# Patient Record
Sex: MALE | Race: WHITE | NOT HISPANIC OR LATINO
[De-identification: names, ages, dates, MRNs, and addresses within clinical notes are randomized per-mention and may not be internally consistent; named-entity substitution may affect disease eponyms.]

---

## 2020-06-15 PROBLEM — Z00.00 ENCOUNTER FOR PREVENTIVE HEALTH EXAMINATION: Status: ACTIVE | Noted: 2020-06-15

## 2020-07-07 ENCOUNTER — APPOINTMENT (OUTPATIENT)
Dept: OTOLARYNGOLOGY | Facility: CLINIC | Age: 70
End: 2020-07-07
Payer: MEDICARE

## 2020-07-07 VITALS
HEIGHT: 74 IN | WEIGHT: 258 LBS | DIASTOLIC BLOOD PRESSURE: 80 MMHG | BODY MASS INDEX: 33.11 KG/M2 | HEART RATE: 91 BPM | SYSTOLIC BLOOD PRESSURE: 123 MMHG | RESPIRATION RATE: 16 BRPM

## 2020-07-07 PROCEDURE — 99204 OFFICE O/P NEW MOD 45 MIN: CPT

## 2020-07-07 RX ORDER — OMEPRAZOLE 40 MG/1
CAPSULE, DELAYED RELEASE ORAL
Refills: 0 | Status: ACTIVE | COMMUNITY

## 2020-07-07 RX ORDER — ALLOPURINOL 300 MG/1
300 TABLET ORAL
Refills: 0 | Status: ACTIVE | COMMUNITY

## 2020-07-07 RX ORDER — ROSUVASTATIN CALCIUM 20 MG/1
20 TABLET, FILM COATED ORAL
Refills: 0 | Status: ACTIVE | COMMUNITY

## 2020-07-07 RX ORDER — TRAZODONE HYDROCHLORIDE 300 MG/1
TABLET ORAL
Refills: 0 | Status: ACTIVE | COMMUNITY

## 2020-07-07 RX ORDER — GABAPENTIN 600 MG/1
600 TABLET, COATED ORAL
Refills: 0 | Status: ACTIVE | COMMUNITY

## 2020-07-07 NOTE — CONSULT LETTER
[Consult Letter:] : I had the pleasure of evaluating your patient, [unfilled]. [Dear  ___] : Dear  [unfilled], [Consult Closing:] : Thank you very much for allowing me to participate in the care of this patient.  If you have any questions, please do not hesitate to contact me. [Please see my note below.] : Please see my note below. [Sincerely,] : Sincerely, [FreeTextEntry2] : Juan Blue MD ([place], [place]) [FreeTextEntry3] : Diego Gleason MD

## 2020-07-07 NOTE — PHYSICAL EXAM
[FreeTextEntry1] : no palp neck masses or nodes [Midline] : trachea located in midline position [Normal] : orientation to person, place, and time: normal

## 2020-07-07 NOTE — HISTORY OF PRESENT ILLNESS
[None] : The patient is currently asymptomatic. [de-identified] : Mr. Vaz is a 69 yo male referred by Dr. Blue,(otolaryngologist)  for thyroid nodules. \par 5/4/2020 cervical MRI showing enlarged thyroid\par 5/19/2020 ultrasound showing bilateral thyroid nodules largest measuring 2.4 cm in the right thyroid gland \par Denies dysphagia,dyspnea. Some hoarseness, changes in voice  and some clearing of throat. \par Pt. recently had a knee replacement.\par Denies familial thyroid cancer or thyroid disease. \par \par no swaloow or resp issues.  [Painful Swallowing] : no painful swallowing [Neck Mass] : no neck mass

## 2020-07-23 ENCOUNTER — APPOINTMENT (OUTPATIENT)
Dept: ULTRASOUND IMAGING | Facility: IMAGING CENTER | Age: 70
End: 2020-07-23
Payer: MEDICARE

## 2020-07-23 ENCOUNTER — RESULT REVIEW (OUTPATIENT)
Age: 70
End: 2020-07-23

## 2020-07-23 ENCOUNTER — OUTPATIENT (OUTPATIENT)
Dept: OUTPATIENT SERVICES | Facility: HOSPITAL | Age: 70
LOS: 1 days | End: 2020-07-23
Payer: MEDICARE

## 2020-07-23 DIAGNOSIS — E04.1 NONTOXIC SINGLE THYROID NODULE: ICD-10-CM

## 2020-07-23 PROCEDURE — 88173 CYTOPATH EVAL FNA REPORT: CPT | Mod: 26

## 2020-07-23 PROCEDURE — 10006 FNA BX W/US GDN EA ADDL: CPT

## 2020-07-23 PROCEDURE — 88172 CYTP DX EVAL FNA 1ST EA SITE: CPT

## 2020-07-23 PROCEDURE — 10005 FNA BX W/US GDN 1ST LES: CPT

## 2020-07-23 PROCEDURE — 88173 CYTOPATH EVAL FNA REPORT: CPT

## 2021-01-01 ENCOUNTER — APPOINTMENT (OUTPATIENT)
Dept: OTOLARYNGOLOGY | Facility: CLINIC | Age: 71
End: 2021-01-01
Payer: MEDICARE

## 2021-01-01 ENCOUNTER — NON-APPOINTMENT (OUTPATIENT)
Age: 71
End: 2021-01-01

## 2021-01-01 VITALS
SYSTOLIC BLOOD PRESSURE: 144 MMHG | HEART RATE: 66 BPM | WEIGHT: 259 LBS | BODY MASS INDEX: 33.24 KG/M2 | HEIGHT: 74 IN | DIASTOLIC BLOOD PRESSURE: 84 MMHG

## 2021-01-01 DIAGNOSIS — Z87.891 PERSONAL HISTORY OF NICOTINE DEPENDENCE: ICD-10-CM

## 2021-01-01 DIAGNOSIS — E04.2 NONTOXIC MULTINODULAR GOITER: ICD-10-CM

## 2021-01-01 DIAGNOSIS — Z82.2 FAMILY HISTORY OF DEAFNESS AND HEARING LOSS: ICD-10-CM

## 2021-01-01 DIAGNOSIS — Z82.49 FAMILY HISTORY OF ISCHEMIC HEART DISEASE AND OTHER DISEASES OF THE CIRCULATORY SYSTEM: ICD-10-CM

## 2021-01-01 DIAGNOSIS — E04.1 NONTOXIC SINGLE THYROID NODULE: ICD-10-CM

## 2021-01-01 PROCEDURE — 99213 OFFICE O/P EST LOW 20 MIN: CPT

## 2021-01-01 RX ORDER — MELOXICAM 15 MG/1
TABLET ORAL
Refills: 0 | Status: ACTIVE | COMMUNITY

## 2021-01-01 RX ORDER — OLMESARTAN MEDOXOMIL-HYDROCHLOROTHIAZIDE 12.5; 4 MG/1; MG/1
40-12.5 TABLET, FILM COATED ORAL
Refills: 0 | Status: DISCONTINUED | COMMUNITY
End: 2021-01-01

## 2021-11-30 PROBLEM — Z82.2 FAMILY HISTORY OF DEAFNESS OR HEARING LOSS: Status: ACTIVE | Noted: 2021-01-01

## 2021-11-30 PROBLEM — Z82.49 FAMILY HISTORY OF MYOCARDIAL INFARCTION: Status: ACTIVE | Noted: 2021-01-01

## 2021-11-30 PROBLEM — E04.1 THYROID NODULE: Status: ACTIVE | Noted: 2020-07-07

## 2021-11-30 PROBLEM — Z87.891 HISTORY OF CIGAR SMOKING: Status: ACTIVE | Noted: 2021-01-01

## 2021-11-30 NOTE — HISTORY OF PRESENT ILLNESS
[None] : The patient is currently asymptomatic. [de-identified] : 71 year male referred by Dr. Blue,(otolaryngologist)  for thyroid nodules. \par 5/4/2020 cervical MRI showing enlarged thyroid\par 5/19/2020 ultrasound showing bilateral thyroid nodules largest measuring 2.4 cm in the right thyroid gland \par Denies Patient denies dysphagia, odynophagia, dyspnea or otalgia. Denies recent fever or infections. Takes Omeprazole daily. Occasionally smokes a cigar. Some hoarseness and some clearing of throat, stable since last clinic visit.\par Denies familial thyroid cancer or thyroid disease. \par COVID vaccination (Phizer) 2nd dose completed 04/07/2021 [Neck Mass] : no neck mass [Painful Swallowing] : no painful swallowing

## 2021-11-30 NOTE — PHYSICAL EXAM
[FreeTextEntry1] : no palp neck masses or nodes [Midline] : trachea located in midline position [Normal] : no rashes

## 2021-11-30 NOTE — CONSULT LETTER
[Dear  ___] : Dear  [unfilled], [Consult Letter:] : I had the pleasure of evaluating your patient, [unfilled]. [Please see my note below.] : Please see my note below. [Consult Closing:] : Thank you very much for allowing me to participate in the care of this patient.  If you have any questions, please do not hesitate to contact me. [Sincerely,] : Sincerely, [FreeTextEntry2] : Juan Blue MD ([place], [place]) [FreeTextEntry3] : Diego Gleason MD, FACS\par \par [facility] [place]\par Associate Chair\par Department of Otolaryngology\par Professor\par Otolaryngology & Molecular Medicine\par [employer] [facility] [employer] [facility]

## 2021-12-28 PROBLEM — E04.2 MULTINODULAR THYROID: Status: ACTIVE | Noted: 2021-01-01

## 2022-01-01 ENCOUNTER — INPATIENT (INPATIENT)
Facility: HOSPITAL | Age: 72
LOS: 8 days | Discharge: HOSPICE | DRG: 314 | End: 2022-09-04
Attending: STUDENT IN AN ORGANIZED HEALTH CARE EDUCATION/TRAINING PROGRAM | Admitting: INTERNAL MEDICINE
Payer: MEDICARE

## 2022-01-01 ENCOUNTER — APPOINTMENT (OUTPATIENT)
Dept: PULMONOLOGY | Facility: CLINIC | Age: 72
End: 2022-01-01

## 2022-01-01 ENCOUNTER — TRANSCRIPTION ENCOUNTER (OUTPATIENT)
Age: 72
End: 2022-01-01

## 2022-01-01 VITALS
RESPIRATION RATE: 18 BRPM | HEART RATE: 88 BPM | SYSTOLIC BLOOD PRESSURE: 103 MMHG | TEMPERATURE: 98 F | OXYGEN SATURATION: 99 % | DIASTOLIC BLOOD PRESSURE: 71 MMHG

## 2022-01-01 VITALS
WEIGHT: 240.08 LBS | OXYGEN SATURATION: 95 % | HEIGHT: 72 IN | SYSTOLIC BLOOD PRESSURE: 96 MMHG | HEART RATE: 108 BPM | TEMPERATURE: 98 F | DIASTOLIC BLOOD PRESSURE: 67 MMHG | RESPIRATION RATE: 18 BRPM

## 2022-01-01 DIAGNOSIS — R18.8 OTHER ASCITES: ICD-10-CM

## 2022-01-01 DIAGNOSIS — D69.6 THROMBOCYTOPENIA, UNSPECIFIED: ICD-10-CM

## 2022-01-01 DIAGNOSIS — K59.00 CONSTIPATION, UNSPECIFIED: ICD-10-CM

## 2022-01-01 DIAGNOSIS — I95.9 HYPOTENSION, UNSPECIFIED: ICD-10-CM

## 2022-01-01 DIAGNOSIS — R06.02 SHORTNESS OF BREATH: ICD-10-CM

## 2022-01-01 DIAGNOSIS — C23 MALIGNANT NEOPLASM OF GALLBLADDER: ICD-10-CM

## 2022-01-01 DIAGNOSIS — Z51.5 ENCOUNTER FOR PALLIATIVE CARE: ICD-10-CM

## 2022-01-01 DIAGNOSIS — I82.409 ACUTE EMBOLISM AND THROMBOSIS OF UNSPECIFIED DEEP VEINS OF UNSPECIFIED LOWER EXTREMITY: ICD-10-CM

## 2022-01-01 DIAGNOSIS — E87.1 HYPO-OSMOLALITY AND HYPONATREMIA: ICD-10-CM

## 2022-01-01 DIAGNOSIS — R53.1 WEAKNESS: ICD-10-CM

## 2022-01-01 DIAGNOSIS — J90 PLEURAL EFFUSION, NOT ELSEWHERE CLASSIFIED: ICD-10-CM

## 2022-01-01 DIAGNOSIS — I26.99 OTHER PULMONARY EMBOLISM WITHOUT ACUTE COR PULMONALE: ICD-10-CM

## 2022-01-01 DIAGNOSIS — R09.89 OTHER SPECIFIED SYMPTOMS AND SIGNS INVOLVING THE CIRCULATORY AND RESPIRATORY SYSTEMS: ICD-10-CM

## 2022-01-01 DIAGNOSIS — N17.9 ACUTE KIDNEY FAILURE, UNSPECIFIED: ICD-10-CM

## 2022-01-01 DIAGNOSIS — G89.3 NEOPLASM RELATED PAIN (ACUTE) (CHRONIC): ICD-10-CM

## 2022-01-01 LAB
A1C WITH ESTIMATED AVERAGE GLUCOSE RESULT: 6.9 % — HIGH (ref 4–5.6)
ALBUMIN FLD-MCNC: 1.9 G/DL — SIGNIFICANT CHANGE UP
ALBUMIN FLD-MCNC: 2 G/DL — SIGNIFICANT CHANGE UP
ALBUMIN SERPL ELPH-MCNC: 2.5 G/DL — LOW (ref 3.3–5)
ALBUMIN SERPL ELPH-MCNC: 2.7 G/DL — LOW (ref 3.3–5)
ALBUMIN SERPL ELPH-MCNC: 3.1 G/DL — LOW (ref 3.3–5)
ALBUMIN SERPL ELPH-MCNC: 3.1 G/DL — LOW (ref 3.3–5)
ALBUMIN SERPL ELPH-MCNC: 3.2 G/DL — LOW (ref 3.3–5)
ALBUMIN SERPL ELPH-MCNC: 3.5 G/DL — SIGNIFICANT CHANGE UP (ref 3.3–5)
ALP SERPL-CCNC: 280 U/L — HIGH (ref 40–120)
ALP SERPL-CCNC: 343 U/L — HIGH (ref 40–120)
ALP SERPL-CCNC: 406 U/L — HIGH (ref 40–120)
ALP SERPL-CCNC: 501 U/L — HIGH (ref 40–120)
ALP SERPL-CCNC: 535 U/L — HIGH (ref 40–120)
ALP SERPL-CCNC: 537 U/L — HIGH (ref 40–120)
ALT FLD-CCNC: 25 U/L — SIGNIFICANT CHANGE UP (ref 10–45)
ALT FLD-CCNC: 35 U/L — SIGNIFICANT CHANGE UP (ref 10–45)
ALT FLD-CCNC: 39 U/L — SIGNIFICANT CHANGE UP (ref 10–45)
ALT FLD-CCNC: 46 U/L — HIGH (ref 10–45)
ALT FLD-CCNC: 46 U/L — HIGH (ref 10–45)
ALT FLD-CCNC: 63 U/L — HIGH (ref 10–45)
ANION GAP SERPL CALC-SCNC: 10 MMOL/L — SIGNIFICANT CHANGE UP (ref 5–17)
ANION GAP SERPL CALC-SCNC: 11 MMOL/L — SIGNIFICANT CHANGE UP (ref 5–17)
ANION GAP SERPL CALC-SCNC: 13 MMOL/L — SIGNIFICANT CHANGE UP (ref 5–17)
ANION GAP SERPL CALC-SCNC: 13 MMOL/L — SIGNIFICANT CHANGE UP (ref 5–17)
ANION GAP SERPL CALC-SCNC: 14 MMOL/L — SIGNIFICANT CHANGE UP (ref 5–17)
ANION GAP SERPL CALC-SCNC: 15 MMOL/L — SIGNIFICANT CHANGE UP (ref 5–17)
ANION GAP SERPL CALC-SCNC: 16 MMOL/L — SIGNIFICANT CHANGE UP (ref 5–17)
ANION GAP SERPL CALC-SCNC: 17 MMOL/L — SIGNIFICANT CHANGE UP (ref 5–17)
APPEARANCE UR: ABNORMAL
APPEARANCE UR: ABNORMAL
APTT BLD: 23.5 SEC — LOW (ref 27.5–35.5)
APTT BLD: 31.1 SEC — SIGNIFICANT CHANGE UP (ref 27.5–35.5)
AST SERPL-CCNC: 100 U/L — HIGH (ref 10–40)
AST SERPL-CCNC: 46 U/L — HIGH (ref 10–40)
AST SERPL-CCNC: 48 U/L — HIGH (ref 10–40)
AST SERPL-CCNC: 63 U/L — HIGH (ref 10–40)
AST SERPL-CCNC: 67 U/L — HIGH (ref 10–40)
AST SERPL-CCNC: 67 U/L — HIGH (ref 10–40)
B PERT IGG+IGM PNL SER: ABNORMAL
B PERT IGG+IGM PNL SER: ABNORMAL
BACTERIA # UR AUTO: NEGATIVE — SIGNIFICANT CHANGE UP
BACTERIA # UR AUTO: NEGATIVE — SIGNIFICANT CHANGE UP
BASE EXCESS BLDV CALC-SCNC: 4.9 MMOL/L — HIGH (ref -2–3)
BASE EXCESS BLDV CALC-SCNC: 6.6 MMOL/L — HIGH (ref -2–3)
BASOPHILS # BLD AUTO: 0 K/UL — SIGNIFICANT CHANGE UP (ref 0–0.2)
BASOPHILS NFR BLD AUTO: 0 % — SIGNIFICANT CHANGE UP (ref 0–2)
BILIRUB SERPL-MCNC: 0.5 MG/DL — SIGNIFICANT CHANGE UP (ref 0.2–1.2)
BILIRUB SERPL-MCNC: 0.5 MG/DL — SIGNIFICANT CHANGE UP (ref 0.2–1.2)
BILIRUB SERPL-MCNC: 0.6 MG/DL — SIGNIFICANT CHANGE UP (ref 0.2–1.2)
BILIRUB SERPL-MCNC: 0.7 MG/DL — SIGNIFICANT CHANGE UP (ref 0.2–1.2)
BILIRUB SERPL-MCNC: 0.9 MG/DL — SIGNIFICANT CHANGE UP (ref 0.2–1.2)
BILIRUB SERPL-MCNC: 1 MG/DL — SIGNIFICANT CHANGE UP (ref 0.2–1.2)
BILIRUB UR-MCNC: NEGATIVE — SIGNIFICANT CHANGE UP
BILIRUB UR-MCNC: NEGATIVE — SIGNIFICANT CHANGE UP
BLD GP AB SCN SERPL QL: NEGATIVE — SIGNIFICANT CHANGE UP
BLD GP AB SCN SERPL QL: NEGATIVE — SIGNIFICANT CHANGE UP
BUN SERPL-MCNC: 105 MG/DL — HIGH (ref 7–23)
BUN SERPL-MCNC: 47 MG/DL — HIGH (ref 7–23)
BUN SERPL-MCNC: 52 MG/DL — HIGH (ref 7–23)
BUN SERPL-MCNC: 53 MG/DL — HIGH (ref 7–23)
BUN SERPL-MCNC: 53 MG/DL — HIGH (ref 7–23)
BUN SERPL-MCNC: 56 MG/DL — HIGH (ref 7–23)
BUN SERPL-MCNC: 61 MG/DL — HIGH (ref 7–23)
BUN SERPL-MCNC: 63 MG/DL — HIGH (ref 7–23)
BUN SERPL-MCNC: 91 MG/DL — HIGH (ref 7–23)
BUN SERPL-MCNC: 93 MG/DL — HIGH (ref 7–23)
CA-I SERPL-SCNC: 1.1 MMOL/L — LOW (ref 1.15–1.33)
CA-I SERPL-SCNC: 1.11 MMOL/L — LOW (ref 1.15–1.33)
CALCIUM SERPL-MCNC: 8.2 MG/DL — LOW (ref 8.4–10.5)
CALCIUM SERPL-MCNC: 8.5 MG/DL — SIGNIFICANT CHANGE UP (ref 8.4–10.5)
CALCIUM SERPL-MCNC: 8.8 MG/DL — SIGNIFICANT CHANGE UP (ref 8.4–10.5)
CALCIUM SERPL-MCNC: 8.9 MG/DL — SIGNIFICANT CHANGE UP (ref 8.4–10.5)
CALCIUM SERPL-MCNC: 9.3 MG/DL — SIGNIFICANT CHANGE UP (ref 8.4–10.5)
CALCIUM UR-MCNC: 1 MG/DL — SIGNIFICANT CHANGE UP
CHLORIDE BLDV-SCNC: 86 MMOL/L — LOW (ref 96–108)
CHLORIDE BLDV-SCNC: 89 MMOL/L — LOW (ref 96–108)
CHLORIDE SERPL-SCNC: 86 MMOL/L — LOW (ref 96–108)
CHLORIDE SERPL-SCNC: 88 MMOL/L — LOW (ref 96–108)
CHLORIDE SERPL-SCNC: 88 MMOL/L — LOW (ref 96–108)
CHLORIDE SERPL-SCNC: 90 MMOL/L — LOW (ref 96–108)
CHLORIDE SERPL-SCNC: 90 MMOL/L — LOW (ref 96–108)
CHLORIDE SERPL-SCNC: 92 MMOL/L — LOW (ref 96–108)
CHLORIDE SERPL-SCNC: 93 MMOL/L — LOW (ref 96–108)
CHLORIDE SERPL-SCNC: 95 MMOL/L — LOW (ref 96–108)
CHLORIDE SERPL-SCNC: 96 MMOL/L — SIGNIFICANT CHANGE UP (ref 96–108)
CHLORIDE SERPL-SCNC: 99 MMOL/L — SIGNIFICANT CHANGE UP (ref 96–108)
CHLORIDE UR-SCNC: <20 MMOL/L — SIGNIFICANT CHANGE UP
CHOLEST SERPL-MCNC: 98 MG/DL — SIGNIFICANT CHANGE UP
CO2 BLDV-SCNC: 31 MMOL/L — HIGH (ref 22–26)
CO2 BLDV-SCNC: 33 MMOL/L — HIGH (ref 22–26)
CO2 SERPL-SCNC: 23 MMOL/L — SIGNIFICANT CHANGE UP (ref 22–31)
CO2 SERPL-SCNC: 24 MMOL/L — SIGNIFICANT CHANGE UP (ref 22–31)
CO2 SERPL-SCNC: 25 MMOL/L — SIGNIFICANT CHANGE UP (ref 22–31)
CO2 SERPL-SCNC: 26 MMOL/L — SIGNIFICANT CHANGE UP (ref 22–31)
CO2 SERPL-SCNC: 26 MMOL/L — SIGNIFICANT CHANGE UP (ref 22–31)
CO2 SERPL-SCNC: 27 MMOL/L — SIGNIFICANT CHANGE UP (ref 22–31)
CO2 SERPL-SCNC: 30 MMOL/L — SIGNIFICANT CHANGE UP (ref 22–31)
CO2 SERPL-SCNC: 30 MMOL/L — SIGNIFICANT CHANGE UP (ref 22–31)
COLOR FLD: YELLOW — SIGNIFICANT CHANGE UP
COLOR FLD: YELLOW — SIGNIFICANT CHANGE UP
COLOR SPEC: YELLOW — SIGNIFICANT CHANGE UP
COLOR SPEC: YELLOW — SIGNIFICANT CHANGE UP
CORTIS AM PEAK SERPL-MCNC: 33.2 UG/DL — HIGH (ref 6–18.4)
CREAT ?TM UR-MCNC: 120 MG/DL — SIGNIFICANT CHANGE UP
CREAT ?TM UR-MCNC: 88 MG/DL — SIGNIFICANT CHANGE UP
CREAT SERPL-MCNC: 0.79 MG/DL — SIGNIFICANT CHANGE UP (ref 0.5–1.3)
CREAT SERPL-MCNC: 0.94 MG/DL — SIGNIFICANT CHANGE UP (ref 0.5–1.3)
CREAT SERPL-MCNC: 0.95 MG/DL — SIGNIFICANT CHANGE UP (ref 0.5–1.3)
CREAT SERPL-MCNC: 1.02 MG/DL — SIGNIFICANT CHANGE UP (ref 0.5–1.3)
CREAT SERPL-MCNC: 1.19 MG/DL — SIGNIFICANT CHANGE UP (ref 0.5–1.3)
CREAT SERPL-MCNC: 1.25 MG/DL — SIGNIFICANT CHANGE UP (ref 0.5–1.3)
CREAT SERPL-MCNC: 1.69 MG/DL — HIGH (ref 0.5–1.3)
CREAT SERPL-MCNC: 1.7 MG/DL — HIGH (ref 0.5–1.3)
CREAT SERPL-MCNC: 2.33 MG/DL — HIGH (ref 0.5–1.3)
CREAT SERPL-MCNC: 2.37 MG/DL — HIGH (ref 0.5–1.3)
CULTURE RESULTS: SIGNIFICANT CHANGE UP
DIFF PNL FLD: ABNORMAL
DIFF PNL FLD: ABNORMAL
EGFR: 29 ML/MIN/1.73M2 — LOW
EGFR: 29 ML/MIN/1.73M2 — LOW
EGFR: 43 ML/MIN/1.73M2 — LOW
EGFR: 43 ML/MIN/1.73M2 — LOW
EGFR: 62 ML/MIN/1.73M2 — SIGNIFICANT CHANGE UP
EGFR: 66 ML/MIN/1.73M2 — SIGNIFICANT CHANGE UP
EGFR: 79 ML/MIN/1.73M2 — SIGNIFICANT CHANGE UP
EGFR: 86 ML/MIN/1.73M2 — SIGNIFICANT CHANGE UP
EGFR: 87 ML/MIN/1.73M2 — SIGNIFICANT CHANGE UP
EGFR: 96 ML/MIN/1.73M2 — SIGNIFICANT CHANGE UP
EOSINOPHIL # BLD AUTO: 0 K/UL — SIGNIFICANT CHANGE UP (ref 0–0.5)
EOSINOPHIL NFR BLD AUTO: 0 % — SIGNIFICANT CHANGE UP (ref 0–6)
EPI CELLS # UR: 14 /HPF — HIGH
EPI CELLS # UR: 35 /HPF — HIGH
ESTIMATED AVERAGE GLUCOSE: 151 MG/DL — HIGH (ref 68–114)
FLUID INTAKE SUBSTANCE CLASS: SIGNIFICANT CHANGE UP
FLUID INTAKE SUBSTANCE CLASS: SIGNIFICANT CHANGE UP
GAS PNL BLDV: 120 MMOL/L — CRITICAL LOW (ref 136–145)
GAS PNL BLDV: 122 MMOL/L — LOW (ref 136–145)
GAS PNL BLDV: SIGNIFICANT CHANGE UP
GLUCOSE BLDV-MCNC: 121 MG/DL — HIGH (ref 70–99)
GLUCOSE BLDV-MCNC: 138 MG/DL — HIGH (ref 70–99)
GLUCOSE FLD-MCNC: 86 MG/DL — SIGNIFICANT CHANGE UP
GLUCOSE FLD-MCNC: 99 MG/DL — SIGNIFICANT CHANGE UP
GLUCOSE SERPL-MCNC: 104 MG/DL — HIGH (ref 70–99)
GLUCOSE SERPL-MCNC: 106 MG/DL — HIGH (ref 70–99)
GLUCOSE SERPL-MCNC: 115 MG/DL — HIGH (ref 70–99)
GLUCOSE SERPL-MCNC: 116 MG/DL — HIGH (ref 70–99)
GLUCOSE SERPL-MCNC: 120 MG/DL — HIGH (ref 70–99)
GLUCOSE SERPL-MCNC: 131 MG/DL — HIGH (ref 70–99)
GLUCOSE SERPL-MCNC: 143 MG/DL — HIGH (ref 70–99)
GLUCOSE SERPL-MCNC: 144 MG/DL — HIGH (ref 70–99)
GLUCOSE SERPL-MCNC: 145 MG/DL — HIGH (ref 70–99)
GLUCOSE SERPL-MCNC: 88 MG/DL — SIGNIFICANT CHANGE UP (ref 70–99)
GLUCOSE UR QL: NEGATIVE — SIGNIFICANT CHANGE UP
GLUCOSE UR QL: NEGATIVE — SIGNIFICANT CHANGE UP
GRAM STN FLD: SIGNIFICANT CHANGE UP
GRAM STN FLD: SIGNIFICANT CHANGE UP
HCO3 BLDV-SCNC: 30 MMOL/L — HIGH (ref 22–29)
HCO3 BLDV-SCNC: 31 MMOL/L — HIGH (ref 22–29)
HCT VFR BLD CALC: 37.6 % — LOW (ref 39–50)
HCT VFR BLD CALC: 37.6 % — LOW (ref 39–50)
HCT VFR BLD CALC: 38.6 % — LOW (ref 39–50)
HCT VFR BLD CALC: 39.3 % — SIGNIFICANT CHANGE UP (ref 39–50)
HCT VFR BLD CALC: 42.1 % — SIGNIFICANT CHANGE UP (ref 39–50)
HCT VFR BLD CALC: 42.3 % — SIGNIFICANT CHANGE UP (ref 39–50)
HCT VFR BLD CALC: 43.3 % — SIGNIFICANT CHANGE UP (ref 39–50)
HCT VFR BLD CALC: 46.2 % — SIGNIFICANT CHANGE UP (ref 39–50)
HCT VFR BLDA CALC: 43 % — SIGNIFICANT CHANGE UP (ref 39–51)
HCT VFR BLDA CALC: 50 % — SIGNIFICANT CHANGE UP (ref 39–51)
HCV AB S/CO SERPL IA: 0.06 S/CO — SIGNIFICANT CHANGE UP (ref 0–0.99)
HCV AB SERPL-IMP: SIGNIFICANT CHANGE UP
HDLC SERPL-MCNC: 37 MG/DL — LOW
HGB BLD CALC-MCNC: 14.2 G/DL — SIGNIFICANT CHANGE UP (ref 12.6–17.4)
HGB BLD CALC-MCNC: 16.7 G/DL — SIGNIFICANT CHANGE UP (ref 12.6–17.4)
HGB BLD-MCNC: 11.8 G/DL — LOW (ref 13–17)
HGB BLD-MCNC: 12.2 G/DL — LOW (ref 13–17)
HGB BLD-MCNC: 12.6 G/DL — LOW (ref 13–17)
HGB BLD-MCNC: 12.6 G/DL — LOW (ref 13–17)
HGB BLD-MCNC: 13.1 G/DL — SIGNIFICANT CHANGE UP (ref 13–17)
HGB BLD-MCNC: 13.4 G/DL — SIGNIFICANT CHANGE UP (ref 13–17)
HGB BLD-MCNC: 14.1 G/DL — SIGNIFICANT CHANGE UP (ref 13–17)
HGB BLD-MCNC: 14.8 G/DL — SIGNIFICANT CHANGE UP (ref 13–17)
HYALINE CASTS # UR AUTO: 16 /LPF — HIGH (ref 0–2)
HYALINE CASTS # UR AUTO: 3 /LPF — SIGNIFICANT CHANGE UP (ref 0–7)
INR BLD: 1.2 RATIO — HIGH (ref 0.88–1.16)
INR BLD: 1.31 RATIO — HIGH (ref 0.88–1.16)
KETONES UR-MCNC: NEGATIVE — SIGNIFICANT CHANGE UP
KETONES UR-MCNC: NEGATIVE — SIGNIFICANT CHANGE UP
LACTATE BLDV-MCNC: 2.1 MMOL/L — HIGH (ref 0.5–2)
LACTATE BLDV-MCNC: 2.9 MMOL/L — HIGH (ref 0.5–2)
LDH SERPL L TO P-CCNC: 596 U/L — SIGNIFICANT CHANGE UP
LDH SERPL L TO P-CCNC: 756 U/L — SIGNIFICANT CHANGE UP
LEUKOCYTE ESTERASE UR-ACNC: NEGATIVE — SIGNIFICANT CHANGE UP
LEUKOCYTE ESTERASE UR-ACNC: NEGATIVE — SIGNIFICANT CHANGE UP
LIPID PNL WITH DIRECT LDL SERPL: 34 MG/DL — SIGNIFICANT CHANGE UP
LYMPHOCYTES # BLD AUTO: 0 % — LOW (ref 13–44)
LYMPHOCYTES # BLD AUTO: 0 K/UL — LOW (ref 1–3.3)
LYMPHOCYTES # FLD: 45 % — SIGNIFICANT CHANGE UP
LYMPHOCYTES # FLD: 68 % — SIGNIFICANT CHANGE UP
MAGNESIUM SERPL-MCNC: 2.2 MG/DL — SIGNIFICANT CHANGE UP (ref 1.6–2.6)
MAGNESIUM UR-MCNC: 1.2 MG/DL — SIGNIFICANT CHANGE UP
MANUAL SMEAR VERIFICATION: SIGNIFICANT CHANGE UP
MCHC RBC-ENTMCNC: 30.9 PG — SIGNIFICANT CHANGE UP (ref 27–34)
MCHC RBC-ENTMCNC: 31 GM/DL — LOW (ref 32–36)
MCHC RBC-ENTMCNC: 31 PG — SIGNIFICANT CHANGE UP (ref 27–34)
MCHC RBC-ENTMCNC: 31 PG — SIGNIFICANT CHANGE UP (ref 27–34)
MCHC RBC-ENTMCNC: 31.1 PG — SIGNIFICANT CHANGE UP (ref 27–34)
MCHC RBC-ENTMCNC: 31.3 PG — SIGNIFICANT CHANGE UP (ref 27–34)
MCHC RBC-ENTMCNC: 31.3 PG — SIGNIFICANT CHANGE UP (ref 27–34)
MCHC RBC-ENTMCNC: 31.4 GM/DL — LOW (ref 32–36)
MCHC RBC-ENTMCNC: 31.6 PG — SIGNIFICANT CHANGE UP (ref 27–34)
MCHC RBC-ENTMCNC: 31.7 PG — SIGNIFICANT CHANGE UP (ref 27–34)
MCHC RBC-ENTMCNC: 31.8 GM/DL — LOW (ref 32–36)
MCHC RBC-ENTMCNC: 32 GM/DL — SIGNIFICANT CHANGE UP (ref 32–36)
MCHC RBC-ENTMCNC: 32.1 GM/DL — SIGNIFICANT CHANGE UP (ref 32–36)
MCHC RBC-ENTMCNC: 32.4 GM/DL — SIGNIFICANT CHANGE UP (ref 32–36)
MCHC RBC-ENTMCNC: 32.6 GM/DL — SIGNIFICANT CHANGE UP (ref 32–36)
MCHC RBC-ENTMCNC: 32.6 GM/DL — SIGNIFICANT CHANGE UP (ref 32–36)
MCV RBC AUTO: 100 FL — SIGNIFICANT CHANGE UP (ref 80–100)
MCV RBC AUTO: 96.2 FL — SIGNIFICANT CHANGE UP (ref 80–100)
MCV RBC AUTO: 96.5 FL — SIGNIFICANT CHANGE UP (ref 80–100)
MCV RBC AUTO: 97 FL — SIGNIFICANT CHANGE UP (ref 80–100)
MCV RBC AUTO: 97.4 FL — SIGNIFICANT CHANGE UP (ref 80–100)
MCV RBC AUTO: 97.5 FL — SIGNIFICANT CHANGE UP (ref 80–100)
MCV RBC AUTO: 97.8 FL — SIGNIFICANT CHANGE UP (ref 80–100)
MCV RBC AUTO: 98.9 FL — SIGNIFICANT CHANGE UP (ref 80–100)
MESOTHL CELL # FLD: 1 % — SIGNIFICANT CHANGE UP
MONOCYTES # BLD AUTO: 0 K/UL — SIGNIFICANT CHANGE UP (ref 0–0.9)
MONOCYTES NFR BLD AUTO: 0 % — LOW (ref 2–14)
MONOS+MACROS # FLD: 11 % — SIGNIFICANT CHANGE UP
MONOS+MACROS # FLD: 17 % — SIGNIFICANT CHANGE UP
MRSA PCR RESULT.: SIGNIFICANT CHANGE UP
NEUTROPHILS # BLD AUTO: 9.22 K/UL — HIGH (ref 1.8–7.4)
NEUTROPHILS NFR BLD AUTO: 99.1 % — HIGH (ref 43–77)
NEUTROPHILS-BODY FLUID: 15 % — SIGNIFICANT CHANGE UP
NEUTROPHILS-BODY FLUID: 43 % — SIGNIFICANT CHANGE UP
NEUTS HYPERSEG # BLD: PRESENT — SIGNIFICANT CHANGE UP
NITRITE UR-MCNC: NEGATIVE — SIGNIFICANT CHANGE UP
NITRITE UR-MCNC: NEGATIVE — SIGNIFICANT CHANGE UP
NON HDL CHOLESTEROL: 61 MG/DL — SIGNIFICANT CHANGE UP
NRBC # BLD: 3 /100 — HIGH (ref 0–0)
NRBC # BLD: 5 /100 WBCS — HIGH (ref 0–0)
NRBC # BLD: 5 /100 WBCS — HIGH (ref 0–0)
NRBC # BLD: 6 /100 WBCS — HIGH (ref 0–0)
NRBC # BLD: 7 /100 WBCS — HIGH (ref 0–0)
NRBC # BLD: 8 /100 WBCS — HIGH (ref 0–0)
NT-PROBNP SERPL-SCNC: 1894 PG/ML — HIGH (ref 0–300)
OSMOLALITY SERPL: 283 MOSMOL/KG — SIGNIFICANT CHANGE UP (ref 280–301)
OSMOLALITY UR: 423 MOS/KG — SIGNIFICANT CHANGE UP (ref 300–900)
OSMOLALITY UR: 452 MOS/KG — SIGNIFICANT CHANGE UP (ref 300–900)
PCO2 BLDV: 44 MMHG — SIGNIFICANT CHANGE UP (ref 42–55)
PCO2 BLDV: 44 MMHG — SIGNIFICANT CHANGE UP (ref 42–55)
PH BLDV: 7.44 — HIGH (ref 7.32–7.43)
PH BLDV: 7.46 — HIGH (ref 7.32–7.43)
PH UR: 5.5 — SIGNIFICANT CHANGE UP (ref 5–8)
PH UR: 6 — SIGNIFICANT CHANGE UP (ref 5–8)
PHOSPHATE 24H UR-MCNC: 49.9 MG/DL — SIGNIFICANT CHANGE UP
PHOSPHATE SERPL-MCNC: 2.3 MG/DL — LOW (ref 2.5–4.5)
PLAT MORPH BLD: NORMAL — SIGNIFICANT CHANGE UP
PLATELET # BLD AUTO: 25 K/UL — LOW (ref 150–400)
PLATELET # BLD AUTO: 30 K/UL — LOW (ref 150–400)
PLATELET # BLD AUTO: 33 K/UL — LOW (ref 150–400)
PLATELET # BLD AUTO: 34 K/UL — LOW (ref 150–400)
PLATELET # BLD AUTO: 35 K/UL — LOW (ref 150–400)
PLATELET # BLD AUTO: 41 K/UL — LOW (ref 150–400)
PLATELET # BLD AUTO: 50 K/UL — LOW (ref 150–400)
PLATELET # BLD AUTO: 67 K/UL — LOW (ref 150–400)
PO2 BLDV: 37 MMHG — SIGNIFICANT CHANGE UP (ref 25–45)
PO2 BLDV: 38 MMHG — SIGNIFICANT CHANGE UP (ref 25–45)
POTASSIUM BLDV-SCNC: 4.6 MMOL/L — SIGNIFICANT CHANGE UP (ref 3.5–5.1)
POTASSIUM BLDV-SCNC: 4.7 MMOL/L — SIGNIFICANT CHANGE UP (ref 3.5–5.1)
POTASSIUM SERPL-MCNC: 3.7 MMOL/L — SIGNIFICANT CHANGE UP (ref 3.5–5.3)
POTASSIUM SERPL-MCNC: 4 MMOL/L — SIGNIFICANT CHANGE UP (ref 3.5–5.3)
POTASSIUM SERPL-MCNC: 4 MMOL/L — SIGNIFICANT CHANGE UP (ref 3.5–5.3)
POTASSIUM SERPL-MCNC: 4.2 MMOL/L — SIGNIFICANT CHANGE UP (ref 3.5–5.3)
POTASSIUM SERPL-MCNC: 4.5 MMOL/L — SIGNIFICANT CHANGE UP (ref 3.5–5.3)
POTASSIUM SERPL-MCNC: 4.6 MMOL/L — SIGNIFICANT CHANGE UP (ref 3.5–5.3)
POTASSIUM SERPL-MCNC: 4.9 MMOL/L — SIGNIFICANT CHANGE UP (ref 3.5–5.3)
POTASSIUM SERPL-MCNC: 5.1 MMOL/L — SIGNIFICANT CHANGE UP (ref 3.5–5.3)
POTASSIUM SERPL-MCNC: 5.3 MMOL/L — SIGNIFICANT CHANGE UP (ref 3.5–5.3)
POTASSIUM SERPL-MCNC: 5.3 MMOL/L — SIGNIFICANT CHANGE UP (ref 3.5–5.3)
POTASSIUM SERPL-SCNC: 3.7 MMOL/L — SIGNIFICANT CHANGE UP (ref 3.5–5.3)
POTASSIUM SERPL-SCNC: 4 MMOL/L — SIGNIFICANT CHANGE UP (ref 3.5–5.3)
POTASSIUM SERPL-SCNC: 4 MMOL/L — SIGNIFICANT CHANGE UP (ref 3.5–5.3)
POTASSIUM SERPL-SCNC: 4.2 MMOL/L — SIGNIFICANT CHANGE UP (ref 3.5–5.3)
POTASSIUM SERPL-SCNC: 4.5 MMOL/L — SIGNIFICANT CHANGE UP (ref 3.5–5.3)
POTASSIUM SERPL-SCNC: 4.6 MMOL/L — SIGNIFICANT CHANGE UP (ref 3.5–5.3)
POTASSIUM SERPL-SCNC: 4.9 MMOL/L — SIGNIFICANT CHANGE UP (ref 3.5–5.3)
POTASSIUM SERPL-SCNC: 5.1 MMOL/L — SIGNIFICANT CHANGE UP (ref 3.5–5.3)
POTASSIUM SERPL-SCNC: 5.3 MMOL/L — SIGNIFICANT CHANGE UP (ref 3.5–5.3)
POTASSIUM SERPL-SCNC: 5.3 MMOL/L — SIGNIFICANT CHANGE UP (ref 3.5–5.3)
POTASSIUM UR-SCNC: 58 MMOL/L — SIGNIFICANT CHANGE UP
PROT ?TM UR-MCNC: 95 MG/DL — HIGH (ref 0–12)
PROT FLD-MCNC: 3.4 G/DL — SIGNIFICANT CHANGE UP
PROT FLD-MCNC: 3.6 G/DL — SIGNIFICANT CHANGE UP
PROT SERPL-MCNC: 5.1 G/DL — LOW (ref 6–8.3)
PROT SERPL-MCNC: 5.3 G/DL — LOW (ref 6–8.3)
PROT SERPL-MCNC: 5.6 G/DL — LOW (ref 6–8.3)
PROT SERPL-MCNC: 5.7 G/DL — LOW (ref 6–8.3)
PROT SERPL-MCNC: 5.7 G/DL — LOW (ref 6–8.3)
PROT SERPL-MCNC: 6 G/DL — SIGNIFICANT CHANGE UP (ref 6–8.3)
PROT UR-MCNC: ABNORMAL
PROT UR-MCNC: ABNORMAL
PROTHROM AB SERPL-ACNC: 14 SEC — HIGH (ref 10.5–13.4)
PROTHROM AB SERPL-ACNC: 15.2 SEC — HIGH (ref 10.5–13.4)
RAPID RVP RESULT: SIGNIFICANT CHANGE UP
RBC # BLD: 3.8 M/UL — LOW (ref 4.2–5.8)
RBC # BLD: 3.86 M/UL — LOW (ref 4.2–5.8)
RBC # BLD: 3.98 M/UL — LOW (ref 4.2–5.8)
RBC # BLD: 4.02 M/UL — LOW (ref 4.2–5.8)
RBC # BLD: 4.23 M/UL — SIGNIFICANT CHANGE UP (ref 4.2–5.8)
RBC # BLD: 4.32 M/UL — SIGNIFICANT CHANGE UP (ref 4.2–5.8)
RBC # BLD: 4.5 M/UL — SIGNIFICANT CHANGE UP (ref 4.2–5.8)
RBC # BLD: 4.79 M/UL — SIGNIFICANT CHANGE UP (ref 4.2–5.8)
RBC # FLD: 14.3 % — SIGNIFICANT CHANGE UP (ref 10.3–14.5)
RBC # FLD: 14.6 % — HIGH (ref 10.3–14.5)
RBC # FLD: 14.8 % — HIGH (ref 10.3–14.5)
RBC # FLD: 15.1 % — HIGH (ref 10.3–14.5)
RBC BLD AUTO: SIGNIFICANT CHANGE UP
RBC CASTS # UR COMP ASSIST: 3 /HPF — SIGNIFICANT CHANGE UP (ref 0–4)
RBC CASTS # UR COMP ASSIST: 5 /HPF — HIGH (ref 0–4)
RCV VOL RI: 4000 /UL — HIGH (ref 0–0)
RCV VOL RI: 5000 /UL — HIGH (ref 0–0)
RH IG SCN BLD-IMP: NEGATIVE — SIGNIFICANT CHANGE UP
S AUREUS DNA NOSE QL NAA+PROBE: SIGNIFICANT CHANGE UP
SAO2 % BLDV: 59.9 % — LOW (ref 67–88)
SAO2 % BLDV: 64.8 % — LOW (ref 67–88)
SARS-COV-2 RNA SPEC QL NAA+PROBE: SIGNIFICANT CHANGE UP
SARS-COV-2 RNA SPEC QL NAA+PROBE: SIGNIFICANT CHANGE UP
SODIUM SERPL-SCNC: 127 MMOL/L — LOW (ref 135–145)
SODIUM SERPL-SCNC: 128 MMOL/L — LOW (ref 135–145)
SODIUM SERPL-SCNC: 130 MMOL/L — LOW (ref 135–145)
SODIUM SERPL-SCNC: 133 MMOL/L — LOW (ref 135–145)
SODIUM SERPL-SCNC: 134 MMOL/L — LOW (ref 135–145)
SODIUM SERPL-SCNC: 135 MMOL/L — SIGNIFICANT CHANGE UP (ref 135–145)
SODIUM SERPL-SCNC: 138 MMOL/L — SIGNIFICANT CHANGE UP (ref 135–145)
SODIUM SERPL-SCNC: 140 MMOL/L — SIGNIFICANT CHANGE UP (ref 135–145)
SODIUM UR-SCNC: 8 MMOL/L — SIGNIFICANT CHANGE UP
SODIUM UR-SCNC: 8 MMOL/L — SIGNIFICANT CHANGE UP
SP GR SPEC: 1.04 — HIGH (ref 1.01–1.02)
SP GR SPEC: 1.05 — HIGH (ref 1.01–1.02)
SPECIMEN SOURCE: SIGNIFICANT CHANGE UP
TOTAL NUCLEATED CELL COUNT, BODY FLUID: 219 /UL — SIGNIFICANT CHANGE UP
TOTAL NUCLEATED CELL COUNT, BODY FLUID: 303 /UL — SIGNIFICANT CHANGE UP
TRIGL SERPL-MCNC: 134 MG/DL — SIGNIFICANT CHANGE UP
TROPONIN T, HIGH SENSITIVITY RESULT: 134 NG/L — HIGH (ref 0–51)
TROPONIN T, HIGH SENSITIVITY RESULT: 146 NG/L — HIGH (ref 0–51)
TROPONIN T, HIGH SENSITIVITY RESULT: 152 NG/L — HIGH (ref 0–51)
TROPONIN T, HIGH SENSITIVITY RESULT: 213 NG/L — HIGH (ref 0–51)
TROPONIN T, HIGH SENSITIVITY RESULT: 220 NG/L — HIGH (ref 0–51)
TROPONIN T, HIGH SENSITIVITY RESULT: 233 NG/L — HIGH (ref 0–51)
TSH SERPL-MCNC: 1.8 UIU/ML — SIGNIFICANT CHANGE UP (ref 0.27–4.2)
TUBE TYPE: SIGNIFICANT CHANGE UP
TUBE TYPE: SIGNIFICANT CHANGE UP
UROBILINOGEN FLD QL: ABNORMAL
UROBILINOGEN FLD QL: NEGATIVE — SIGNIFICANT CHANGE UP
VANCOMYCIN TROUGH SERPL-MCNC: 17 UG/ML — SIGNIFICANT CHANGE UP (ref 10–20)
VARIANT LYMPHS # BLD: 0.9 % — SIGNIFICANT CHANGE UP (ref 0–6)
WBC # BLD: 10.27 K/UL — SIGNIFICANT CHANGE UP (ref 3.8–10.5)
WBC # BLD: 10.48 K/UL — SIGNIFICANT CHANGE UP (ref 3.8–10.5)
WBC # BLD: 12.97 K/UL — HIGH (ref 3.8–10.5)
WBC # BLD: 13.19 K/UL — HIGH (ref 3.8–10.5)
WBC # BLD: 7.04 K/UL — SIGNIFICANT CHANGE UP (ref 3.8–10.5)
WBC # BLD: 7.93 K/UL — SIGNIFICANT CHANGE UP (ref 3.8–10.5)
WBC # BLD: 8.74 K/UL — SIGNIFICANT CHANGE UP (ref 3.8–10.5)
WBC # BLD: 9.3 K/UL — SIGNIFICANT CHANGE UP (ref 3.8–10.5)
WBC # FLD AUTO: 10.27 K/UL — SIGNIFICANT CHANGE UP (ref 3.8–10.5)
WBC # FLD AUTO: 10.48 K/UL — SIGNIFICANT CHANGE UP (ref 3.8–10.5)
WBC # FLD AUTO: 12.97 K/UL — HIGH (ref 3.8–10.5)
WBC # FLD AUTO: 13.19 K/UL — HIGH (ref 3.8–10.5)
WBC # FLD AUTO: 7.04 K/UL — SIGNIFICANT CHANGE UP (ref 3.8–10.5)
WBC # FLD AUTO: 7.93 K/UL — SIGNIFICANT CHANGE UP (ref 3.8–10.5)
WBC # FLD AUTO: 8.74 K/UL — SIGNIFICANT CHANGE UP (ref 3.8–10.5)
WBC # FLD AUTO: 9.3 K/UL — SIGNIFICANT CHANGE UP (ref 3.8–10.5)
WBC UR QL: 15 /HPF — HIGH (ref 0–5)
WBC UR QL: 25 /HPF — HIGH (ref 0–5)

## 2022-01-01 PROCEDURE — 86900 BLOOD TYPING SEROLOGIC ABO: CPT

## 2022-01-01 PROCEDURE — 49082 ABD PARACENTESIS: CPT

## 2022-01-01 PROCEDURE — 99231 SBSQ HOSP IP/OBS SF/LOW 25: CPT

## 2022-01-01 PROCEDURE — 76705 ECHO EXAM OF ABDOMEN: CPT | Mod: 26

## 2022-01-01 PROCEDURE — 85014 HEMATOCRIT: CPT

## 2022-01-01 PROCEDURE — 71260 CT THORAX DX C+: CPT | Mod: MA

## 2022-01-01 PROCEDURE — 93970 EXTREMITY STUDY: CPT | Mod: 26

## 2022-01-01 PROCEDURE — 99233 SBSQ HOSP IP/OBS HIGH 50: CPT | Mod: GC

## 2022-01-01 PROCEDURE — 85027 COMPLETE CBC AUTOMATED: CPT

## 2022-01-01 PROCEDURE — 89051 BODY FLUID CELL COUNT: CPT

## 2022-01-01 PROCEDURE — 83935 ASSAY OF URINE OSMOLALITY: CPT

## 2022-01-01 PROCEDURE — U0005: CPT

## 2022-01-01 PROCEDURE — 82533 TOTAL CORTISOL: CPT

## 2022-01-01 PROCEDURE — 87640 STAPH A DNA AMP PROBE: CPT

## 2022-01-01 PROCEDURE — 82570 ASSAY OF URINE CREATININE: CPT

## 2022-01-01 PROCEDURE — 74177 CT ABD & PELVIS W/CONTRAST: CPT | Mod: MA

## 2022-01-01 PROCEDURE — 84484 ASSAY OF TROPONIN QUANT: CPT

## 2022-01-01 PROCEDURE — 87086 URINE CULTURE/COLONY COUNT: CPT

## 2022-01-01 PROCEDURE — 83036 HEMOGLOBIN GLYCOSYLATED A1C: CPT

## 2022-01-01 PROCEDURE — 87102 FUNGUS ISOLATION CULTURE: CPT

## 2022-01-01 PROCEDURE — 80202 ASSAY OF VANCOMYCIN: CPT

## 2022-01-01 PROCEDURE — 99285 EMERGENCY DEPT VISIT HI MDM: CPT | Mod: 25

## 2022-01-01 PROCEDURE — 49083 ABD PARACENTESIS W/IMAGING: CPT

## 2022-01-01 PROCEDURE — 86850 RBC ANTIBODY SCREEN: CPT

## 2022-01-01 PROCEDURE — 84295 ASSAY OF SERUM SODIUM: CPT

## 2022-01-01 PROCEDURE — 99233 SBSQ HOSP IP/OBS HIGH 50: CPT

## 2022-01-01 PROCEDURE — 93010 ELECTROCARDIOGRAM REPORT: CPT

## 2022-01-01 PROCEDURE — 82962 GLUCOSE BLOOD TEST: CPT

## 2022-01-01 PROCEDURE — 93306 TTE W/DOPPLER COMPLETE: CPT

## 2022-01-01 PROCEDURE — 71045 X-RAY EXAM CHEST 1 VIEW: CPT

## 2022-01-01 PROCEDURE — 93970 EXTREMITY STUDY: CPT

## 2022-01-01 PROCEDURE — 86803 HEPATITIS C AB TEST: CPT

## 2022-01-01 PROCEDURE — 99223 1ST HOSP IP/OBS HIGH 75: CPT

## 2022-01-01 PROCEDURE — 85610 PROTHROMBIN TIME: CPT

## 2022-01-01 PROCEDURE — 99232 SBSQ HOSP IP/OBS MODERATE 35: CPT | Mod: GC

## 2022-01-01 PROCEDURE — 93306 TTE W/DOPPLER COMPLETE: CPT | Mod: 26

## 2022-01-01 PROCEDURE — 82435 ASSAY OF BLOOD CHLORIDE: CPT

## 2022-01-01 PROCEDURE — 74176 CT ABD & PELVIS W/O CONTRAST: CPT | Mod: 26

## 2022-01-01 PROCEDURE — 99223 1ST HOSP IP/OBS HIGH 75: CPT | Mod: GC

## 2022-01-01 PROCEDURE — 82947 ASSAY GLUCOSE BLOOD QUANT: CPT

## 2022-01-01 PROCEDURE — 83605 ASSAY OF LACTIC ACID: CPT

## 2022-01-01 PROCEDURE — 87205 SMEAR GRAM STAIN: CPT

## 2022-01-01 PROCEDURE — 84100 ASSAY OF PHOSPHORUS: CPT

## 2022-01-01 PROCEDURE — 96360 HYDRATION IV INFUSION INIT: CPT

## 2022-01-01 PROCEDURE — P9047: CPT

## 2022-01-01 PROCEDURE — 84105 ASSAY OF URINE PHOSPHORUS: CPT

## 2022-01-01 PROCEDURE — 84157 ASSAY OF PROTEIN OTHER: CPT

## 2022-01-01 PROCEDURE — 83735 ASSAY OF MAGNESIUM: CPT

## 2022-01-01 PROCEDURE — 86901 BLOOD TYPING SEROLOGIC RH(D): CPT

## 2022-01-01 PROCEDURE — 82945 GLUCOSE OTHER FLUID: CPT

## 2022-01-01 PROCEDURE — 80048 BASIC METABOLIC PNL TOTAL CA: CPT

## 2022-01-01 PROCEDURE — 76705 ECHO EXAM OF ABDOMEN: CPT

## 2022-01-01 PROCEDURE — 85018 HEMOGLOBIN: CPT

## 2022-01-01 PROCEDURE — 84132 ASSAY OF SERUM POTASSIUM: CPT

## 2022-01-01 PROCEDURE — 87040 BLOOD CULTURE FOR BACTERIA: CPT

## 2022-01-01 PROCEDURE — 87070 CULTURE OTHR SPECIMN AEROBIC: CPT

## 2022-01-01 PROCEDURE — 84300 ASSAY OF URINE SODIUM: CPT

## 2022-01-01 PROCEDURE — 82340 ASSAY OF CALCIUM IN URINE: CPT

## 2022-01-01 PROCEDURE — 71045 X-RAY EXAM CHEST 1 VIEW: CPT | Mod: 26

## 2022-01-01 PROCEDURE — 83615 LACTATE (LD) (LDH) ENZYME: CPT

## 2022-01-01 PROCEDURE — 71260 CT THORAX DX C+: CPT | Mod: 26,MA

## 2022-01-01 PROCEDURE — 82436 ASSAY OF URINE CHLORIDE: CPT

## 2022-01-01 PROCEDURE — 85025 COMPLETE CBC W/AUTO DIFF WBC: CPT

## 2022-01-01 PROCEDURE — 82803 BLOOD GASES ANY COMBINATION: CPT

## 2022-01-01 PROCEDURE — 99497 ADVNCD CARE PLAN 30 MIN: CPT

## 2022-01-01 PROCEDURE — C1880: CPT

## 2022-01-01 PROCEDURE — 83930 ASSAY OF BLOOD OSMOLALITY: CPT

## 2022-01-01 PROCEDURE — 74176 CT ABD & PELVIS W/O CONTRAST: CPT

## 2022-01-01 PROCEDURE — 83880 ASSAY OF NATRIURETIC PEPTIDE: CPT

## 2022-01-01 PROCEDURE — 37191 INS ENDOVAS VENA CAVA FILTR: CPT

## 2022-01-01 PROCEDURE — C1887: CPT

## 2022-01-01 PROCEDURE — 80053 COMPREHEN METABOLIC PANEL: CPT

## 2022-01-01 PROCEDURE — 81001 URINALYSIS AUTO W/SCOPE: CPT

## 2022-01-01 PROCEDURE — 84443 ASSAY THYROID STIM HORMONE: CPT

## 2022-01-01 PROCEDURE — C1769: CPT

## 2022-01-01 PROCEDURE — 0225U NFCT DS DNA&RNA 21 SARSCOV2: CPT

## 2022-01-01 PROCEDURE — 84133 ASSAY OF URINE POTASSIUM: CPT

## 2022-01-01 PROCEDURE — 80061 LIPID PANEL: CPT

## 2022-01-01 PROCEDURE — 36415 COLL VENOUS BLD VENIPUNCTURE: CPT

## 2022-01-01 PROCEDURE — 87641 MR-STAPH DNA AMP PROBE: CPT

## 2022-01-01 PROCEDURE — 84156 ASSAY OF PROTEIN URINE: CPT

## 2022-01-01 PROCEDURE — U0003: CPT

## 2022-01-01 PROCEDURE — 82042 OTHER SOURCE ALBUMIN QUAN EA: CPT

## 2022-01-01 PROCEDURE — 87075 CULTR BACTERIA EXCEPT BLOOD: CPT

## 2022-01-01 PROCEDURE — C1894: CPT

## 2022-01-01 PROCEDURE — C1729: CPT

## 2022-01-01 PROCEDURE — 74177 CT ABD & PELVIS W/CONTRAST: CPT | Mod: 26,MA

## 2022-01-01 PROCEDURE — 85730 THROMBOPLASTIN TIME PARTIAL: CPT

## 2022-01-01 PROCEDURE — 82330 ASSAY OF CALCIUM: CPT

## 2022-01-01 RX ORDER — POLYETHYLENE GLYCOL 3350 17 G/17G
17 POWDER, FOR SOLUTION ORAL DAILY
Refills: 0 | Status: DISCONTINUED | OUTPATIENT
Start: 2022-01-01 | End: 2022-01-01

## 2022-01-01 RX ORDER — TRAZODONE HCL 50 MG
1 TABLET ORAL
Qty: 0 | Refills: 0 | DISCHARGE

## 2022-01-01 RX ORDER — NYSTATIN 500MM UNIT
10 POWDER (EA) MISCELLANEOUS
Qty: 0 | Refills: 0 | DISCHARGE

## 2022-01-01 RX ORDER — KETOROLAC TROMETHAMINE 30 MG/ML
15 SYRINGE (ML) INJECTION ONCE
Refills: 0 | Status: DISCONTINUED | OUTPATIENT
Start: 2022-01-01 | End: 2022-01-01

## 2022-01-01 RX ORDER — MIDODRINE HYDROCHLORIDE 2.5 MG/1
10 TABLET ORAL ONCE
Refills: 0 | Status: COMPLETED | OUTPATIENT
Start: 2022-01-01 | End: 2022-01-01

## 2022-01-01 RX ORDER — VANCOMYCIN HCL 1 G
1000 VIAL (EA) INTRAVENOUS EVERY 12 HOURS
Refills: 0 | Status: DISCONTINUED | OUTPATIENT
Start: 2022-01-01 | End: 2022-01-01

## 2022-01-01 RX ORDER — CLOTRIMAZOLE 10 MG
1 TROCHE MUCOUS MEMBRANE
Qty: 0 | Refills: 0 | DISCHARGE

## 2022-01-01 RX ORDER — VANCOMYCIN HCL 1 G
1250 VIAL (EA) INTRAVENOUS EVERY 24 HOURS
Refills: 0 | Status: DISCONTINUED | OUTPATIENT
Start: 2022-01-01 | End: 2022-01-01

## 2022-01-01 RX ORDER — FUROSEMIDE 40 MG
1 TABLET ORAL
Qty: 0 | Refills: 0 | DISCHARGE

## 2022-01-01 RX ORDER — MORPHINE SULFATE 50 MG/1
1 CAPSULE, EXTENDED RELEASE ORAL
Qty: 30 | Refills: 0
Start: 2022-01-01 | End: 2022-01-01

## 2022-01-01 RX ORDER — SODIUM ZIRCONIUM CYCLOSILICATE 10 G/10G
10 POWDER, FOR SUSPENSION ORAL ONCE
Refills: 0 | Status: COMPLETED | OUTPATIENT
Start: 2022-01-01 | End: 2022-01-01

## 2022-01-01 RX ORDER — MIDODRINE HYDROCHLORIDE 2.5 MG/1
2 TABLET ORAL
Qty: 180 | Refills: 0
Start: 2022-01-01 | End: 2022-10-03

## 2022-01-01 RX ORDER — HYDROMORPHONE HYDROCHLORIDE 2 MG/ML
1 INJECTION INTRAMUSCULAR; INTRAVENOUS; SUBCUTANEOUS
Refills: 0 | Status: DISCONTINUED | OUTPATIENT
Start: 2022-01-01 | End: 2022-01-01

## 2022-01-01 RX ORDER — SODIUM CHLORIDE 9 MG/ML
2 INJECTION INTRAMUSCULAR; INTRAVENOUS; SUBCUTANEOUS
Refills: 0 | Status: DISCONTINUED | OUTPATIENT
Start: 2022-01-01 | End: 2022-01-01

## 2022-01-01 RX ORDER — HYDROMORPHONE HYDROCHLORIDE 2 MG/ML
0.25 INJECTION INTRAMUSCULAR; INTRAVENOUS; SUBCUTANEOUS ONCE
Refills: 0 | Status: DISCONTINUED | OUTPATIENT
Start: 2022-01-01 | End: 2022-01-01

## 2022-01-01 RX ORDER — GABAPENTIN 400 MG/1
200 CAPSULE ORAL
Refills: 0 | Status: DISCONTINUED | OUTPATIENT
Start: 2022-01-01 | End: 2022-01-01

## 2022-01-01 RX ORDER — OXYCODONE HYDROCHLORIDE 5 MG/1
10 TABLET ORAL EVERY 4 HOURS
Refills: 0 | Status: DISCONTINUED | OUTPATIENT
Start: 2022-01-01 | End: 2022-01-01

## 2022-01-01 RX ORDER — OMEPRAZOLE 10 MG/1
1 CAPSULE, DELAYED RELEASE ORAL
Qty: 0 | Refills: 0 | DISCHARGE

## 2022-01-01 RX ORDER — ALBUMIN HUMAN 25 %
50 VIAL (ML) INTRAVENOUS EVERY 6 HOURS
Refills: 0 | Status: DISCONTINUED | OUTPATIENT
Start: 2022-01-01 | End: 2022-01-01

## 2022-01-01 RX ORDER — HYDROMORPHONE HYDROCHLORIDE 2 MG/ML
1 INJECTION INTRAMUSCULAR; INTRAVENOUS; SUBCUTANEOUS EVERY 4 HOURS
Refills: 0 | Status: DISCONTINUED | OUTPATIENT
Start: 2022-01-01 | End: 2022-01-01

## 2022-01-01 RX ORDER — HEPARIN SODIUM 5000 [USP'U]/ML
5000 INJECTION INTRAVENOUS; SUBCUTANEOUS ONCE
Refills: 0 | Status: DISCONTINUED | OUTPATIENT
Start: 2022-01-01 | End: 2022-01-01

## 2022-01-01 RX ORDER — SODIUM CHLORIDE 9 MG/ML
500 INJECTION INTRAMUSCULAR; INTRAVENOUS; SUBCUTANEOUS ONCE
Refills: 0 | Status: COMPLETED | OUTPATIENT
Start: 2022-01-01 | End: 2022-01-01

## 2022-01-01 RX ORDER — CEFTRIAXONE 500 MG/1
2000 INJECTION, POWDER, FOR SOLUTION INTRAMUSCULAR; INTRAVENOUS ONCE
Refills: 0 | Status: COMPLETED | OUTPATIENT
Start: 2022-01-01 | End: 2022-01-01

## 2022-01-01 RX ORDER — ROSUVASTATIN CALCIUM 5 MG/1
1 TABLET ORAL
Qty: 0 | Refills: 0 | DISCHARGE

## 2022-01-01 RX ORDER — UREA 15 G
30 POWDER IN PACKET (EA) ORAL
Refills: 0 | Status: DISCONTINUED | OUTPATIENT
Start: 2022-01-01 | End: 2022-01-01

## 2022-01-01 RX ORDER — MIDODRINE HYDROCHLORIDE 2.5 MG/1
20 TABLET ORAL THREE TIMES A DAY
Refills: 0 | Status: DISCONTINUED | OUTPATIENT
Start: 2022-01-01 | End: 2022-01-01

## 2022-01-01 RX ORDER — ALBUMIN HUMAN 25 %
100 VIAL (ML) INTRAVENOUS EVERY 12 HOURS
Refills: 0 | Status: DISCONTINUED | OUTPATIENT
Start: 2022-01-01 | End: 2022-01-01

## 2022-01-01 RX ORDER — MORPHINE SULFATE 50 MG/1
1 CAPSULE, EXTENDED RELEASE ORAL ONCE
Refills: 0 | Status: DISCONTINUED | OUTPATIENT
Start: 2022-01-01 | End: 2022-01-01

## 2022-01-01 RX ORDER — GABAPENTIN 400 MG/1
2 CAPSULE ORAL
Qty: 0 | Refills: 0 | DISCHARGE

## 2022-01-01 RX ORDER — PANTOPRAZOLE SODIUM 20 MG/1
40 TABLET, DELAYED RELEASE ORAL
Refills: 0 | Status: DISCONTINUED | OUTPATIENT
Start: 2022-01-01 | End: 2022-01-01

## 2022-01-01 RX ORDER — ALBUMIN HUMAN 25 %
50 VIAL (ML) INTRAVENOUS EVERY 12 HOURS
Refills: 0 | Status: DISCONTINUED | OUTPATIENT
Start: 2022-01-01 | End: 2022-01-01

## 2022-01-01 RX ORDER — ONDANSETRON 8 MG/1
4 TABLET, FILM COATED ORAL ONCE
Refills: 0 | Status: COMPLETED | OUTPATIENT
Start: 2022-01-01 | End: 2022-01-01

## 2022-01-01 RX ORDER — CEFTRIAXONE 500 MG/1
2000 INJECTION, POWDER, FOR SOLUTION INTRAMUSCULAR; INTRAVENOUS EVERY 24 HOURS
Refills: 0 | Status: DISCONTINUED | OUTPATIENT
Start: 2022-01-01 | End: 2022-01-01

## 2022-01-01 RX ORDER — ALBUMIN HUMAN 25 %
100 VIAL (ML) INTRAVENOUS EVERY 6 HOURS
Refills: 0 | Status: DISCONTINUED | OUTPATIENT
Start: 2022-01-01 | End: 2022-01-01

## 2022-01-01 RX ORDER — ALLOPURINOL 300 MG
1 TABLET ORAL
Qty: 0 | Refills: 0 | DISCHARGE

## 2022-01-01 RX ORDER — SENNA PLUS 8.6 MG/1
2 TABLET ORAL AT BEDTIME
Refills: 0 | Status: DISCONTINUED | OUTPATIENT
Start: 2022-01-01 | End: 2022-01-01

## 2022-01-01 RX ORDER — MELOXICAM 15 MG/1
1 TABLET ORAL
Qty: 0 | Refills: 0 | DISCHARGE

## 2022-01-01 RX ORDER — LACTULOSE 10 G/15ML
30 SOLUTION ORAL
Qty: 0 | Refills: 0 | DISCHARGE

## 2022-01-01 RX ORDER — SODIUM CHLORIDE 0.65 %
1 AEROSOL, SPRAY (ML) NASAL
Refills: 0 | Status: DISCONTINUED | OUTPATIENT
Start: 2022-01-01 | End: 2022-01-01

## 2022-01-01 RX ORDER — ONDANSETRON 8 MG/1
1 TABLET, FILM COATED ORAL
Qty: 0 | Refills: 0 | DISCHARGE

## 2022-01-01 RX ORDER — MIDODRINE HYDROCHLORIDE 2.5 MG/1
10 TABLET ORAL THREE TIMES A DAY
Refills: 0 | Status: DISCONTINUED | OUTPATIENT
Start: 2022-01-01 | End: 2022-01-01

## 2022-01-01 RX ORDER — VANCOMYCIN HCL 1 G
1000 VIAL (EA) INTRAVENOUS ONCE
Refills: 0 | Status: COMPLETED | OUTPATIENT
Start: 2022-01-01 | End: 2022-01-01

## 2022-01-01 RX ORDER — CHLORHEXIDINE GLUCONATE 213 G/1000ML
1 SOLUTION TOPICAL DAILY
Refills: 0 | Status: DISCONTINUED | OUTPATIENT
Start: 2022-01-01 | End: 2022-01-01

## 2022-01-01 RX ORDER — ALBUMIN HUMAN 25 %
50 VIAL (ML) INTRAVENOUS EVERY 6 HOURS
Refills: 0 | Status: COMPLETED | OUTPATIENT
Start: 2022-01-01 | End: 2022-01-01

## 2022-01-01 RX ORDER — SODIUM CHLORIDE 9 MG/ML
1 INJECTION INTRAMUSCULAR; INTRAVENOUS; SUBCUTANEOUS
Qty: 0 | Refills: 0 | DISCHARGE

## 2022-01-01 RX ADMIN — MIDODRINE HYDROCHLORIDE 10 MILLIGRAM(S): 2.5 TABLET ORAL at 05:31

## 2022-01-01 RX ADMIN — CHLORHEXIDINE GLUCONATE 1 APPLICATION(S): 213 SOLUTION TOPICAL at 12:04

## 2022-01-01 RX ADMIN — CEFTRIAXONE 100 MILLIGRAM(S): 500 INJECTION, POWDER, FOR SOLUTION INTRAMUSCULAR; INTRAVENOUS at 11:45

## 2022-01-01 RX ADMIN — SENNA PLUS 2 TABLET(S): 8.6 TABLET ORAL at 22:01

## 2022-01-01 RX ADMIN — MIDODRINE HYDROCHLORIDE 20 MILLIGRAM(S): 2.5 TABLET ORAL at 17:10

## 2022-01-01 RX ADMIN — MIDODRINE HYDROCHLORIDE 20 MILLIGRAM(S): 2.5 TABLET ORAL at 17:19

## 2022-01-01 RX ADMIN — CHLORHEXIDINE GLUCONATE 1 APPLICATION(S): 213 SOLUTION TOPICAL at 13:38

## 2022-01-01 RX ADMIN — MIDODRINE HYDROCHLORIDE 20 MILLIGRAM(S): 2.5 TABLET ORAL at 06:15

## 2022-01-01 RX ADMIN — MIDODRINE HYDROCHLORIDE 20 MILLIGRAM(S): 2.5 TABLET ORAL at 18:34

## 2022-01-01 RX ADMIN — GABAPENTIN 200 MILLIGRAM(S): 400 CAPSULE ORAL at 17:49

## 2022-01-01 RX ADMIN — PANTOPRAZOLE SODIUM 40 MILLIGRAM(S): 20 TABLET, DELAYED RELEASE ORAL at 06:04

## 2022-01-01 RX ADMIN — GABAPENTIN 200 MILLIGRAM(S): 400 CAPSULE ORAL at 17:10

## 2022-01-01 RX ADMIN — Medication 50 MILLILITER(S): at 12:23

## 2022-01-01 RX ADMIN — CEFTRIAXONE 100 MILLIGRAM(S): 500 INJECTION, POWDER, FOR SOLUTION INTRAMUSCULAR; INTRAVENOUS at 14:29

## 2022-01-01 RX ADMIN — HYDROMORPHONE HYDROCHLORIDE 1 MILLIGRAM(S): 2 INJECTION INTRAMUSCULAR; INTRAVENOUS; SUBCUTANEOUS at 04:57

## 2022-01-01 RX ADMIN — MIDODRINE HYDROCHLORIDE 20 MILLIGRAM(S): 2.5 TABLET ORAL at 06:04

## 2022-01-01 RX ADMIN — CEFTRIAXONE 100 MILLIGRAM(S): 500 INJECTION, POWDER, FOR SOLUTION INTRAMUSCULAR; INTRAVENOUS at 12:28

## 2022-01-01 RX ADMIN — Medication 63.75 MILLIMOLE(S): at 09:51

## 2022-01-01 RX ADMIN — Medication 50 MILLILITER(S): at 23:10

## 2022-01-01 RX ADMIN — Medication 50 MILLILITER(S): at 17:37

## 2022-01-01 RX ADMIN — OXYCODONE HYDROCHLORIDE 10 MILLIGRAM(S): 5 TABLET ORAL at 12:05

## 2022-01-01 RX ADMIN — MIDODRINE HYDROCHLORIDE 20 MILLIGRAM(S): 2.5 TABLET ORAL at 18:16

## 2022-01-01 RX ADMIN — CEFTRIAXONE 100 MILLIGRAM(S): 500 INJECTION, POWDER, FOR SOLUTION INTRAMUSCULAR; INTRAVENOUS at 12:09

## 2022-01-01 RX ADMIN — OXYCODONE HYDROCHLORIDE 10 MILLIGRAM(S): 5 TABLET ORAL at 14:48

## 2022-01-01 RX ADMIN — Medication 50 MILLILITER(S): at 17:51

## 2022-01-01 RX ADMIN — MIDODRINE HYDROCHLORIDE 20 MILLIGRAM(S): 2.5 TABLET ORAL at 11:45

## 2022-01-01 RX ADMIN — POLYETHYLENE GLYCOL 3350 17 GRAM(S): 17 POWDER, FOR SOLUTION ORAL at 17:19

## 2022-01-01 RX ADMIN — SODIUM ZIRCONIUM CYCLOSILICATE 10 GRAM(S): 10 POWDER, FOR SUSPENSION ORAL at 12:29

## 2022-01-01 RX ADMIN — Medication 50 MILLILITER(S): at 17:49

## 2022-01-01 RX ADMIN — Medication 50 MILLILITER(S): at 17:55

## 2022-01-01 RX ADMIN — GABAPENTIN 200 MILLIGRAM(S): 400 CAPSULE ORAL at 06:11

## 2022-01-01 RX ADMIN — SENNA PLUS 2 TABLET(S): 8.6 TABLET ORAL at 22:26

## 2022-01-01 RX ADMIN — ONDANSETRON 4 MILLIGRAM(S): 8 TABLET, FILM COATED ORAL at 04:57

## 2022-01-01 RX ADMIN — CHLORHEXIDINE GLUCONATE 1 APPLICATION(S): 213 SOLUTION TOPICAL at 11:43

## 2022-01-01 RX ADMIN — PANTOPRAZOLE SODIUM 40 MILLIGRAM(S): 20 TABLET, DELAYED RELEASE ORAL at 06:21

## 2022-01-01 RX ADMIN — MIDODRINE HYDROCHLORIDE 20 MILLIGRAM(S): 2.5 TABLET ORAL at 12:47

## 2022-01-01 RX ADMIN — MIDODRINE HYDROCHLORIDE 20 MILLIGRAM(S): 2.5 TABLET ORAL at 12:10

## 2022-01-01 RX ADMIN — Medication 50 MILLILITER(S): at 06:16

## 2022-01-01 RX ADMIN — PANTOPRAZOLE SODIUM 40 MILLIGRAM(S): 20 TABLET, DELAYED RELEASE ORAL at 06:44

## 2022-01-01 RX ADMIN — OXYCODONE HYDROCHLORIDE 10 MILLIGRAM(S): 5 TABLET ORAL at 12:45

## 2022-01-01 RX ADMIN — PANTOPRAZOLE SODIUM 40 MILLIGRAM(S): 20 TABLET, DELAYED RELEASE ORAL at 06:09

## 2022-01-01 RX ADMIN — GABAPENTIN 200 MILLIGRAM(S): 400 CAPSULE ORAL at 06:04

## 2022-01-01 RX ADMIN — MIDODRINE HYDROCHLORIDE 20 MILLIGRAM(S): 2.5 TABLET ORAL at 13:42

## 2022-01-01 RX ADMIN — SENNA PLUS 2 TABLET(S): 8.6 TABLET ORAL at 21:23

## 2022-01-01 RX ADMIN — Medication 15 MILLIGRAM(S): at 15:19

## 2022-01-01 RX ADMIN — Medication 125 MILLIGRAM(S): at 13:09

## 2022-01-01 RX ADMIN — Medication 15 MILLIGRAM(S): at 18:44

## 2022-01-01 RX ADMIN — Medication 50 MILLILITER(S): at 06:21

## 2022-01-01 RX ADMIN — Medication 125 MILLIGRAM(S): at 14:13

## 2022-01-01 RX ADMIN — CHLORHEXIDINE GLUCONATE 1 APPLICATION(S): 213 SOLUTION TOPICAL at 11:40

## 2022-01-01 RX ADMIN — MIDODRINE HYDROCHLORIDE 20 MILLIGRAM(S): 2.5 TABLET ORAL at 12:04

## 2022-01-01 RX ADMIN — Medication 50 MILLILITER(S): at 12:42

## 2022-01-01 RX ADMIN — GABAPENTIN 200 MILLIGRAM(S): 400 CAPSULE ORAL at 06:21

## 2022-01-01 RX ADMIN — MIDODRINE HYDROCHLORIDE 20 MILLIGRAM(S): 2.5 TABLET ORAL at 11:39

## 2022-01-01 RX ADMIN — SENNA PLUS 2 TABLET(S): 8.6 TABLET ORAL at 22:47

## 2022-01-01 RX ADMIN — GABAPENTIN 200 MILLIGRAM(S): 400 CAPSULE ORAL at 17:54

## 2022-01-01 RX ADMIN — MIDODRINE HYDROCHLORIDE 20 MILLIGRAM(S): 2.5 TABLET ORAL at 05:12

## 2022-01-01 RX ADMIN — OXYCODONE HYDROCHLORIDE 10 MILLIGRAM(S): 5 TABLET ORAL at 14:55

## 2022-01-01 RX ADMIN — HYDROMORPHONE HYDROCHLORIDE 0.25 MILLIGRAM(S): 2 INJECTION INTRAMUSCULAR; INTRAVENOUS; SUBCUTANEOUS at 21:10

## 2022-01-01 RX ADMIN — Medication 50 MILLILITER(S): at 15:48

## 2022-01-01 RX ADMIN — PANTOPRAZOLE SODIUM 40 MILLIGRAM(S): 20 TABLET, DELAYED RELEASE ORAL at 05:12

## 2022-01-01 RX ADMIN — SENNA PLUS 2 TABLET(S): 8.6 TABLET ORAL at 23:00

## 2022-01-01 RX ADMIN — Medication 1 APPLICATION(S): at 18:08

## 2022-01-01 RX ADMIN — Medication 50 MILLILITER(S): at 22:52

## 2022-01-01 RX ADMIN — SODIUM CHLORIDE 500 MILLILITER(S): 9 INJECTION INTRAMUSCULAR; INTRAVENOUS; SUBCUTANEOUS at 14:20

## 2022-01-01 RX ADMIN — Medication 250 MILLIGRAM(S): at 03:55

## 2022-01-01 RX ADMIN — MIDODRINE HYDROCHLORIDE 20 MILLIGRAM(S): 2.5 TABLET ORAL at 05:52

## 2022-01-01 RX ADMIN — OXYCODONE HYDROCHLORIDE 10 MILLIGRAM(S): 5 TABLET ORAL at 00:00

## 2022-01-01 RX ADMIN — Medication 30 GRAM(S): at 06:22

## 2022-01-01 RX ADMIN — POLYETHYLENE GLYCOL 3350 17 GRAM(S): 17 POWDER, FOR SOLUTION ORAL at 12:04

## 2022-01-01 RX ADMIN — MIDODRINE HYDROCHLORIDE 20 MILLIGRAM(S): 2.5 TABLET ORAL at 12:33

## 2022-01-01 RX ADMIN — OXYCODONE HYDROCHLORIDE 10 MILLIGRAM(S): 5 TABLET ORAL at 06:09

## 2022-01-01 RX ADMIN — Medication 50 MILLILITER(S): at 05:38

## 2022-01-01 RX ADMIN — MIDODRINE HYDROCHLORIDE 20 MILLIGRAM(S): 2.5 TABLET ORAL at 22:27

## 2022-01-01 RX ADMIN — Medication 250 MILLIGRAM(S): at 17:33

## 2022-01-01 RX ADMIN — Medication 50 MILLILITER(S): at 05:11

## 2022-01-01 RX ADMIN — OXYCODONE HYDROCHLORIDE 10 MILLIGRAM(S): 5 TABLET ORAL at 13:24

## 2022-01-01 RX ADMIN — OXYCODONE HYDROCHLORIDE 10 MILLIGRAM(S): 5 TABLET ORAL at 06:48

## 2022-01-01 RX ADMIN — GABAPENTIN 200 MILLIGRAM(S): 400 CAPSULE ORAL at 17:53

## 2022-01-01 RX ADMIN — OXYCODONE HYDROCHLORIDE 10 MILLIGRAM(S): 5 TABLET ORAL at 23:10

## 2022-01-01 RX ADMIN — GABAPENTIN 200 MILLIGRAM(S): 400 CAPSULE ORAL at 05:31

## 2022-01-01 RX ADMIN — POLYETHYLENE GLYCOL 3350 17 GRAM(S): 17 POWDER, FOR SOLUTION ORAL at 10:11

## 2022-01-01 RX ADMIN — PANTOPRAZOLE SODIUM 40 MILLIGRAM(S): 20 TABLET, DELAYED RELEASE ORAL at 10:07

## 2022-01-01 RX ADMIN — MIDODRINE HYDROCHLORIDE 20 MILLIGRAM(S): 2.5 TABLET ORAL at 06:09

## 2022-01-01 RX ADMIN — Medication 1 APPLICATION(S): at 06:45

## 2022-01-01 RX ADMIN — GABAPENTIN 200 MILLIGRAM(S): 400 CAPSULE ORAL at 17:35

## 2022-01-01 RX ADMIN — MIDODRINE HYDROCHLORIDE 20 MILLIGRAM(S): 2.5 TABLET ORAL at 05:38

## 2022-01-01 RX ADMIN — GABAPENTIN 200 MILLIGRAM(S): 400 CAPSULE ORAL at 17:19

## 2022-01-01 RX ADMIN — CEFTRIAXONE 100 MILLIGRAM(S): 500 INJECTION, POWDER, FOR SOLUTION INTRAMUSCULAR; INTRAVENOUS at 13:39

## 2022-01-01 RX ADMIN — GABAPENTIN 200 MILLIGRAM(S): 400 CAPSULE ORAL at 05:38

## 2022-01-01 RX ADMIN — CHLORHEXIDINE GLUCONATE 1 APPLICATION(S): 213 SOLUTION TOPICAL at 12:44

## 2022-01-01 RX ADMIN — CHLORHEXIDINE GLUCONATE 1 APPLICATION(S): 213 SOLUTION TOPICAL at 12:00

## 2022-01-01 RX ADMIN — Medication 1 APPLICATION(S): at 05:13

## 2022-01-01 RX ADMIN — HYDROMORPHONE HYDROCHLORIDE 1 MILLIGRAM(S): 2 INJECTION INTRAMUSCULAR; INTRAVENOUS; SUBCUTANEOUS at 00:01

## 2022-01-01 RX ADMIN — OXYCODONE HYDROCHLORIDE 10 MILLIGRAM(S): 5 TABLET ORAL at 14:59

## 2022-01-01 RX ADMIN — MORPHINE SULFATE 1 MILLIGRAM(S): 50 CAPSULE, EXTENDED RELEASE ORAL at 18:31

## 2022-01-01 RX ADMIN — GABAPENTIN 200 MILLIGRAM(S): 400 CAPSULE ORAL at 17:33

## 2022-01-01 RX ADMIN — OXYCODONE HYDROCHLORIDE 10 MILLIGRAM(S): 5 TABLET ORAL at 10:07

## 2022-01-01 RX ADMIN — MIDODRINE HYDROCHLORIDE 10 MILLIGRAM(S): 2.5 TABLET ORAL at 23:31

## 2022-01-01 RX ADMIN — MIDODRINE HYDROCHLORIDE 20 MILLIGRAM(S): 2.5 TABLET ORAL at 17:34

## 2022-01-01 RX ADMIN — GABAPENTIN 200 MILLIGRAM(S): 400 CAPSULE ORAL at 18:17

## 2022-01-01 RX ADMIN — GABAPENTIN 200 MILLIGRAM(S): 400 CAPSULE ORAL at 19:02

## 2022-01-01 RX ADMIN — OXYCODONE HYDROCHLORIDE 10 MILLIGRAM(S): 5 TABLET ORAL at 09:51

## 2022-01-01 RX ADMIN — Medication 50 MILLILITER(S): at 01:10

## 2022-01-01 RX ADMIN — SENNA PLUS 2 TABLET(S): 8.6 TABLET ORAL at 21:30

## 2022-01-01 RX ADMIN — Medication 30 GRAM(S): at 17:11

## 2022-01-01 RX ADMIN — MIDODRINE HYDROCHLORIDE 20 MILLIGRAM(S): 2.5 TABLET ORAL at 11:43

## 2022-01-01 RX ADMIN — CHLORHEXIDINE GLUCONATE 1 APPLICATION(S): 213 SOLUTION TOPICAL at 12:09

## 2022-01-01 RX ADMIN — Medication 125 MILLIGRAM(S): at 14:32

## 2022-01-01 RX ADMIN — MIDODRINE HYDROCHLORIDE 20 MILLIGRAM(S): 2.5 TABLET ORAL at 06:21

## 2022-01-01 RX ADMIN — GABAPENTIN 200 MILLIGRAM(S): 400 CAPSULE ORAL at 05:12

## 2022-01-01 RX ADMIN — Medication 166.67 MILLIGRAM(S): at 14:17

## 2022-01-01 RX ADMIN — Medication 250 MILLIGRAM(S): at 06:43

## 2022-01-01 RX ADMIN — HYDROMORPHONE HYDROCHLORIDE 1 MILLIGRAM(S): 2 INJECTION INTRAMUSCULAR; INTRAVENOUS; SUBCUTANEOUS at 01:22

## 2022-01-01 RX ADMIN — Medication 1 APPLICATION(S): at 17:55

## 2022-01-01 RX ADMIN — MIDODRINE HYDROCHLORIDE 20 MILLIGRAM(S): 2.5 TABLET ORAL at 06:44

## 2022-01-01 RX ADMIN — HYDROMORPHONE HYDROCHLORIDE 1 MILLIGRAM(S): 2 INJECTION INTRAMUSCULAR; INTRAVENOUS; SUBCUTANEOUS at 12:33

## 2022-01-01 RX ADMIN — Medication 50 MILLILITER(S): at 06:05

## 2022-01-01 RX ADMIN — PANTOPRAZOLE SODIUM 40 MILLIGRAM(S): 20 TABLET, DELAYED RELEASE ORAL at 05:38

## 2022-01-01 RX ADMIN — GABAPENTIN 200 MILLIGRAM(S): 400 CAPSULE ORAL at 06:09

## 2022-01-01 RX ADMIN — HYDROMORPHONE HYDROCHLORIDE 1 MILLIGRAM(S): 2 INJECTION INTRAMUSCULAR; INTRAVENOUS; SUBCUTANEOUS at 16:50

## 2022-01-01 RX ADMIN — PANTOPRAZOLE SODIUM 40 MILLIGRAM(S): 20 TABLET, DELAYED RELEASE ORAL at 06:34

## 2022-01-01 RX ADMIN — MIDODRINE HYDROCHLORIDE 20 MILLIGRAM(S): 2.5 TABLET ORAL at 12:28

## 2022-01-01 RX ADMIN — SENNA PLUS 2 TABLET(S): 8.6 TABLET ORAL at 23:11

## 2022-01-01 RX ADMIN — HYDROMORPHONE HYDROCHLORIDE 1 MILLIGRAM(S): 2 INJECTION INTRAMUSCULAR; INTRAVENOUS; SUBCUTANEOUS at 12:05

## 2022-01-01 RX ADMIN — Medication 125 MILLIGRAM(S): at 23:11

## 2022-01-01 RX ADMIN — MIDODRINE HYDROCHLORIDE 10 MILLIGRAM(S): 2.5 TABLET ORAL at 19:01

## 2022-01-01 RX ADMIN — CHLORHEXIDINE GLUCONATE 1 APPLICATION(S): 213 SOLUTION TOPICAL at 12:52

## 2022-01-01 RX ADMIN — SODIUM CHLORIDE 500 MILLILITER(S): 9 INJECTION INTRAMUSCULAR; INTRAVENOUS; SUBCUTANEOUS at 13:19

## 2022-01-01 RX ADMIN — HYDROMORPHONE HYDROCHLORIDE 1 MILLIGRAM(S): 2 INJECTION INTRAMUSCULAR; INTRAVENOUS; SUBCUTANEOUS at 06:26

## 2022-01-01 RX ADMIN — MORPHINE SULFATE 1 MILLIGRAM(S): 50 CAPSULE, EXTENDED RELEASE ORAL at 19:55

## 2022-01-01 RX ADMIN — MIDODRINE HYDROCHLORIDE 20 MILLIGRAM(S): 2.5 TABLET ORAL at 17:33

## 2022-01-01 RX ADMIN — GABAPENTIN 200 MILLIGRAM(S): 400 CAPSULE ORAL at 06:44

## 2022-01-01 RX ADMIN — CEFTRIAXONE 100 MILLIGRAM(S): 500 INJECTION, POWDER, FOR SOLUTION INTRAMUSCULAR; INTRAVENOUS at 11:39

## 2022-01-01 RX ADMIN — HYDROMORPHONE HYDROCHLORIDE 1 MILLIGRAM(S): 2 INJECTION INTRAMUSCULAR; INTRAVENOUS; SUBCUTANEOUS at 00:11

## 2022-01-01 RX ADMIN — CEFTRIAXONE 100 MILLIGRAM(S): 500 INJECTION, POWDER, FOR SOLUTION INTRAMUSCULAR; INTRAVENOUS at 15:10

## 2022-01-01 RX ADMIN — GABAPENTIN 200 MILLIGRAM(S): 400 CAPSULE ORAL at 05:52

## 2022-01-01 RX ADMIN — SODIUM CHLORIDE 2 GRAM(S): 9 INJECTION INTRAMUSCULAR; INTRAVENOUS; SUBCUTANEOUS at 19:38

## 2022-01-01 RX ADMIN — Medication 50 MILLILITER(S): at 05:52

## 2022-01-01 RX ADMIN — SODIUM CHLORIDE 2 GRAM(S): 9 INJECTION INTRAMUSCULAR; INTRAVENOUS; SUBCUTANEOUS at 05:31

## 2022-01-01 RX ADMIN — MIDODRINE HYDROCHLORIDE 20 MILLIGRAM(S): 2.5 TABLET ORAL at 17:48

## 2022-01-01 RX ADMIN — CHLORHEXIDINE GLUCONATE 1 APPLICATION(S): 213 SOLUTION TOPICAL at 12:30

## 2022-08-26 NOTE — H&P ADULT - NSHPREVIEWOFSYSTEMS_GEN_ALL_CORE
Vital Signs Last 24 Hrs  T(C): 36.4 (26 Aug 2022 11:58), Max: 36.7 (26 Aug 2022 11:38)  T(F): 97.6 (26 Aug 2022 11:58), Max: 98.1 (26 Aug 2022 11:38)  HR: 99 (26 Aug 2022 15:03) (99 - 108)  BP: 95/62 (26 Aug 2022 15:03) (95/62 - 99/64)  BP(mean): 73 (26 Aug 2022 15:03) (73 - 75)  RR: 20 (26 Aug 2022 15:04) (18 - 22)  SpO2: 95% (26 Aug 2022 15:04) (92% - 96%)    REVIEW OF SYSTEMS:    CONSTITUTIONAL: No weakness, fevers or chills  EYES/ENT: No visual changes;  No vertigo or throat pain   NECK: No pain or stiffness  RESPIRATORY: No cough, wheezing, hemoptysis; No shortness of breath  CARDIOVASCULAR: No chest pain or palpitations  GASTROINTESTINAL: No abdominal or epigastric pain. No nausea, vomiting, or hematemesis; No diarrhea or constipation. No melena or hematochezia.  GENITOURINARY: No dysuria, frequency or hematuria  NEUROLOGICAL: No numbness or weakness  SKIN: No itching, burning, rashes, or lesions   All other review of systems is negative unless indicated above.    Parameters below as of 26 Aug 2022 15:04  Patient On (Oxygen Delivery Method): room air Vital Signs Last 24 Hrs  T(C): 36.4 (26 Aug 2022 11:58), Max: 36.7 (26 Aug 2022 11:38)  T(F): 97.6 (26 Aug 2022 11:58), Max: 98.1 (26 Aug 2022 11:38)  HR: 99 (26 Aug 2022 15:03) (99 - 108)  BP: 95/62 (26 Aug 2022 15:03) (95/62 - 99/64)  BP(mean): 73 (26 Aug 2022 15:03) (73 - 75)  RR: 20 (26 Aug 2022 15:04) (18 - 22)  SpO2: 95% (26 Aug 2022 15:04) (92% - 96%)      REVIEW OF SYSTEMS:    CONSTITUTIONAL: No weakness, fevers or chills  EYES/ENT: No visual changes;  No vertigo or throat pain   NECK: No pain or stiffness  RESPIRATORY: No cough, wheezing, hemoptysis; No shortness of breath  CARDIOVASCULAR: No chest pain or palpitations  GASTROINTESTINAL: No abdominal or epigastric pain. No nausea, vomiting, or hematemesis; No diarrhea or constipation. No melena or hematochezia.  GENITOURINARY: No dysuria, frequency or hematuria  NEUROLOGICAL: No numbness or weakness  SKIN: No itching, burning, rashes, or lesions   All other review of systems is negative unless indicated above.    Parameters below as of 26 Aug 2022 15:04  Patient On (Oxygen Delivery Method): room air

## 2022-08-26 NOTE — CHART NOTE - NSCHARTNOTEFT_GEN_A_CORE
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 Tito Valentin of [facility] s/p Gemcitabin (dose reduced) /Durvalumab LD 8/19/2022 presenting with hypotension 70's/60's,  noted to be hyponatremic (Na 127) and CHA with volume depletion. Patient received a bolus of NS of 500mL with improvement of BP to 80/60s.    Of note patient was recently admitted to [facility] for SOB s/p left thoracentesis and was noted to be hyponatremic on that admission and was DC'd on salt tabs 1g BID, 1L fluid restriction and Lasix 40 mg for LE edema.    Additional records being sent from [facility]. Full consult to follow.    If admitted to medicine please admit to Dr. Bharat Edmondson who is aware of patient.    Thank you.    José Arambula PA-C  Hematology/Oncology  [facility]   [phone] (office)  [phone] (alt office)  Evenings and weekends please call MD on call or office

## 2022-08-26 NOTE — ED ADULT NURSE REASSESSMENT NOTE - NS ED NURSE REASSESS COMMENT FT1
Pt daughter expressed concerns for Pt pain/ pressure/ bruising of buttocks. ED RN suggested to reposition pt and place nikolai cream and wound [id number] patch. Daughter refused and requesting to talk to wound specialist. Pt states he has stabbing  pain in suprapubic area. ED RN states bladder scan will be performed.

## 2022-08-26 NOTE — CONSULT NOTE ADULT - ATTENDING COMMENTS
Events that transpired leading to the patient's current hospitalization was reviewed.  The patient's past medical history/surgical history/family history/social history was gone over.  Agree with physical examination as noted above.    The patient reports that he was in usual state of health until approximate 3 weeks ago during which time he developed worsening abdominal distention, shortness of breath, dyspnea upon exertion, fatigue and lower extremity edema.  She denies any recent travel or changes in his medications.  He continues to receive chemotherapy from the [facility] team (s/p Gemcitabin (dose reduced) /Durvalumab LD 8/19/2022).  He denies any reports of low platelet count.  Denies any blood in his stool or urine.    He was recent admitted to [facility] for shortness of breath status post left thoracentesis.  During which time was noted to be hyponatremic and was discharged on salt tablets 1 g twice daily, fluid restriction and Lasix 40 mg for lower extremity edema.  Earlier today he reports being seen at [facility] for evaluation.    Incidentally he was found to have a left lower lung CT scan and to have marked thrombocytopenia which is likely secondary to chemotherapy.  Discussed with patient what is a pulmonary embolism.  The associated signs and signs of pulmonary embolism reviewed along with the various treatment options.  The patient is at increased risk for bleeding especially if he was to receive anticoagulation therapy.  At this time the patient is felt to be at too high risk to receive anticoagulation therapy.  Would appreciate assistance from oncology team to help to determine when platelet count should rise if it is secondary to chemotherapy and their recommendations concerning anticoagulation therapy.    Would recommend that venous duplex study be performed.    Mild elevation in troponin which is likely secondary to demand ischemia.  Would recommend that a TTE be performed.      The patient has stage IV gallbladder cancer.  To assist in the patient's management it would be beneficial to know the patient's prognosis.  Would recommend goals of care conversation to be had.    All questions concerns of the patient were addressed.    Findings and plan were discussed with hospitalist/Dr. Godoy.
Tahira Juarez MD  Off: [phone]  contact me on teams    (After 5 pm or on weekends please page the on-call fellow/attending, can check [email or web address] for schedule. Login is good william, schedule under [facility] medicine, psych, derm)

## 2022-08-26 NOTE — CONSULT NOTE ADULT - ASSESSMENT
Interventional Radiology    Evaluate for Procedure: PAracentesis     HPI: Patient is a 72 year old Male with PMHx of gallbladder cancer with metastasis to the peritoneum, right pleura, liver, hyponatremia who presents to [facility] from outside laboratory for hypotension, CHA and hyponatremia. Patient admits to abdominal distention, and was recently admitted for a thoracentesis. Pt found to have ascites referred for Paracentesis.     Allergies:   Medications (Abx/Cardiac/Anticoagulation/Blood Products)  cefTRIAXone   IVPB: 100 mL/Hr IV Intermittent (08-26 @ 15:10)    Data:  182.9  111.7  T(C): 36.4  HR: 99  BP: 95/62  RR: 20  SpO2: 95%    -WBC 9.30 / HgB 14.8 / Hct 46.2 / Plt 25  -Na 127 / Cl 88 / BUN 53 / Glucose 143  -K 4.9 / CO2 26 / Cr 1.25  -ALT 63 / Alk Phos 537 / T.Bili 0.7    Radiology:     Assessment/Plan: 72 year old Male with PMHx of gallbladder cancer with metastasis to the peritoneum, right pleura, liver, hyponatremia who presents to [facility] from outside laboratory for hypotension, CHA and hyponatremia. Patient admits to abdominal distention, and was recently admitted for a thoracentesis. Pt found to have ascites referred for Paracentesis.      - Place order under Xiang.  - Plan for paracentesis on 8/29  - Need PLT > 30  - NEED COVID TEST WITHIN  5 DAYS OF PLANNED PROCEDURE.  - Pt  Doesn't need to be NPO.   - Needs STAT CBC, BMP, Coags in AM.  -  D/w NP reyes

## 2022-08-26 NOTE — CONSULT NOTE ADULT - ASSESSMENT
72y male pmh IV gallbladder cancer with mets to the peritomeum, right pleura and liver, Hyponatremia, Pt was referred to ed for c/o low bp, PT c/o feeling tightness in abd. No fever, chills. cough. loc, lightheaded, nvdc, PE Eldery male looking mildy chroncially ill, heent normocephalic atraumatic neck supple chest decreased bs rt chest post,. abd distended. 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 Tito Valentin of [facility] s/p Gemcitabine (dose reduced) /Durvalumab LD 8/19/2022 presenting with hypotension 70's/60's,  noted to be hyponatremic (Na 127) and CHA with volume depletion. Patient received a bolus of NS of 500mL with improvement of BP to 80/60s.    Of note patient was recently admitted to [facility] for SOB s/p left thoracentesis and was noted to be hyponatremic on that admission and was DC'd on salt tabs 1g BID, 1L fluid restriction and Lasix 40 mg for LE edema.    Stage IV Ca Gallbladder  --Under care by Dr. Tito Valentin of [facility]  --S/P Gemcitabine/Durvalumab (LD 8/19/2022)  --Ongoing treatment after discharge    Hyponatremia  --Recently treated at [facility]  --Had been on Na tabs and fluid restriction  --Na 127 on admission  --Treatment per primary team, nephrology    CHA  --Improved with IVF  --Most likely secondary to volume depletion  --Creatinine currently normal at 1.25    Hypotension  --Most likely secondary to volume depletion as well  --Improved with IVF    Thrombocytopenia  --Platelets were reported at 27K at [facility] today 8/26  --Most likely secondary to chemotherapy  --Please check PT/PTT and Fibrinogen to rule out consumption   --Please transfuse platelets if <10K or <50K with bleeding or prior to procedures    We will continue to follow patient and coordinate with [facility]    José Arambula PA-C  Hematology/Oncology  [facility]   [phone] (office)  [phone] (alt office)  Evenings and weekends please call MD on call or office     72y male pmh IV gallbladder cancer with mets to the peritomeum, right pleura and liver, Hyponatremia, Pt was referred to ed for c/o low bp, PT c/o feeling tightness in abd. No fever, chills. cough. loc, lightheaded, nvdc, PE Elderly male looking mildy chroncially ill, heent normocephalic atraumatic neck supple chest decreased bs rt chest post,. abd distended. 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 Tito Valentin of [facility] s/p Gemcitabine (dose reduced) /Durvalumab LD 8/19/2022 presenting with hypotension 70's/60's,  noted to be hyponatremic (Na 127) and CHA with volume depletion. Patient received a bolus of NS of 500mL with improvement of BP to 80/60s.    Of note patient was recently admitted to [facility] for SOB s/p left thoracentesis and was noted to be hyponatremic on that admission and was DC'd on salt tabs 1g BID, 1L fluid restriction and Lasix 40 mg for LE edema.    Stage IV Ca Gallbladder  --Under care by Dr. Tito Valentin of [facility]  --S/P Gemcitabine (reduced dose)/Durvalumab (LD 8/19/2022)  --Ongoing treatment after discharge    Ascites  --Profound abdominal ascites causing severe pain  --Recommend abdominal ultrasound an urgent paracentesis with IR to relieve pain and pressure  --Pain meds until procedure can be completed    Hyponatremia  --Recently treated at [facility]  --Had been on Na tabs and fluid restriction  --Na 127 on admission  --Treatment per primary team, nephrology    CHA  --Improved with IVF  --Most likely secondary to volume depletion  --Creatinine currently normal at 1.25    Hypotension  --Most likely secondary to volume depletion as well  --Improved with IVF    Thrombocytopenia  --Platelets were reported at 27K at [facility] today 8/26  --Most likely secondary to chemotherapy  --However, lasix can also cause thrombocytopenia  --Please check PT/PTT and Fibrinogen to rule out consumption coagulopathy  --Please transfuse platelets if <10K or <50K with bleeding or prior to procedures    Discussed case with ED team and Dr. Edmondson who will be accepting patient for admission.    We will continue to follow patient and coordinate with [facility]    José Arambula PA-C  Hematology/Oncology  [facility]   [phone] (office)  [phone] (alt office)  Evenings and weekends please call MD on call or office     72y male pmh IV gallbladder cancer with mets to the peritomeum, right pleura and liver, Hyponatremia, Pt was referred to ed for c/o low bp, PT c/o feeling tightness in abd. No fever, chills. cough. loc, lightheaded, nvdc, PE Elderly male looking mildy chroncially ill, heent normocephalic atraumatic neck supple chest decreased bs rt chest post,. abd distended. 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 Tito Valentin of [facility] s/p Gemcitabine (dose reduced) /Durvalumab LD 8/19/2022 presenting with hypotension 70's/60's,  noted to be hyponatremic (Na 127) and CHA with volume depletion. Patient received a bolus of NS of 500mL with improvement of BP to 80/60s.    Of note patient was recently admitted to [facility] for SOB s/p left thoracentesis and was noted to be hyponatremic on that admission and was DC'd on salt tabs 1g BID, 1L fluid restriction and Lasix 40 mg for LE edema.    Stage IV Ca Gallbladder  --Under care by Dr. Tito Valentin of [facility]  --S/P Gemcitabine (reduced dose)/Durvalumab (LD 8/19/2022)  --Ongoing treatment after discharge    Ascites  --Profound abdominal ascites causing severe pain  --Recommend abdominal ultrasound an urgent paracentesis with IR to relieve pain and pressure  --Pain meds until procedure can be completed    Pulmonary Embolus  --Have been notified verbally that patient has a new PE  --Cannot anticoagulate with platelets of 20K  --Will get bilateral LE doppler  --If positive could consider IVC filter  --Vascular consultation to be called    Hyponatremia  --Recently treated at [facility]  --Had been on Na tabs and fluid restriction  --Na 127 on admission  --Treatment per primary team, nephrology    CHA  --Improved with IVF  --Most likely secondary to volume depletion  --Creatinine currently normal at 1.25    Hypotension  --Most likely secondary to volume depletion as well  --Improved with IVF    Thrombocytopenia  --Platelets were reported at 27K at [facility] today 8/26  --Most likely secondary to chemotherapy  --However, lasix can also cause thrombocytopenia  --Please check PT/PTT and Fibrinogen to rule out consumption coagulopathy  --Please transfuse platelets if <10K or <50K with bleeding or prior to procedures    Discussed case with ED team and Dr. Edmondson who will be accepting patient for admission.    We will continue to follow patient and coordinate with [facility]    José Arambula PA-C  Hematology/Oncology  [facility]   [phone] (office)  [phone] (alt office)  Evenings and weekends please call MD on call or office

## 2022-08-26 NOTE — ED PROVIDER NOTE - PROGRESS NOTE DETAILS
Discussed with Dr Delvis Argutea MD, Facep patient did not tolerate ct scan, hx of low platelets that makes paracentesis contraindicated in ED. Will defer to inpatient visit and treat empirally with ceftriaxone.     Ann Silva MD, PGY2 patient has pe as per CT read. currently admitted to dr. frazier.     Ann Silva MD, PGY2 Dr Edmondson contacted. Will consult w Onc to determine tx for pe.  Maksim Argueta MD, Facep

## 2022-08-26 NOTE — ED ADULT NURSE NOTE - OBJECTIVE STATEMENT
71YO male with PMH of stage IV gallbladder carcinoma metastatic to peritoneum, Left thoracentesis, Gout, HTN, GERD, and Neuropathy via EMS from [facility] for cancer in presenting with complaints of tachy & hypotension. Pt stated that on today's visit for chemo which he gets every three weeks, he was inform of being tachy and hypotensive prompting ED visit. Pt did not receive his chemo treatment to day. Pt Axox4, respirations even, & labored with movement and talk-states to be his baseline. Sinus tachy on cardiac monitor lead 2. Skin warm, dry, B/L edema +3. Pt is requesting port to be accessed for blood work-- will inform MD. Pt denies chest pain, palpitations, shortness of breath, headache, fever, chills, diaphoresis,  nausea, or vomiting. Pt placed in position of comfort. Pt educated on call bell system and provided call bell. Bed in lowest position, wheels locked, appropriate side rails raised. Pt denies needs at this time.

## 2022-08-26 NOTE — H&P ADULT - NSHPPHYSICALEXAM_GEN_ALL_CORE
Appearance: Normal	  HEENT:   Normal oral mucosa, PERRL, EOMI	  Lymphatic: No lymphadenopathy , no edema  Cardiovascular: Normal S1 S2, No JVD, No murmurs , Peripheral pulses palpable 2+ bilaterally  Respiratory: Lungs clear to auscultation, normal effort 	  Gastrointestinal:  Soft, Non-tender, + BS	  Skin: No rashes, No ecchymoses, No cyanosis, warm to touch  Musculoskeletal: Normal range of motion, normal strength  Psychiatry:  Mood & affect appropriate  Ext: No edema Appearance: Weak appearing male 	  HEENT:   Normal oral mucosa, PERRL, EOMI	  Lymphatic: No lymphadenopathy , no edema  Cardiovascular: Normal S1 S2, No JVD, No murmurs , Peripheral pulses palpable 2+ bilaterally  Respiratory: Decreased BS at bases 	  Gastrointestinal:  +Abdominal distention 	  Skin: No rashes, No ecchymoses, No cyanosis, warm to touch  Musculoskeletal: Normal range of motion, normal strength  Psychiatry:  Mood & affect appropriate  Ext: B/L LE edema

## 2022-08-26 NOTE — CONSULT NOTE ADULT - SUBJECTIVE AND OBJECTIVE BOX
[facility] DIVISION OF KIDNEY DISEASES AND HYPERTENSION -- [phone]  -- INITIAL CONSULT NOTE  --------------------------------------------------------------------------------  HPI:  72 y.o M w/ hx of gallbladder cancer with metastasis who presents to hospital for hypotension.  On presentation patient found to have a creatinine of 1.25 BUN 53,  and hyponatremia of 127. VBG of 7.44/31. SBP 70-60s on presentation, improved to to 80/60s with 500 cc of fluids. Patient reports to being admitted to [facility] for SOB s/p left thoracentesis and placed on salt tabs 1g BID, 1L fluid restriction and lasix 40 mg for LE edema.     Patient started on ceftriaxone, and given pain management with ketorolac and dilaudid       PAST HISTORY  --------------------------------------------------------------------------------  PAST MEDICAL & SURGICAL HISTORY:  Gallbladder cancer        FAMILY HISTORY:    PAST SOCIAL HISTORY:    ALLERGIES & MEDICATIONS  --------------------------------------------------------------------------------  Allergies    No Known Allergies    Intolerances      Standing Inpatient Medications  gabapentin 200 milliGRAM(s) Oral two times a day    PRN Inpatient Medications  HYDROmorphone  Injectable 1 milliGRAM(s) IV Push every 4 hours PRN      REVIEW OF SYSTEMS  --------------------------------------------------------------------------------  Gen: No fevers/chills  Skin: No rashes  Head/Eyes/Ears: Normal hearing,   Respiratory: No dyspnea, cough  CV: No chest pain  GI: No abdominal pain, diarrhea  : No dysuria, hematuria  MSK: No  edema  Heme: No easy bruising or bleeding  Psych: No significant depression    All other systems were reviewed and are negative, except as noted.    VITALS/PHYSICAL EXAM  --------------------------------------------------------------------------------  T(C): 36.4 (08-26-22 @ 11:58), Max: 36.7 (08-26-22 @ 11:38)  HR: 99 (08-26-22 @ 15:03) (99 - 108)  BP: 95/62 (08-26-22 @ 15:03) (95/62 - 99/64)  RR: 20 (08-26-22 @ 15:04) (18 - 22)  SpO2: 95% (08-26-22 @ 15:04) (92% - 96%)  Wt(kg): --  Height (cm): 182.9 (08-26-22 @ 11:38)  Weight (kg): 108.9 (08-26-22 @ 11:38)  BMI (kg/m2): 32.6 (08-26-22 @ 11:38)  BSA (m2): 2.3 (08-26-22 @ 11:38)      Physical Exam:  	Gen: NAD  	HEENT: MMM  	Pulm: CTA B/L  	CV: S1S2  	Abd: Soft, +BS   	Ext: No LE edema B/L  	Neuro: Awake  	Skin: Warm and dry  	Vascular access:    LABS/STUDIES  --------------------------------------------------------------------------------              14.8   9.30  >-----------<  25       [08-26-22 @ 13:06]              46.2     127  |  88  |  53  ----------------------------<  143      [08-26-22 @ 13:06]  4.9   |  26  |  1.25        Ca     8.9     [08-26-22 @ 13:06]    TPro  6.0  /  Alb  2.5  /  TBili  0.7  /  DBili  x   /  AST  67  /  ALT  63  /  AlkPhos  537  [08-26-22 @ 13:06]          Creatinine Trend:  SCr 1.25 [08-26 @ 13:06]             [facility] DIVISION OF KIDNEY DISEASES AND HYPERTENSION -- [phone]  -- INITIAL CONSULT NOTE  --------------------------------------------------------------------------------  HPI:  72 y.o M w/ hx of gallbladder cancer with metastasis who presents to hospital for hypotension.  On presentation patient found to have a creatinine of 1.25 BUN 53,  and hyponatremia of 127. VBG of 7.44/31. SBP 70-60s on presentation, improved to to 80/60s with 500 cc of fluids. Patient also found to have new PE during this admission, can't get AC due to thrombocytopenia. At the time of my exam, patient with BP of 105/60.    Nephrology consulted for hyponatremia. Daughter at bedside helps provide history. Patient was initially diagnosed with gallbladder cancer at [facility] in July when he was undergoing a planned gall bladder removal. Patient has received 1 dose of gemcitabine/durvalumab with Dr. Tito Valentin, [facility]. Patient then had worsening SOB beginning this month, and to [facility] for SOB s/p left thoracentesis and placed on salt tabs 1g BID, 1L fluid restriction and lasix 40 mg for LE edema. Currently, he reports to having poor PO intake, eats ice cream and drinks lots of water and endorses polyuria. He is compliant with his medications.     Patient treated with ceftriaxone, and given pain management with ketorolac and dilaudid. Heme onc following.       PAST HISTORY  --------------------------------------------------------------------------------  PAST MEDICAL & SURGICAL HISTORY:  Gallbladder cancer        FAMILY HISTORY:    PAST SOCIAL HISTORY:    ALLERGIES & MEDICATIONS  --------------------------------------------------------------------------------  Allergies    No Known Allergies    Intolerances      Standing Inpatient Medications  gabapentin 200 milliGRAM(s) Oral two times a day    PRN Inpatient Medications  HYDROmorphone  Injectable 1 milliGRAM(s) IV Push every 4 hours PRN      REVIEW OF SYSTEMS  --------------------------------------------------------------------------------  Gen: No fevers/chills +fatigue, poor oral intake   Skin: No rashes  Head/Eyes/Ears: Normal hearing,   Respiratory: No dyspnea, cough  CV: No chest pain  GI: + abdominal pain, diarrhea  : No dysuria, hematuria  MSK: No  edema  Heme: No easy bruising or bleeding  Psych: No significant depression    All other systems were reviewed and are negative, except as noted.    VITALS/PHYSICAL EXAM  --------------------------------------------------------------------------------  T(C): 36.4 (08-26-22 @ 11:58), Max: 36.7 (08-26-22 @ 11:38)  HR: 99 (08-26-22 @ 15:03) (99 - 108)  BP: 95/62 (08-26-22 @ 15:03) (95/62 - 99/64)  RR: 20 (08-26-22 @ 15:04) (18 - 22)  SpO2: 95% (08-26-22 @ 15:04) (92% - 96%)  Wt(kg): --  Height (cm): 182.9 (08-26-22 @ 11:38)  Weight (kg): 108.9 (08-26-22 @ 11:38)  BMI (kg/m2): 32.6 (08-26-22 @ 11:38)  BSA (m2): 2.3 (08-26-22 @ 11:38)      Physical Exam:  	Gen: NAD  	HEENT: MMM  	Pulm: inspiratory crackles bibasilar region L>R  	CV: S1S2  	Abd: firm, distended with fluid shift  	Ext: 2+ edema   	Neuro: Awake  	Skin: Warm and dry    LABS/STUDIES  --------------------------------------------------------------------------------              14.8   9.30  >-----------<  25       [08-26-22 @ 13:06]              46.2     127  |  88  |  53  ----------------------------<  143      [08-26-22 @ 13:06]  4.9   |  26  |  1.25        Ca     8.9     [08-26-22 @ 13:06]    TPro  6.0  /  Alb  2.5  /  TBili  0.7  /  DBili  x   /  AST  67  /  ALT  63  /  AlkPhos  537  [08-26-22 @ 13:06]          Creatinine Trend:  SCr 1.25 [08-26 @ 13:06]

## 2022-08-26 NOTE — ED PROVIDER NOTE - PHYSICAL EXAMINATION
GENERAL: no acute distress, non-toxic appearing  HEAD: normocephalic, atraumatic  HEENT: normal conjunctiva, oral mucosa moist, neck supple  CARDIAC: regular rate and rhythm, normal S1 and S2,  no appreciable murmurs  PULM: clear to ascultation bilaterally, no crackles, rales, rhonchi, or wheezing  GI: +abdomen distended with fluid wave, tense, nontender, no guarding or rebound tenderness  : no CVA tenderness, no suprapubic tenderness  NEURO: alert and oriented x 3, normal speech, moving all extremities   MSK: + 2+ LE pitting edema no visible deformities, no peripheral edema, calf tenderness/redness/swelling  SKIN: no visible rashes, dry, well-perfused  PSYCH: appropriate mood and affect

## 2022-08-26 NOTE — CONSULT NOTE ADULT - PROBLEM SELECTOR RECOMMENDATION 9
Patient with hyponatremia of 127 likely volume overloaded vs polydipsia with concurrent polyuria vs SIADH. Per daughter, was 127 at [facility] earlier this month and patient has been drinking lots of water as he feels very thirsty.     Please fluid restrict patient to 1L, increase salt tabs to 2g TID. Consider paracentesis with concurrent 6-8g of albumin/Liter removed. It will help with hemodynamics and prevent intravascular volume depletion. Please obtain Urinalysis, Urine lytes, and Uprot/creat. Avoid further volume resuscitation as patient is very volume overloaded. Hold diuretics for now    If you have any questions, please feel free to contact me  Al Zhong  Nephrology Fellow  [phone]; Prefer [employer] TEAMS  (After 5pm or on weekends please page the on-call fellow).    Patient was discussed with Dr. Juarez who agrees with my A/P. Addendum to follow Patient with hyponatremia of 127 likely volume overloaded vs polydipsia with concurrent polyuria vs SIADH. Per daughter, was 127 at [facility] earlier this month and patient has been drinking lots of water as he feels very thirsty.     Please fluid restrict patient to 1L. Please stop the home salt tabs and start Urena 30 mg BID for now. Consider paracentesis with concurrent 6-8g of albumin/Liter removed. It will help with hemodynamics and prevent intravascular volume depletion. Please obtain Urinalysis, Urine lytes, and Uprot/creat. Please order TSH and Cortisol.  Avoid further volume resuscitation as patient is very volume overloaded. Hold diuretics for now as low BP    If you have any questions, please feel free to contact me  Al Zhong  Nephrology Fellow  [phone]; Prefer [employer] TEAMS  (After 5pm or on weekends please page the on-call fellow).    Patient was discussed with Dr. Juarez who agrees with my A/P. Addendum to follow

## 2022-08-26 NOTE — ED ADULT NURSE REASSESSMENT NOTE - NS ED NURSE REASSESS COMMENT FT1
Port came in accessed from [facility], dressing is clean and intact, no irritation/redness at the sight. Port flushed with no difficulty or discomfort, blood return is present.

## 2022-08-26 NOTE — ED ADULT NURSE REASSESSMENT NOTE - NS ED NURSE REASSESS COMMENT FT1
MD Summers from admitting team at bedside, talking to pt about plan of care and consent form for platelets.

## 2022-08-26 NOTE — H&P ADULT - NS ATTEND AMEND GEN_ALL_CORE FT
Pt care and plan discussed and reviewed with PA. Plan as outlined above edited by me to reflect our discussion.   discussed with family and all consultants

## 2022-08-26 NOTE — CONSULT NOTE ADULT - ASSESSMENT
72 y.o M w/ hx of gallbladder cancer with metastasis who presents to hospital for hypotension.  On presentation patient found to have a creatinine of 1.25 BUN 53,  and hyponatremia of 127. SBP 70-60s on presentation, improved to to 80/60s with 500 cc of fluids. Patient was on 1g BID salt tabs and lasix 40 daily.

## 2022-08-26 NOTE — ED PROVIDER NOTE - CLINICAL SUMMARY MEDICAL DECISION MAKING FREE TEXT BOX
71 yo M pmh of gall bladder cancer presents with hypotension without evidence of infectious cause. Ascites on exam with bilateral pitting edema. Concern for SBP, UTI, pneumonia, ACS, PE, Will get CT abd pelvis to eval ascites, mets -labs, small bolus, antibiotics for sbp.

## 2022-08-26 NOTE — ED PROVIDER NOTE - OBJECTIVE STATEMENT
71 yo M PMHx of gallbladder cancer with mets to peritoneum, right pleura, liver, hyponatremia, ([facility] patient) presents to ED from [facility] with hypotension. Patient has no acute complaints, no cp, sob, fevers, chills, nausea, vomiting, abd pain. Feels tightness in belly from distension. WAs admitted for a thoracentesis recently and hyponatremia. Had  a similar episode of hypotension 8/19/22 which resolved after a bolus 500 cc.

## 2022-08-26 NOTE — CONSULT NOTE ADULT - SUBJECTIVE AND OBJECTIVE BOX
Reason for consult: Stage IV Ca Gallbladder    HPI: 72y male pmh IV gallbladder cancer with mets to the peritomeum, right pleura and liver, Hyponatremia, Pt was referred to ed for c/o low bp, PT c/o feeling tightness in abd. No fever, chills. cough. loc, lightheaded, nvdc, PE Eldery male looking mildy chroncially ill, heent normocephalic atraumatic neck supple chest decreased bs rt chest post,. abd distended. 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 Scott Valentin of [facility] s/p Gemcitabin (dose reduced) /Durvalumab LD 2022 presenting with hypotension 70's/60's,  noted to be hyponatremic (Na 127) and CHA with volume depletion. Patient received a bolus of NS of 500mL with improvement of BP to 80/60s.    Of note patient was recently admitted to [facility] for SOB s/p left thoracentesis and was noted to be hyponatremic on that admission and was DC'd on salt tabs 1g BID, 1L fluid restriction and Lasix 40 mg for LE edema.    PAST MEDICAL & SURGICAL HISTORY:      FAMILY HISTORY:      Alcohol Denied  Smoking: Nonsmoker  Drug Use: Denied  Marital Status:         Allergies    No Known Allergies    Intolerances        MEDICATIONS  (STANDING):    MEDICATIONS  (PRN):      ROS  No fever, sweats, chills  No epistaxis, HA, sore throat  No CP, SOB, cough, sputum  No n/v/d, abd pain, melena, hematochezia  No edema  No rash  No anxiety  No back pain, joint pain  No bleeding, bruising  No dysuria, hematuria    T(C): 36.4 (22 @ 11:58), Max: 36.7 (22 @ 11:38)  HR: 106 (22 @ 11:58) (106 - 108)  BP: 99/64 (22 @ 11:58) (96/67 - 99/64)  RR: 22 (22 @ 11:58) (18 - 22)  SpO2: 96% (22 @ 11:58) (95% - 96%)  Wt(kg): --    PE  NAD  Awake, alert  Anicteric, MMM  RRR  CTAB  Abd soft, NT, ND  Bilateral pedal edema  No rash grossly                            14.8   9.30  )-----------( x        ( 26 Aug 2022 13:06 )             46.2           127<L>  |  88<L>  |  53<H>  ----------------------------<  143<H>  4.9   |  26  |  1.25    Ca    8.9      26 Aug 2022 13:06    TPro  6.0  /  Alb  2.5<L>  /  TBili  0.7  /  DBili  x   /  AST  67<H>  /  ALT  63<H>  /  AlkPhos  537<H>      [facility] FOR CANCER AND ALLIED DISEASES   DEPARTMENT OF RADIOLOGY   MSK [address] [address] [phone]     DIAGNOSTIC RADIOLOGY   Report of Consultation   Name: ZUHAIR NOONAN Acc No: [id number]  MRN: [id number] Date of Service: 2022  : 1950 Sex: M Pt Loc: MSK   Ordered by: SCOTT VALENTIN MD Date of Report: 2022 11:19 AM  Procedure: XR CHEST PA - LAT Account: [id number]  PRID #: [id number]     FINAL REPORT   2022 PA and lateral chest   CLINICAL STATEMENT: Biliary/gallbladder cancer. Pleural effusion. Recent  thoracentesis.   COMPARISON: July 3, 2022   CORRELATION: CT chest, abdomen and pelvis 2022   FINDINGS:   LUNGS: Increased hazy opacities at the right  lung base, probable dependent atelectasis. Convex opacity along the  posterior chest wall seen only on the lateral view measuring approximately  6 cm in greatest dimension is suspicious for a loculated fluid collection  possibly pleural fluid, possibly hematoma.     PLEURA: Increased small bilateral pleural  effusions (right greater than left).   CARDIOMEDIASTINUM: Unremarkable.   OTHER: New right-sided port with catheter tip  in the right atrium.   Findings discussed with  at 11:13 AM on 2022.  IMPRESSION:   1. Since July 3, 2022, increased pleural effusions.   2. Increased probable atelectasis at the right lung base.   3. New opacity in the posterior thorax seen only on the lateral view,  possible loculated pleural fluid. Further evaluation with chest CT can be  obtained if clinically indicated.     FINAL REPORT   Reason for consult: Stage IV Ca Gallbladder    HPI: 72y male pmh IV gallbladder cancer with mets to the peritomeum, right pleura and liver, Hyponatremia, Pt was referred to ed for c/o low bp, PT c/o feeling tightness in abd. No fever, chills. cough. loc, lightheaded, nvdc, PE Eldery male looking mildy chroncially ill, heent normocephalic atraumatic neck supple chest decreased bs rt chest post,. abd distended. 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 Scott Valentin of [facility] s/p Gemcitabin (dose reduced) /Durvalumab LD 2022 presenting with hypotension 70's/60's,  noted to be hyponatremic (Na 127) and CHA with volume depletion. Patient received a bolus of NS of 500mL with improvement of BP to 80/60s.    Of note patient was recently admitted to [facility] for SOB s/p left thoracentesis and was noted to be hyponatremic on that admission and was DC'd on salt tabs 1g BID, 1L fluid restriction and Lasix 40 mg for LE edema.    PAST MEDICAL & SURGICAL HISTORY:      FAMILY HISTORY:      Alcohol Denied  Smoking: Nonsmoker  Drug Use: Denied  Marital Status:         Allergies    No Known Allergies    Intolerances        MEDICATIONS  (STANDING):    MEDICATIONS  (PRN):      ROS  No fever, sweats, chills  No epistaxis, HA, sore throat  No CP, SOB, cough, sputum  Abdominal pain and distension  Bilateral leg edema  No rash  No anxiety  No back pain, joint pain  No bleeding, bruising  No dysuria, hematuria    T(C): 36.4 (22 @ 11:58), Max: 36.7 (22 @ 11:38)  HR: 106 (22 @ 11:58) (106 - 108)  BP: 99/64 (22 @ 11:58) (96/67 - 99/64)  RR: 22 (22 @ 11:58) (18 - 22)  SpO2: 96% (22 @ 11:58) (95% - 96%)  Wt(kg): --    PE  NAD  Awake, alert  Anicteric, MMM  RRR  Poor inspiratory effort  Abdomen profoundly distended - appears to be ascites  Bilateral pedal edema  No rash grossly                            14.8   9.30  )-----------( x        ( 26 Aug 2022 13:06 )             46.2           127<L>  |  88<L>  |  53<H>  ----------------------------<  143<H>  4.9   |  26  |  1.25    Ca    8.9      26 Aug 2022 13:06    TPro  6.0  /  Alb  2.5<L>  /  TBili  0.7  /  DBili  x   /  AST  67<H>  /  ALT  63<H>  /  AlkPhos  537<H>      [facility] FOR CANCER AND ALLIED DISEASES   DEPARTMENT OF RADIOLOGY   MSK [address] [address] [phone]     DIAGNOSTIC RADIOLOGY   Report of Consultation   Name: ZUHAIR NOONAN Acc No: [id number]  MRN: [id number] Date of Service: 2022  : 1950 Sex: M Pt Loc: MSK   Ordered by: SCOTT VALENTIN MD Date of Report: 2022 11:19 AM  Procedure: XR CHEST PA - LAT Account: [id number]  PRID #: [id number]     FINAL REPORT   2022 PA and lateral chest   CLINICAL STATEMENT: Biliary/gallbladder cancer. Pleural effusion. Recent  thoracentesis.   COMPARISON: July 3, 2022   CORRELATION: CT chest, abdomen and pelvis 2022   FINDINGS:   LUNGS: Increased hazy opacities at the right  lung base, probable dependent atelectasis. Convex opacity along the  posterior chest wall seen only on the lateral view measuring approximately  6 cm in greatest dimension is suspicious for a loculated fluid collection  possibly pleural fluid, possibly hematoma.     PLEURA: Increased small bilateral pleural  effusions (right greater than left).   CARDIOMEDIASTINUM: Unremarkable.   OTHER: New right-sided port with catheter tip  in the right atrium.   Findings discussed with  at 11:13 AM on 2022.  IMPRESSION:   1. Since July 3, 2022, increased pleural effusions.   2. Increased probable atelectasis at the right lung base.   3. New opacity in the posterior thorax seen only on the lateral view,  possible loculated pleural fluid. Further evaluation with chest CT can be  obtained if clinically indicated.     FINAL REPORT    [facility] FOR CANCER AND ALLIED DISEASES   DEPARTMENT OF RADIOLOGY   MSK [address] [place], [place] [id number] [phone]     COMPUTED TOMOGRAPHY   Report of Consultation   Name: ZUHAIR NOONAN Acc No: [id number]  MRN: [id number] Date of Service: 2022  : 1950 Sex: M Pt Loc: MSK   Ordered by: SCOTT VALENTIN MD Date of Report: 2022 10:35 AM  Procedure: CT CH/ABD/PEL W/ CON Account: [id number]  PRID #: [id number]     FINAL REPORT   2022 CT of chest, abdomen, and pelvis   CLINICAL STATEMENT: Abdominal pain; Biliary/Gallbladder CA; (CS: CAT NA).  Extent of disease   TECHNIQUE:   * IV contrast: with contrast   * Oral Contrast: None   * Acquisition: Axial CT images of chest, abdomen and pelvis   * Postprocessing: routine     RADIATION DOSE (DLP): 1559 mGy-cm   COMPARISON: July 3, 2022, CT chest abdomen.   CORRELATION: None.   FINDINGS:   LUNGS/AIRWAYS: New right lower lobe  compressive atelectasis due to the adjacent new large pleural effusion.  New right middle lobe subsegmental atelectasis. Multiple bilateral  ill-defined punctate nodules and linear densities too small to accurately  characterize. These could be metastatic or infectious/inflammatory.    PLEURA/PERICARDIUM: Large right pleural effusion increased from prior  smaller pleural effusion. New small left pleural effusion.   MEDIASTINUM/THORACIC NODES: New PowerPort with tip in right atrium.  HEPATOBILIARY: Limited comparison as prior scan was scanned  in the arterial phase. A liver metastasis at the dome in segment 8  measures 4.2 x 3.4 cm increased from 2.3 x 1.4 cm. A metastasis in segment  7 at the dome measures 4.8 x 3.6 cm increased from 4.6 x 2.7 cm. Multiple  cysts and smaller low-attenuation lesions which are subcentimeter and too  small to accurately characterize but unchanged. Cholelithiasis is noted.    SPLEEN: Unremarkable.   PANCREAS: Ill-defined pancreatic tail mass is again  noted but there is motion artifact precluding measurement. There is no pancreatic ductal dilatation.   ADRENAL GLANDS: Unremarkable.   KIDNEYS: Unremarkable.   ABDOMINOPELVIC   NODES: Retroperitoneal adenopathy is  unchanged including peripancreatic measuring 2.7 x 1.8 cm, portacaval  measuring 2.7 x 1.8 cm, and retrocaval measuring 1.7 x 1.5 cm.  PELVIC ORGANS: Unremarkable.   PERITONEUM/   MESENTERY/BOWEL: There is increase in ascites. There is increase in  peritoneal carcinomatosis. No pelvic imaging was previously done however  there is a tumor implant in the pelvic cul-de-sac measuring 4.4 x 2.7 cm.  Diverticulosis coli without inflammation.     BONES/SOFT TISSUES: Left hip replacement. Left shoulder calcified loose  bodies are present.   OTHER: None.   IMPRESSION:   1. Since July 3, 2022, increased ascites and peritoneal carcinomatosis  2. Increased liver metastases   3. Unchanged abdominal adenopathy   4. Increased bilateral pleural effusions large on the right and small on  the left. New middle lobe and right lower lobe subsegmental compressive  atelectasis.   5. Unchanged nonspecific lung nodules   6. Cholelithiasis   7. No specific reason for abdominal pain noted.     FINAL REPORT

## 2022-08-26 NOTE — ED CLERICAL - NS ED CLERK NOTE PRE-ARRIVAL INFORMATION; ADDITIONAL PRE-ARRIVAL INFORMATION
CC/Reason For referral:  msk pt. hx gallbladder ca. nav and hyponatremia  Preferred Consultant(if applicable):  Who admits for you (if needed):  Do you have documents you would like to fax over?  Would you still like to speak to an ED attending? no call back necessary

## 2022-08-26 NOTE — ED PROVIDER NOTE - ATTENDING CONTRIBUTION TO CARE
Private Physician Hematology/Oncology [facility] Dr Valentin. Referred to Dr Edmondson  72y male pmh IV gallbladder cancer with mets to the peritomeum, right pleura and liver, Hyponatremia, Pt was referred to ed for c/o low bp, PT c/o feeling tightness in abd. No fever, chills. cough. loc, lightheaded, nvdc, PE Eldery male looking mildy chroncially ill, heent normocephalic atraumatic neck supple chest decreased bs rt chest post,. abd distended. w fluid wave, Neruo gcs 15 speech fluent power 5/5 all extre madhu pedal edema  Maksim Argueta MD, Facep

## 2022-08-26 NOTE — H&P ADULT - ASSESSMENT
Patient is a 72 year old Male with PMHx of gallbladder cancer with metastasis to the peritoneum, right pleura, liver, hyponatremia who presents to [facility] from outside laboratory for hypotension, CHA and hyponatremia. Patient admits to abdominal distention, and was recently admitted for a thoracentesis.  Patient denies CP, palpitations     Hyponatremia  - S/P 500 cc IVF bolus   - Na of 127 on admission labs  - Will hold off on further IVF pending renal recommendations in view of ascites   - Previously on Salt tablets, will hold off pending renal recs   - Avoid overcorrection of > 6-8mEq in 24 hours, monitor Na closely  - Repeat BMP at 6PM tonight to monitor Na  - Renal consulted; F/u recs    Hypotension  - S/P 500 cc IVF bolus at outside facility   - Continue to monitor BP, VS, HR and patient closely  - If further hypotension will consider trial of Midodrine at that time   - Fall precautions  - Check Echo     Ascites  - Abdominal ascites with prior tap at OSH  - F/u Abdominal US results  - IR consult placed in sunrise, however patient is thrombocytopenic, will transfuse 1 unit of platelets   - Pain control     Thrombocytopenia  - Platelets were reported at 27K at [facility]  8/26  - Likely due to Chemotherapy   - WIll check PT/PTT and Fibrinogen as per Heme/Onc  - Plan to transfuse platelets if <10K or <50K with bleeding or prior to procedures --> will transfuse 1 unit i anticipation of IR      Elevated troponin  - Likely stress induced in view of overload   - Will trend troponin tonight at 6PM  - Check echo    CAH  - Cr of 1.25, appears to have improved S/P IVF bolus    - Patient received CT w/ IV Contrast, monitor closely for YULIA   - Trend on daily labs     Stage IV Ca Gallbladder  - Under care by Dr. Tito Valentin of [facility]  - S/P Gemcitabine /Durvalumab (LD 8/19/2022)  - Heme/Onc follow up   - Outpatient [facility] follow up     PPX  - Hold off on pharm DVT PPX for now  - PPI          Patient is a 72 year old Male with PMHx of gallbladder cancer with metastasis to the peritoneum, right pleura, liver, hyponatremia who presents to [facility] from outside laboratory for hypotension, CHA and hyponatremia. Patient admits to abdominal distention, and was recently admitted for a thoracentesis.  Patient denies CP, palpitations     Hyponatremia  - S/P 500 cc IVF bolus   - Na of 127 on admission labs  - Will hold off on further IVF pending renal recommendations in view of ascites   - Previously on Salt tablets, will hold off pending renal recs   - Avoid overcorrection of > 6-8mEq in 24 hours, monitor Na closely  - Repeat BMP at 6PM tonight to monitor Na  - Renal consulted; F/u recs  - Diet with fluid restriction to 1 L per renal  - Salt tablets increased to 2g TID per renal  - Monitor BMP   - Hold Lasix in view of Hyponatremia, CHA and CT with contrast   - Checking urine & serum Na and osmolality     Hypotension  - S/P 500 cc IVF bolus at outside facility   - Continue to monitor BP, VS, HR and patient closely  - If further hypotension will consider trial of Midodrine at that time   - Fall precautions  - Check Echo     Ascites  - Abdominal ascites with prior tap at OSH  - F/u Abdominal US results  - IR consult placed in sunrise, however patient is thrombocytopenic, will transfuse 1 unit of platelets --> likely on Monday per IR  - Pain control   - C/w Rocephin for prophylaxis for now    Pulmonary Embolism  - Found on CT Chest with IV Contrast  - Likely cause of elevated troponin   - Unable to AC in view of thrombocytopenia  - Vascular cardiology consulted; F/u recs  - Check Echo, LE duplex to r/o DVT   - Monitor on Telemetry    Thrombocytopenia  - Platelets were reported at 27K at [facility] 08/26  - Likely due to Chemotherapy   - WIll check PT/PTT and Fibrinogen as per Heme/Onc  - Plan to transfuse platelets if <10K or <50K with bleeding or prior to procedures --> will transfuse 1 unit i anticipation of IR      Elevated troponin  - Likely due to pulmonary embolism   - Will trend troponin tonight at 6PM  - Check echo  - Vascular cardiology has been consulted     CHA  - Cr of 1.25, appears to have improved S/P IVF bolus    - Patient received CT w/ IV Contrast, monitor closely for YULIA   - Trend on daily labs     Pleural Effusion and LE edema  - Lasix on hold in view of CHA and CT with contrast  - C/w Fluid restriction   - Monitor closely  - Check Echo and Duplex    Stage IV Ca Gallbladder  - Under care by Dr. Tito Valentin of [facility]  - S/P Gemcitabine /Durvalumab (LD 8/19/2022)  - Heme/Onc follow up   - Outpatient [facility] follow up   - CT with diffuse metastasis     Medication management  - Patient and daughter unsure of medications/ dosages.  - Patient and daughter aware of Lasix 40 (being held), Salt tablets, and and gabapentin 200 BID  - Have asked Daughter to bring in updated list of medications, who states that she will     PPX  - Hold off on pharm DVT PPX for now  - PPI     Discussed with daughter and patient at length at the bedside.      Patient is a 72 year old Male with PMHx of gallbladder cancer with metastasis to the peritoneum, right pleura, liver, hyponatremia who presents to [facility] from outside laboratory for hypotension, CHA and hyponatremia. Patient admits to abdominal distention, and was recently admitted for a thoracentesis.  Patient denies CP, palpitations     Hyponatremia  - S/P 500 cc IVF bolus   - Na of 127 on admission labs  - Will hold off on further IVF pending renal recommendations in view of ascites   - Previously on Salt tablets, will hold off pending renal recs   - Avoid overcorrection of > 6-8mEq in 24 hours, monitor Na closely  - Repeat BMP at 6PM tonight to monitor Na  - Renal consulted; F/u recs  - Diet with fluid restriction to 1 L per renal  - Salt tablets increased to 2g TID per renal  - Monitor BMP   - Hold Lasix in view of Hyponatremia, CHA and CT with contrast   - Checking urine & serum Na and osmolality     Hypotension  - Likely due to volume depletion;  S/P 500 cc IVF bolus at outside facility   - Continue to monitor BP, VS, HR and patient closely  - If further hypotension will consider trial of Midodrine at that time   - Fall precautions  - Check Echo     Ascites  - Likely due to fluid overload in extravascular space   - F/u Abdominal US results  - IR consult placed in sunrise, however patient is thrombocytopenic, will transfuse 1 unit of platelets --> likely on Monday per IR  - Pain control   - C/w Rocephin for prophylaxis for now    Pulmonary Embolism  - Found on CT Chest with IV Contrast  - Likely cause of elevated troponin   - Unable to AC in view of thrombocytopenia  - Vascular cardiology consulted; F/u recs  - Check Echo, LE duplex to r/o DVT   - Monitor on Telemetry    Thrombocytopenia  - Platelets were reported at 27K at [facility] 08/26  - Likely due to Chemotherapy   - WIll check PT/PTT and Fibrinogen as per Heme/Onc  - Plan to transfuse platelets if <10K or <50K with bleeding or prior to procedures --> will transfuse 1 unit i anticipation of IR      Elevated troponin  - Likely due to pulmonary embolism   - Will trend troponin tonight at 6PM  - Check echo  - Vascular cardiology has been consulted     CHA  - Cr of 1.25, appears to have improved S/P IVF bolus    - Patient received CT w/ IV Contrast, monitor closely for YULIA   - Trend on daily labs     Pleural Effusion and LE edema  - Lasix on hold in view of CHA and CT with contrast  - C/w Fluid restriction   - Monitor closely  - Check Echo and Duplex    Stage IV Ca Gallbladder  - Under care by Dr. Tito Valentin of [facility]  - S/P Gemcitabine /Durvalumab (LD 8/19/2022)  - Heme/Onc follow up   - Outpatient [facility] follow up   - CT with diffuse metastasis     Medication management  - Patient and daughter unsure of medications/ dosages.  - Patient and daughter aware of Lasix 40 (being held), Salt tablets, and and gabapentin 200 BID  - Have asked Daughter to bring in updated list of medications, who states that she will     PPX  - Hold off on pharm DVT PPX for now  - PPI     Discussed with daughter and patient at length at the bedside.      Patient is a 72 year old Male with PMHx of gallbladder cancer with metastasis to the peritoneum, right pleura, liver, hyponatremia who presents to [facility] from outside laboratory for hypotension, CHA and hyponatremia. Patient admits to abdominal distention, and was recently admitted for a thoracentesis.  Patient denies CP, palpitations     Hyponatremia  - S/P 500 cc IVF bolus   - Na of 127 on admission labs  - Will hold off on further IVF pending renal recommendations in view of ascites   - likely SIADH due to underlying disease   - Previously on Salt tablets, will hold off pending renal recs   - Avoid overcorrection of > 6-8mEq in 24 hours, monitor Na closely  - Repeat BMP at 6PM tonight to monitor Na  - Renal consulted; F/u recs  - Diet with fluid restriction to 1 L per renal  - Salt tablets increased to 2g TID per renal  - Monitor BMP   - Hold Lasix in view of Hyponatremia, CHA and CT with contrast   - Checking urine & serum Na and osmolality     Hypotension  - Likely due to volume depletion;  S/P 500 cc IVF bolus at outside facility   - Continue to monitor BP, VS, HR and patient closely  - If further hypotension will consider trial of Midodrine at that time   - Fall precautions  - Check Echo     Ascites  - Likely due to fluid overload in extravascular space   - F/u Abdominal US results  - IR consult placed in sunrise, however patient is thrombocytopenic, will transfuse 1 unit of platelets --> likely on Monday per IR  - Pain control   - C/w Rocephin for prophylaxis for now    Pulmonary Embolism  - Found on CT Chest with IV Contrast  - Likely cause of elevated troponin   - Unable to AC in view of thrombocytopenia  - Vascular cardiology consulted; F/u recs  - Check Echo, LE duplex to r/o DVT   - Monitor on Telemetry    Thrombocytopenia  - Platelets were reported at 27K at [facility] 08/26  - Likely due to Chemotherapy   - WIll check PT/PTT and Fibrinogen as per Heme/Onc  - Plan to transfuse platelets if <10K or <50K with bleeding or prior to procedures --> will transfuse 1 unit i anticipation of IR      Elevated troponin  - Likely due to pulmonary embolism   - Will trend troponin tonight at 6PM  - Check echo  - Vascular cardiology has been consulted     CHA  - Cr of 1.25, appears to have improved S/P IVF bolus    - Patient received CT w/ IV Contrast, monitor closely for YULIA   - Trend on daily labs     Pleural Effusion and LE edema  - Lasix on hold in view of CHA and CT with contrast  - C/w Fluid restriction   - Monitor closely  - Check Echo and Duplex    Stage IV Ca Gallbladder  - Under care by Dr. Tito Valentin of [facility]  - S/P Gemcitabine /Durvalumab (LD 8/19/2022)  - Heme/Onc follow up   - Outpatient [facility] follow up   - CT with diffuse metastasis     Medication management  - Patient and daughter unsure of medications/ dosages.  - Patient and daughter aware of Lasix 40 (being held), Salt tablets, and and gabapentin 200 BID  - Have asked Daughter to bring in updated list of medications, who states that she will     PPX  - Hold off on pharm DVT PPX for now  - PPI     Discussed with daughter and patient at length at the bedside.

## 2022-08-26 NOTE — H&P ADULT - HISTORY OF PRESENT ILLNESS
Patient is a 72 year old Male with PMHx of gallbladder cancer with metastasis to the peritoneum, right pleura, liver, hyponatremia who presents to [facility] from outside laboratory for hypotension, CHA and hyponatremia. Patient admits to abdominal distention, and was recently admitted for a thoracentesis.  Patient denies CP, palpitations    Patient is a 72 year old Male with PMHx of gallbladder cancer with metastasis to the peritoneum, right pleura, liver, hyponatremia, HLD who presents to [facility] from outside laboratory for hypotension, CHA and hyponatremia. Patient admits to abdominal distention, and was recently admitted for a thoracentesis at OSH. History obtained from patient and daugther at the bedside. Patient denies CP, palpitations. Denies headache or lightheadedness.

## 2022-08-26 NOTE — ED ADULT NURSE REASSESSMENT NOTE - NS ED NURSE REASSESS COMMENT FT1
Bladder scan performed- 500 cc urine viewed. ED  ACP Rita made aware and states scan may not be accurate because pt has ascites. Pt and daughter made aware. Daughter an dpt want Odell. To be addressed by WARREN Salinas.

## 2022-08-26 NOTE — ED PROVIDER NOTE - NS ED ROS FT
GENERAL: no fever, no chills, no weight loss  EYES: no change in vision, no irritation, no discharge, no redness, no pain  HEENT: no trouble swallowing or speaking, no throat pain, no ear pain  CARDIAC: no chest pain, no palpitations   PULMONARY: no cough, no shortness of breath, no wheezing  GI: +abd distension, no abdominal pain, no nausea, no vomiting, no diarrhea, no constipation, no melena, no hematochezia, no hematemesis  : no changes in urination, no dysuria, no frequency, no hematuria, no discharge  SKIN: no rashes  NEURO: no headache, no numbness, no weakness  MSK: no joint pain, no muscle pain, no back pain, no calf pain

## 2022-08-26 NOTE — CONSULT NOTE ADULT - SUBJECTIVE AND OBJECTIVE BOX
Vascular Cardiology Consult Note    DIRECT SERVICE NUMBER:  [phone]           EMAIL [email or web address]   OFFICE [phone]    CC: PE    HPI:    73yo man with stage 4 gallbladder cancer with metastases (peritoneum, R lung pleura, liver), [place] [facility] chronic hypoNa, referred to ED for low blood pressure. In the ED initial vitals BP 96/67, , RR 18, SpO2 95% on RA. Workup in ED notable for HS trop 146 -> 134 and CT chest with incidental LLL segmental PE for which vascular cardiology is consulted.        Allergies  No Known Allergies    Intolerances    MEDICATIONS:  MEDICATIONS  (STANDING):  gabapentin 200 milliGRAM(s) Oral two times a day    MEDICATIONS  (PRN):  HYDROmorphone  Injectable 1 milliGRAM(s) IV Push every 4 hours PRN Severe Pain (7 - 10)      PAST MEDICAL & SURGICAL HISTORY:  Gallbladder cancer          FAMILY HISTORY:      SOCIAL HISTORY:  unchanged    REVIEW OF SYSTEMS:  CONSTITUTIONAL: No fever, weight loss, or fatigue  EYES: No eye pain, visual disturbances, or discharge  ENT:  No difficulty hearing, tinnitus, vertigo; No sinus or throat pain  NECK: No pain or stiffness  RESPIRATORY:    CARDIOVASCULAR:    GASTROINTESTINAL: No abdominal or epigastric pain. No nausea, vomiting, or hematemesis; No diarrhea or constipation. No melena or hematochezia.  GENITOURINARY: No dysuria, frequency, hematuria, or incontinence  NEUROLOGICAL: No headaches, memory loss, loss of strength, numbness, or tremors  SKIN:   LYMPH Nodes: No enlarged glands  ENDOCRINE: No heat or cold intolerance; No hair loss  MUSCULOSKELETAL: No joint pain or swelling; No muscle, back, or extremity pain  PSYCHIATRIC: No depression, anxiety, mood swings, or difficulty sleeping  HEME/LYMPH: No easy bruising, or bleeding gums  ALLERGY AND IMMUNOLOGIC: No hives or eczema	    [ x] All others negative	  [ ] Unable to obtain    PHYSICAL EXAM:  T(C): 36.4 (08-26-22 @ 11:58), Max: 36.7 (08-26-22 @ 11:38)  HR: 99 (08-26-22 @ 15:03) (99 - 108)  BP: 95/62 (08-26-22 @ 15:03) (95/62 - 99/64)  RR: 20 (08-26-22 @ 15:04) (18 - 22)  SpO2: 95% (08-26-22 @ 15:04) (92% - 96%)  Wt(kg): --  I&O's Summary      Appearance:  	  HEENT:   Normal oral mucosa, PERRL, EOMI	  Carotid:   Right:    Left:    Lymphatic: No lymphadenopathy  Cardiovascular:    Respiratory:  	  Psychiatry:  AAO x   Gastrointestinal:  Soft, Non-tender, + BS	  Skin: No rashes, No ecchymoses, No cyanosis	  Neurologic:    Extremities:      Vascular Pulse Exam:  Right DP: []palpable []non-palpable []audible      Left DP :   []palpable []non-palpable []audible  Right PT: []palpable [] non-palpable []audible   Left PT:  [] palpable [] non-palpable []audible         Foot Exam:        LABS:	 	    CBC Full  -  ( 26 Aug 2022 13:06 )  WBC Count : 9.30 K/uL  Hemoglobin : 14.8 g/dL  Hematocrit : 46.2 %  Platelet Count - Automated : 25 K/uL  Mean Cell Volume : 96.5 fl  Mean Cell Hemoglobin : 30.9 pg  Mean Cell Hemoglobin Concentration : 32.0 gm/dL  Auto Neutrophil # : 9.22 K/uL  Auto Lymphocyte # : 0.00 K/uL  Auto Monocyte # : 0.00 K/uL  Auto Eosinophil # : 0.00 K/uL  Auto Basophil # : 0.00 K/uL  Auto Neutrophil % : 99.1 %  Auto Lymphocyte % : 0.0 %  Auto Monocyte % : 0.0 %  Auto Eosinophil % : 0.0 %  Auto Basophil % : 0.0 %    08-26    127<L>  |  88<L>  |  53<H>  ----------------------------<  143<H>  4.9   |  26  |  1.25    Ca    8.9      26 Aug 2022 13:06    TPro  6.0  /  Alb  2.5<L>  /  TBili  0.7  /  DBili  x   /  AST  67<H>  /  ALT  63<H>  /  AlkPhos  537<H>  08-26     Vascular Cardiology Consult Note    DIRECT SERVICE NUMBER:  [phone]           EMAIL [email or web address]   OFFICE [phone]    CC: PE    HPI:    71yo man with stage 4 gallbladder cancer with metastases (peritoneum, R lung pleura, liver), prior abdominal surgeries, follows at [facility] for [place] care, chronic hypoNa, referred to ED for low blood pressure. In the ED initial vitals BP 96/67, , RR 18, SpO2 95% on RA. Workup in ED notable for HS trop 146 -> 134, Plt 25, and CT chest with incidental LLL segmental PE for which vascular cardiology is consulted.       Allergies  No Known Allergies    Intolerances    MEDICATIONS:  MEDICATIONS  (STANDING):  gabapentin 200 milliGRAM(s) Oral two times a day    MEDICATIONS  (PRN):  HYDROmorphone  Injectable 1 milliGRAM(s) IV Push every 4 hours PRN Severe Pain (7 - 10)      PAST MEDICAL & SURGICAL HISTORY:  Gallbladder cancer    FAMILY HISTORY:  No known family history of clotting disorder    SOCIAL HISTORY:    Denies toxic habits    REVIEW OF SYSTEMS:  CONSTITUTIONAL: No fever, weight loss, or fatigue  EYES: No eye pain, visual disturbances, or discharge  ENT:  No difficulty hearing, tinnitus, vertigo; No sinus or throat pain  NECK: No pain or stiffness  RESPIRATORY:  No SOB, cough, or hemoptysis  CARDIOVASCULAR:  +LE edema. No CP, orthopnea, PND, palpitations  GASTROINTESTINAL: +Abdominal distention. No abdominal  pain. No nausea, vomiting, or hematemesis; No diarrhea or constipation. No melena or hematochezia.  GENITOURINARY: No dysuria, frequency, hematuria, or incontinence  NEUROLOGICAL: No headaches, memory loss, loss of strength, numbness, or tremors  ENDOCRINE: No heat or cold intolerance; No hair loss  MUSCULOSKELETAL: No joint pain or swelling; No muscle, back, or extremity pain  PSYCHIATRIC: No depression, anxiety, mood swings, or difficulty sleeping  HEME/LYMPH: No easy bruising, or bleeding gums  ALLERGY AND IMMUNOLOGIC: No hives or eczema	    PHYSICAL EXAM:  T(C): 36.4 (08-26-22 @ 11:58), Max: 36.7 (08-26-22 @ 11:38)  HR: 99 (08-26-22 @ 15:03) (99 - 108)  BP: 95/62 (08-26-22 @ 15:03) (95/62 - 99/64)  RR: 20 (08-26-22 @ 15:04) (18 - 22)  SpO2: 95% (08-26-22 @ 15:04) (92% - 96%)  Wt(kg): --  I&O's Summary    Appearance: Chronically ill appearing man, bitemporal wasting	  HEENT:   Normal oral mucosa, PERRL, EOMI	  Cardiovascular: RRR, no M/R/G  Respiratory: Bibasilar crackles  Psychiatry:  AAO x 3  Gastrointestinal: Distended abdomen, +Fluid wave, laparoscopic surgical scars on abdomen, milt TTP at RUQ  Skin: No rashes, No ecchymoses, No cyanosis	  Neurologic: Grossly nonfocal  Extremities: 2+ pitting edema to knees, L>R    LABS:	 	    CBC Full  -  ( 26 Aug 2022 13:06 )  WBC Count : 9.30 K/uL  Hemoglobin : 14.8 g/dL  Hematocrit : 46.2 %  Platelet Count - Automated : 25 K/uL  Mean Cell Volume : 96.5 fl  Mean Cell Hemoglobin : 30.9 pg  Mean Cell Hemoglobin Concentration : 32.0 gm/dL  Auto Neutrophil # : 9.22 K/uL  Auto Lymphocyte # : 0.00 K/uL  Auto Monocyte # : 0.00 K/uL  Auto Eosinophil # : 0.00 K/uL  Auto Basophil # : 0.00 K/uL  Auto Neutrophil % : 99.1 %  Auto Lymphocyte % : 0.0 %  Auto Monocyte % : 0.0 %  Auto Eosinophil % : 0.0 %  Auto Basophil % : 0.0 %    08-26    127<L>  |  88<L>  |  53<H>  ----------------------------<  143<H>  4.9   |  26  |  1.25    Ca    8.9      26 Aug 2022 13:06    TPro  6.0  /  Alb  2.5<L>  /  TBili  0.7  /  DBili  x   /  AST  67<H>  /  ALT  63<H>  /  AlkPhos  537<H>  08-26 8/26/22 CT Chest/Abdomen/Pelvis  IMPRESSION:    CHEST:  *  Pulmonary embolism within left lower lobar and segmental pulmonary   arteries.  *  Moderate right and small left pleural effusions.  *  Scattered nonspecific subcentimeter pulmonary nodules. Irregular   interlobular septal thickening in the right lung, possibly representing   lymphangitic spread of disease.  *  Enlarged heterogeneous thyroid gland with a mixed density leftthyroid   lobe nodule measuring 2.4 cm.    ABDOMEN AND PELVIS:  *  Scattered indeterminate liver lesions, presumably the patient's known   metastatic disease.  *  Findings consistent with known peritoneal carcinomatosis. Moderate to   large volume ascites.  *  Ill-defined lesion along the pancreatic tail, possibly reflecting   metastatic disease, with a primary pancreatic neoplasm not excluded.  *  Ovoid thickening of the left ventral abdominal musculature measuring   2.3 x 3.3 x 3.7 cm, potentially representing an implant, or possibly an   intramuscular hematoma.  *  Linear metallic density in the gastric fundus, possibly an endoclip.   Correlate with patient history.

## 2022-08-27 NOTE — CONSULT NOTE ADULT - PROBLEM SELECTOR RECOMMENDATION 3
Stage IV  Follows with Dr. Valentin   Pending input from oncology to determine if pt would be eligible for further DMT to further discuss GOC
Likely in the setting of PE, pleural effusions, abdominal ascites  -Keep O>I as tolerated  -Keep sats >90% with supplemental O2 PRN (currently 4LNC). Pt not noted to be hypoxic on admission, wean O2 as tolerated  -VBG with no Co2 retention.

## 2022-08-27 NOTE — CHART NOTE - NSCHARTNOTEFT_GEN_A_CORE
ZUHAIR NOONAN  72y Male    Pt s/p RRT for persistent hypotension. Pt seen and evaluated. Pt with abd pain and generalized pain, shortness of breath. Pt A & O X 3 and no other acute distress     PAST MEDICAL & SURGICAL HISTORY:  Gallbladder cancer        Vital Signs Last 24 Hrs  T(C): 36.4 (27 Aug 2022 00:55), Max: 36.7 (26 Aug 2022 11:38)  T(F): 97.6 (27 Aug 2022 00:55), Max: 98.1 (26 Aug 2022 11:38)  HR: 114 (27 Aug 2022 00:55) (99 - 114)  BP: 90/67 (27 Aug 2022 00:55) (74/60 - 99/64)  BP(mean): 75 (26 Aug 2022 20:34) (68 - 75)  RR: 20 (27 Aug 2022 00:55) (13 - 23)  SpO2: 94% (27 Aug 2022 00:55) (92% - 99%)    Parameters below as of 27 Aug 2022 00:55  Patient On (Oxygen Delivery Method): nasal cannula  O2 Flow (L/min): 5      Event Summary:      72 year old Male with PMHx of gallbladder cancer with metastasis to the peritoneum, right pleura, liver, hyponatremia who presents to [facility] from outside laboratory for hypotension, CHA and hyponatremia. Patient admits to abdominal distention, and was recently admitted for a thoracentesis. Now admitted with new PE, hyponatremia, hypotension, ascites and pleural effusion. Pt currently s/p RRT for hypotension    Hypotension  - Pt volume overloaded, cannot tolerate volume  -Pt on Midodrine 10 mg 3X day, given extra midodrine 10 X 1, but SBP still in 80's, RRT called  - Pt maintaining MAP above 65, Will continue to monitor BP, if MAP drops below 65, will call MICU.   - Pt A & O X 3, mentating well  - Continue Midodrine 10 3X day, will consider increasing dose if hypotension persists   F/U with primary team in AM.    Rita Haile NP  [id number]

## 2022-08-27 NOTE — PROGRESS NOTE ADULT - SUBJECTIVE AND OBJECTIVE BOX
Pt seen, had RRT last night for hypotension, on midocrine and suspected due to ascites and fluid imbalance. Pt lethargic at time of my exam. Opens eyes to name after a few times but falls asleep again    MEDICATIONS  (STANDING):  albumin human 25% IVPB 100 milliLiter(s) IV Intermittent every 12 hours  cefTRIAXone   IVPB 2000 milliGRAM(s) IV Intermittent every 24 hours  chlorhexidine 2% Cloths 1 Application(s) Topical daily  gabapentin 200 milliGRAM(s) Oral two times a day  midodrine. 20 milliGRAM(s) Oral three times a day  pantoprazole    Tablet 40 milliGRAM(s) Oral before breakfast  senna 2 Tablet(s) Oral at bedtime  urea Oral Powder 30 Gram(s) Oral two times a day    MEDICATIONS  (PRN):  HYDROmorphone  Injectable 1 milliGRAM(s) IV Push every 3 hours PRN Severe Pain (7 - 10)  oxyCODONE    IR 10 milliGRAM(s) Oral every 4 hours PRN Moderate Pain (4 - 6)  polyethylene glycol 3350 17 Gram(s) Oral daily PRN Constipation      ROS  UTO 2/2 lethargy    Vital Signs Last 24 Hrs  T(C): 36.4 (27 Aug 2022 15:54), Max: 36.5 (26 Aug 2022 22:53)  T(F): 97.5 (27 Aug 2022 15:54), Max: 97.7 (26 Aug 2022 22:53)  HR: 98 (27 Aug 2022 15:54) (98 - 114)  BP: 100/71 (27 Aug 2022 15:54) (74/60 - 100/71)  BP(mean): 75 (26 Aug 2022 20:34) (68 - 75)  RR: 18 (27 Aug 2022 15:54) (13 - 23)  SpO2: 98% (27 Aug 2022 15:54) (94% - 99%)    Parameters below as of 27 Aug 2022 15:54  Patient On (Oxygen Delivery Method): nasal cannula  O2 Flow (L/min): 3.5      PE  NAD  lethargic  as above  ascites/abd distended  limited 2/2 participation                        14.1   10.48 )-----------( 33       ( 27 Aug 2022 07:20 )             43.3       08-27    127<L>  |  90<L>  |  61<H>  ----------------------------<  131<H>  5.3   |  23  |  1.70<H>    Ca    8.5      27 Aug 2022 07:09    TPro  6.0  /  Alb  2.5<L>  /  TBili  0.7  /  DBili  x   /  AST  67<H>  /  ALT  63<H>  /  AlkPhos  537<H>  08-26

## 2022-08-27 NOTE — CONSULT NOTE ADULT - SUBJECTIVE AND OBJECTIVE BOX
Chief Complaint:  Patient is a 72y old  Male who presents with a chief complaint of Hyponatremia and CHA (27 Aug 2022 12:20)      Date of service: 22 @ 15:48    HPI: The patient is a 72 year old Male with PMHx of gallbladder cancer with metastasis to the peritoneum, right pleura and liver, admitted with hypotension, hyponatremia and CHA. Patient admits to abdominal distention, and was recently admitted for a thoracentesis at OSH.     The patient denies dysphagia, nausea and vomiting, abdominal pain, diarrhea, change in bowel habits or NSAID use.      Allergies:  No Known Allergies      Home Medications:    Hospital Medications:  albumin human 25% IVPB 100 milliLiter(s) IV Intermittent every 12 hours  cefTRIAXone   IVPB 2000 milliGRAM(s) IV Intermittent every 24 hours  chlorhexidine 2% Cloths 1 Application(s) Topical daily  gabapentin 200 milliGRAM(s) Oral two times a day  HYDROmorphone  Injectable 1 milliGRAM(s) IV Push every 3 hours PRN  midodrine. 20 milliGRAM(s) Oral three times a day  oxyCODONE    IR 10 milliGRAM(s) Oral every 4 hours PRN  pantoprazole    Tablet 40 milliGRAM(s) Oral before breakfast  polyethylene glycol 3350 17 Gram(s) Oral daily PRN  senna 2 Tablet(s) Oral at bedtime  urea Oral Powder 30 Gram(s) Oral two times a day      PMHX/PSHX:  Gallbladder cancer        Family history:      Social History:   Denies ethanol use.  Denies illicit drug use.    ROS:     General:  No wt loss, fevers, chills, night sweats, fatigue,   Eyes:  Good vision, no reported pain  ENT:  No sore throat, pain, runny nose, dysphagia  CV:  No pain, palpitations, hypo/hypertension  Resp:  No dyspnea, cough, tachypnea, wheezing  GI:  See HPI  :  No pain, bleeding, incontinence, nocturia  Muscle:  No pain, weakness  Neuro:  No weakness, tingling, memory problems  Psych:  No fatigue, insomnia, mood problems, depression  Endocrine:  No polyuria, polydipsia, cold/heat intolerance  Heme:  No petechiae, ecchymosis, easy bruisability  Integumentary:  No rash, edema      PHYSICAL EXAM:     GENERAL:  Appears stated age, well-groomed, well-nourished, no distress  HEENT:  NC/AT,  conjunctivae anicteric, clear and pink,   NECK: supple, trachea midline  CHEST:  Full & symmetric excursion, no increased effort, breath sounds clear  HEART:  Regular rhythm, no JVD  ABDOMEN:  Soft, non-tender, non-distended, moderate ascites  EXTREMITIES:  no cyanosis,clubbing or edema  SKIN:  No rash, erythema, or, ecchymoses, no jaundice  NEURO:  Alert, non-focal, no asterixis  PSYCH: Appropriate affect, oriented to place and time  RECTAL: Deferred      Vital Signs:  Vital Signs Last 24 Hrs  T(C): 36.5 (27 Aug 2022 12:17), Max: 36.5 (26 Aug 2022 22:53)  T(F): 97.7 (27 Aug 2022 12:17), Max: 97.7 (26 Aug 2022 22:53)  HR: 99 (27 Aug 2022 12:17) (99 - 114)  BP: 97/49 (27 Aug 2022 12:17) (74/60 - 98/53)  BP(mean): 75 (26 Aug 2022 20:34) (68 - 75)  RR: 18 (27 Aug 2022 12:17) (13 - 23)  SpO2: 99% (27 Aug 2022 12:17) (94% - 99%)    Parameters below as of 27 Aug 2022 12:17  Patient On (Oxygen Delivery Method): nasal cannula  O2 Flow (L/min): 5    Daily     Daily Weight in k (27 Aug 2022 07:44)    LABS: Labs personally reviewed by me:                        14.1   10.48 )-----------( 33       ( 27 Aug 2022 07:20 )             43.3     08    127<L>  |  90<L>  |  61<H>  ----------------------------<  131<H>  5.3   |  23  |  1.70<H>    Ca    8.5      27 Aug 2022 07:09    TPro  6.0  /  Alb  2.5<L>  /  TBili  0.7  /  DBili  x   /  AST  67<H>  /  ALT  63<H>  /  AlkPhos  537<H>  08    LIVER FUNCTIONS - ( 26 Aug 2022 13:06 )  Alb: 2.5 g/dL / Pro: 6.0 g/dL / ALK PHOS: 537 U/L / ALT: 63 U/L / AST: 67 U/L / GGT: x           PT/INR - ( 27 Aug 2022 07:18 )   PT: 15.2 sec;   INR: 1.31 ratio         PTT - ( 27 Aug 2022 07:18 )  PTT:23.5 sec  Urinalysis Basic - ( 27 Aug 2022 07:20 )    Color: Yellow / Appearance: Slightly Turbid / S.038 / pH: x  Gluc: x / Ketone: Negative  / Bili: Negative / Urobili: Negative   Blood: x / Protein: 30 mg/dL / Nitrite: Negative   Leuk Esterase: Negative / RBC: 5 /hpf / WBC 15 /HPF   Sq Epi: x / Non Sq Epi: 14 /hpf / Bacteria: Negative          Imaging personally reviewed by me:

## 2022-08-27 NOTE — CONSULT NOTE ADULT - ASSESSMENT
72 year old Male with PMHx of gallbladder cancer with metastasis to the peritoneum, right pleura, liver, hyponatremia, HLD who presents to [facility] from outside laboratory for hypotension, CHA and hyponatremia. Palliative Care consulted for complex symptom management in the setting of advanced illness.

## 2022-08-27 NOTE — CONSULT NOTE ADULT - PROBLEM SELECTOR RECOMMENDATION 4
Pt is full code  Given stage IV malignancy CPR and intubation would likely impose more burden/harm than benefit, at the time pt still with active symptoms, pending oncology input, ongoing GOC  Page for uncontrolled symptoms [id number]
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 Valentin at [facility]  -S/p Gemcitabine/Durvalumab 8/19/2022 per heme/onc  -CT C/A/P with scattered lung nodules with concern for lymphangitic spread, scattered liver & pancreatic lesions, peritoneal carcinomatosis   -Heme/onc f/u.

## 2022-08-27 NOTE — CONSULT NOTE ADULT - PROBLEM SELECTOR RECOMMENDATION 9
Left lower lobar and segmental PE - incidentally found on CT C/A/P with IV contrast  -Likely provoked in the setting of malignancy   -Troponins elevated. Check proBNP  -Off full dose AC in the setting of thrombocytopenia. CT also with concern for L intramuscular hematoma  -F/u LE duplex  -F/u TTE  -Vascular cardiology f/u  -Heme/onc f/u.

## 2022-08-27 NOTE — CONSULT NOTE ADULT - SUBJECTIVE AND OBJECTIVE BOX
HPI:  Patient is a 72 year old Male with PMHx of gallbladder cancer with metastasis to the peritoneum, right pleura, liver, hyponatremia, HLD who presents to [facility] from outside laboratory for hypotension, CHA and hyponatremia. Patient admits to abdominal distention, and was recently admitted for a thoracentesis at OSH. History obtained from patient and daugther at the bedside. Patient denies CP, palpitations. Denies headache or lightheadedness.     (26 Aug 2022 15:22)    PERTINENT PM/SXH:   Gallbladder cancer    FAMILY HISTORY:    Family Hx substance abuse [ ]yes [ ]no  ITEMS NOT CHECKED ARE NOT PRESENT    SOCIAL HISTORY:   Significant other/partner[x ]  Children[x ]  Advent/Spirituality:  Substance hx:  [ ]   Tobacco hx:  [ ]   Alcohol hx: [ ]   Home Opioid hx:  [x ] I-Stop Reference No: [id number]  Living Situation: [ x]Home  [ ]Long term care  [ ]Rehab [ ]Other    ADVANCE DIRECTIVES:    DNR/MOLST  [ ]  Living Will  [ ]   DECISION MAKER(s):  [ ] Health Care Proxy(s)  [x ] Surrogate(s)  [ ] Guardian           Name(s): Phone Number(s):  Wife    BASELINE (I)ADL(s) (prior to admission):  [place]: [x ]Total  [ ] Moderate [ ]Dependent    Allergies    No Known Allergies    Intolerances    MEDICATIONS  (STANDING):  cefTRIAXone   IVPB 2000 milliGRAM(s) IV Intermittent every 24 hours  chlorhexidine 2% Cloths 1 Application(s) Topical daily  gabapentin 200 milliGRAM(s) Oral two times a day  midodrine. 20 milliGRAM(s) Oral three times a day  urea Oral Powder 30 Gram(s) Oral two times a day    MEDICATIONS  (PRN):  HYDROmorphone  Injectable 1 milliGRAM(s) IV Push every 4 hours PRN Severe Pain (7 - 10)    PRESENT SYMPTOMS: [ ]Unable to self-report  [ ] CPOT [ ] PAINADs [ ] RDOS  Source if other than patient:  [ ]Family   [ ]Team     Pain: [x ]yes [ ]no  QOL impact - unable to rest due to pain  Location - epigastric                   Aggravating factors -  Quality - aching  Radiation - to back  Timing- constant, now getting better more intermittent  Severity (0-10 scale): 5/10  Minimal acceptable level (0-10 scale): 2-3/10    CPOT:    https://www.[facility].org/getattachment/[email or web address]-[email or web address]/Critical-Care-Pain-Observation-Tool-(CPOT)    PAIN AD Score:   http://geriatrictoolkit.[facility]/cog/painad.pdf (press ctrl +  left click to view)    Dyspnea:                           [ ]Mild [ ]Moderate [ ]Severe      RDOS:  0 to 2  minimal or no respiratory distress   3  mild distress  4 to 6 moderate distress  >7 severe distress  https://homecareinformation.net/handouts/hen/Respiratory_Distress_Observation_Scale.pdf (Ctrl +  left click to view)     Anxiety:                             [ ]Mild [ ]Moderate [ ]Severe  Fatigue:                             [ ]Mild [ ]Moderate [ ]Severe  Nausea:                             [ ]Mild [ ]Moderate [ ]Severe  Loss of appetite:              [ ]Mild [ ]Moderate [ ]Severe  Constipation:                    [ ]Mild [ ]Moderate [ ]Severe    PCSSQ[Palliative Care Spiritual Screening Question]   Severity (0-10):  Score of 4 or > indicate consideration of Chaplaincy referral.    Chaplaincy Referral: [ ] yes [ ] refused [ ] following    Other Symptoms:  [x ]All other review of systems negative     Palliative Performance Status Version 2:   50  %    http://npcrc.org/files/news/palliative_performance_scale_ppsv2.pdf    PHYSICAL EXAM:  Vital Signs Last 24 Hrs  T(C): 36.5 (27 Aug 2022 12:17), Max: 36.5 (26 Aug 2022 22:53)  T(F): 97.7 (27 Aug 2022 12:17), Max: 97.7 (26 Aug 2022 22:53)  HR: 99 (27 Aug 2022 12:17) (99 - 114)  BP: 97/49 (27 Aug 2022 12:17) (74/60 - 98/53)  BP(mean): 75 (26 Aug 2022 20:34) (68 - 75)  RR: 18 (27 Aug 2022 12:17) (13 - 23)  SpO2: 99% (27 Aug 2022 12:17) (92% - 99%)    Parameters below as of 27 Aug 2022 12:17  Patient On (Oxygen Delivery Method): nasal cannula  O2 Flow (L/min): 5   I&O's Summary    26 Aug 2022 07:01  -  27 Aug 2022 07:00  --------------------------------------------------------  IN: 0 mL / OUT: 225 mL / NET: -225 mL      GENERAL: [ ]Cachexia    [ x]Alert  [x ]Oriented x 3  [ ]Lethargic  [ ]Unarousable  [ x]Verbal  [ ]Non-Verbal  Behavioral:   [ ] Anxiety  [ ] Delirium [ ] Agitation [ ] Other  HEENT:  [ ]Normal   [x ]Dry mouth   [ ]ET Tube/Trach  [ ]Oral lesions  PULMONARY:   [ ]Clear [x ]Tachypnea: denies dyspnea  [ ]Audible excessive secretions   [ x]Rhonchi        [ ]Right [ ]Left [ ]Bilateral  [ ]Crackles        [ ]Right [ ]Left [ ]Bilateral  [ ]Wheezing     [ ]Right [ ]Left [ ]Bilateral  [ ]Diminished breath sounds [ ]right [ ]left [ ]bilateral  CARDIOVASCULAR:    [x ]Regular [ ]Irregular [ ]Tachy  [ ]Gordon [ ]Murmur [ ]Other  GASTROINTESTINAL:  [ ]Soft  [x ]Distended   [ ]+BS  [ ]Non tender [x ]Tender  [ ]Other [ ]PEG [ ]OGT/ NGT  Last BM:   GENITOURINARY:  [x ]Normal [ ] Incontinent   [ ]Oliguria/Anuria   [ ]Odell  MUSCULOSKELETAL:   [ ]Normal   [x ]Weakness  [ ]Bed/Wheelchair bound [ ]Edema  NEUROLOGIC:   [x ]No focal deficits  [ ]Cognitive impairment  [ ]Dysphagia [ ]Dysarthria [ ]Paresis [ ]Other   SKIN:   [x ]Normal  [ ]Rash  [ ]Other  [ ]Pressure ulcer(s)       Present on admission [ ]y [ ]n    CRITICAL CARE:  [ ] Shock Present  [ ]Septic [ ]Cardiogenic [ ]Neurologic [ ]Hypovolemic  [ ]  Vasopressors [ ]  Inotropes   [ ]Respiratory failure present [ ]Mechanical ventilation [ ]Non-invasive ventilatory support [ ]High flow    [ ]Acute  [ ]Chronic [ ]Hypoxic  [ ]Hypercarbic [ ]Other  [ ]Other organ failure     LABS:                        14.1   10.48 )-----------( 33       ( 27 Aug 2022 07:20 )             43.3       127<L>  |  90<L>  |  61<H>  ----------------------------<  131<H>  5.3   |  23  |  1.70<H>    Ca    8.5      27 Aug 2022 07:09    TPro  6.0  /  Alb  2.5<L>  /  TBili  0.7  /  DBili  x   /  AST  67<H>  /  ALT  63<H>  /  AlkPhos  537<H>    PT/INR - ( 27 Aug 2022 07:18 )   PT: 15.2 sec;   INR: 1.31 ratio       PTT - ( 27 Aug 2022 07:18 )  PTT:23.5 sec    Urinalysis Basic - ( 27 Aug 2022 07:20 )    Color: Yellow / Appearance: Slightly Turbid / S.038 / pH: x  Gluc: x / Ketone: Negative  / Bili: Negative / Urobili: Negative   Blood: x / Protein: 30 mg/dL / Nitrite: Negative   Leuk Esterase: Negative / RBC: 5 /hpf / WBC 15 /HPF   Sq Epi: x / Non Sq Epi: 14 /hpf / Bacteria: Negative      RADIOLOGY & ADDITIONAL STUDIES:  < from: CT Abdomen and Pelvis w/ IV Cont (22 @ 14:43) >  IMPRESSION:  CHEST:  *  Pulmonary embolism within left lower lobar and segmental pulmonary   arteries.  *  Moderate right and small left pleural effusions.  *  Scattered nonspecific subcentimeter pulmonary nodules. Irregular   interlobular septal thickening in the right lung, possibly representing   lymphangitic spread of disease.  *  Enlarged heterogeneous thyroid gland with a mixed density leftthyroid   lobe nodule measuring 2.4 cm.  ABDOMEN AND PELVIS:  *  Scattered indeterminate liver lesions, presumably the patient's known   metastatic disease.  *  Findings consistent with known peritoneal carcinomatosis. Moderate to   large volume ascites.  *  Ill-defined lesion along the pancreatic tail, possibly reflecting   metastatic disease, with a primary pancreatic neoplasm not excluded.  *  Ovoid thickening of the left ventral abdominal musculature measuring   2.3 x 3.3 x 3.7 cm, potentially representing an implant, or possibly an   intramuscular hematoma.  *  Linear metallic density in the gastric fundus, possibly an endoclip.   Correlate with patient history.    PROTEIN CALORIE MALNUTRITION PRESENT: [ ]mild [ ]moderate [ ]severe [ ]underweight [ ]morbid obesity  https://www.andeal.org/vault/2440/web/files/ONC/Table_Clinical%20Characteristics%20to%20Document%20Malnutrition-White%20JV%20et%20al%2020.pdf    Height (cm): 182.9 (22 @ 11:38)  Weight (kg): 110 (22 @ 08:27)  BMI (kg/m2): 32.9 (22 @ 08:27)    [ ]PPSV2 < or = to 30% [ ]significant weight loss  [ ]poor nutritional intake  [ ]anasarca[ ]Artificial Nutrition      Other REFERRALS:  [ ]Hospice  [ ]Child Life  [ ]Social Work  [ ]Case management [ ]Holistic Therapy     Goals of Care Document:

## 2022-08-27 NOTE — CONSULT NOTE ADULT - PROBLEM SELECTOR RECOMMENDATION 9
Improved since yesterday  Pt indicates that compared to yesterday pain is better  Home regimen: Oxycodone 10mg q4hrs PRN for breakthrough pain prescribed by oncologist  Feels like pain has worsened over the last couple of dys  Would recommend:  - Continue with Dilaudid 1mg IV q4hrs for severe pain, can increase frequency to q3hrs PRN  - Add Oxycodone 10mg PO q4hrs PRN for moderate pain   - Add bowel regimen - senna qhs and Miralax PRN  - Would recommend para to improve symptoms

## 2022-08-27 NOTE — CONSULT NOTE ADULT - ASSESSMENT
71 y/o M with PMH of metastatic gallbladder CA (with mets to peritoneum, pleura, liver), hyponatremia, HLD. Presents to ED from [facility] for hypotension (reported SBP 70s), CHA, hyponatremia. Also admits to abdominal distention. Noted to have recent hospital admission at [facility] (3 weeks ago per patient, s/p L thoracentesis). CT C/A/P with moderate R and small L pl effusions, scattered pulm nodules with concern for lymphangitic spread, scattered liver & pancreatic lesions, peritoneal carcinomatosis with moderate to large volume ascites Also with incidental finding of L lower lobar and segmental PE. Pulmonary called to consult for pleural effusions, PE.

## 2022-08-27 NOTE — CHART NOTE - NSCHARTNOTEFT_GEN_A_CORE
See Consult note from 8/26  Pt has metastatic gallbladder Ca  Hypotension overnight  CHA hyponatremia  Recommend to increase midodrine  Give Albumin IV BID  Urena for hyponatremia  Fluid restrict 1 liter  Odell to monitor urine output  Paracentesis per primary team  GOC per onc team    Tahira Juarez MD  Off: [phone]  contact me on teams    (After 5 pm or on weekends please page the on-call fellow/attending, can check [email or web address] for schedule. Login is good william, schedule under [facility] medicine, psych, derm)

## 2022-08-27 NOTE — CONSULT NOTE ADULT - ASSESSMENT
72 year old man with metastatic Gb cancer and malignant ascites, admitted with hypotension, hyponatremia and CHA.     1) ascites   - tentatively planned for VIR guided paracentesis on Monday, once plts > 30K  - pending [facility] discussion would consider a long-term peritoneal catheter, ie. Aspira drain    2) metastatis GB cancer  - per oncology     3) PE  unable to anticoagulate given thrombocytopenia     4) CHA  - nephrology on board      I had a prolonged conversation with the patient regarding the hospital course, differential diagnosis, results of diagnostic tests this far, and therapeutic modalities available. Plan of care discussed with the patient after the evaluation. Patient expresses a clear understanding of the plan of care. Sixty five minutes spent on the total encounter, of which more than fifty percent of the encounter was spent on counseling and/or coordinating care by the attending physician.    Advanced care planning forms were discussed. Code status including forceful chest compressions, defibrillation and intubation were discussed. The risks benefits and alternatives to pertinent gastrointestinal procedures and interventions were discussed in detail and all questions were answered. Duration: 15 Minutes.    [facility]  Caty Segundo M.D  Gastroenterology and Hepatology  [address], [place]  Office: [phone]  Cell: [phone]

## 2022-08-27 NOTE — PROGRESS NOTE ADULT - ASSESSMENT
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 Tito Valentin of [facility] s/p Gemcitabine (dose reduced) /Durvalumab LD 8/19/2022 presenting with hypotension 70's/60's,  noted to be hyponatremic (Na 127) and CHA with volume depletion.    Of note patient was recently admitted to [facility] for SOB s/p left thoracentesis and was noted to be hyponatremic on that admission and was DC'd on salt tabs 1g BID, 1L fluid restriction and Lasix 40 mg for LE edema.    Stage IV Ca Gallbladder  --Under care by Dr. Tito Valentin of [facility]  --S/P Gemcitabine (reduced dose)/Durvalumab (LD 8/19/2022)  --Ongoing treatment after discharge.  I d/w Dr Valentin today re his GOC. He was offered hospice vs tx at [facility] in the past, pt wanted to give tx a try. At this time, he has multiple complications, would be a poor candidate for systemic tx if he does not improve. Per discussion with Dr Valentin, hospice appropriate    Ascites  --Profound abdominal ascites causing severe pain  --GI and IR eval appreciated, for paracentesis monday, plts 33 today. If plts <33 on Monday, would transfuse to allow for therapeutic paracentesis    Pulmonary Embolus  --Cannot anticoagulate with platelets of 20K  --pending bilateral LE doppler  --vascular eval appreciated, IVC filter if with DVT but would depend on his GOC as well    Hyponatremia  --Recently treated at [facility]  --Had been on Na tabs and fluid restriction  --Na 127 on admission  --Treatment per primary team, nephrology    CHA  --renal following, related to mulple issues, fluid imblalance    Hypotension  --Most likely secondary to volume depletion as well  --Improved with IVF  --on midodrine  --defer to renal, GI, specialists and primary team    Thrombocytopenia  -- improved today to 33  --Most likely secondary to chemotherapy  --monitor for now  --Please transfuse platelets if <10K or <50K with bleeding or prior to procedures  -- PTT low, not DIC    poor prognosis in light of ca, PE that cannot be treated, ascites, complications. Hospice is appropriate if pt cannot make any meaningful recovery, d/w Dr Valentin, pls call with questions, appreciate [facility] care assistance as well

## 2022-08-27 NOTE — CONSULT NOTE ADULT - PROBLEM SELECTOR RECOMMENDATION 5
CT A/P with peritoneal carcinomatosis with moderate to large volume ascites  -IR following for paracentesis.

## 2022-08-27 NOTE — CONSULT NOTE ADULT - PROBLEM SELECTOR RECOMMENDATION 2
CT chest with b/l pleural effusions R>L  -Pleural effusions may be in the setting of fluid overload vs malignancy  -S/p recent admission at [facility] for SOB in the setting of L pleural effusion, s/p L thoracentesis - unclear if any studies, cytology, cultures of fluid were sent. Spoke with wife (Sukhjinder), who denies having any medical records with further info  -CT chest at [facility] 8/19 also with b/l pl effusions R>L, increased since July 2022  -Keep O>I as tolerated  -F/u TTE  -Check ProBNP. CT chest with b/l pleural effusions R>L  -Pleural effusions may be in the setting of fluid overload vs malignancy  -S/p recent admission at [facility] 8/1-8/12 for SOB in the setting of L pleural effusion, s/p L thoracentesis - unclear if any studies, cytology, cultures of fluid were sent. Spoke with wife (Sukhjinder), who denies having any medical records with further info  -CT chest at [facility] 8/19 also with b/l pl effusions R>L, increased since July 2022  -Keep O>I as tolerated  -F/u TTE  -Check ProBNP.

## 2022-08-27 NOTE — PROGRESS NOTE ADULT - SUBJECTIVE AND OBJECTIVE BOX
Name of Patient : ZUHAIR NOONAN  MRN: [id number]  Date of visit: 22 @ 10:55      Subjective: Patient seen and examined. No new events except as noted. Overnight events noted - RRT was called due to worsening hypotension, NC was increased to 5L, started on Midodrine 10 TID, blood X 2 and urine cultures were sent.  Patient seen lying down in bed with HOB elevated. Reports his abdominal pain is somewhat improving.   Patient reports that he is feeling better. Was in 5L NC that has now been decreased to 4L NC.   REVIEW OF SYSTEMS:    CONSTITUTIONAL: No weakness, fevers or chills  EYES/ENT: No visual changes;  No vertigo or throat pain   NECK: No pain or stiffness  RESPIRATORY: No cough, wheezing, hemoptysis; No shortness of breath  CARDIOVASCULAR: No chest pain or palpitations  GASTROINTESTINAL: No abdominal or epigastric pain. No nausea, vomiting, or hematemesis; No diarrhea or constipation. No melena or hematochezia.  GENITOURINARY: No dysuria, frequency or hematuria  NEUROLOGICAL: No numbness or weakness  SKIN: No itching, burning, rashes, or lesions   All other review of systems is negative unless indicated above.    MEDICATIONS:  MEDICATIONS  (STANDING):  cefTRIAXone   IVPB 2000 milliGRAM(s) IV Intermittent every 24 hours  chlorhexidine 2% Cloths 1 Application(s) Topical daily  gabapentin 200 milliGRAM(s) Oral two times a day  midodrine. 20 milliGRAM(s) Oral three times a day  urea Oral Powder 30 Gram(s) Oral two times a day      PHYSICAL EXAM:  T(C): 36.4 (22 @ 03:55), Max: 36.5 (22 @ 22:53)  HR: 109 (22 @ 03:55) (99 - 114)  BP: 98/53 (22 @ 03:55) (74/60 - 99/64)  RR: 20 (22 @ 03:55) (13 - 23)  SpO2: 98% (22 @ 03:55) (92% - 99%)  Wt(kg): --  I&O's Summary    26 Aug 2022 07:01  -  27 Aug 2022 07:00  --------------------------------------------------------  IN: 0 mL / OUT: 225 mL / NET: -225 mL        Weight (kg): 110 ( @ 08:27)    Appearance: Normal	  HEENT:  PERRL  Lymphatic: No lymphadenopathy   Cardiovascular: Normal S1 S2, no JVD  Respiratory: 5 L NC, (decreased to 4L); Decreased BS as bases   Gastrointestinal:  + Abdominal distention; TTP   Skin: No rashes,  warm to touch  Psychiatry:  Mood & affect appropriate  Musculoskeletal: B/L LE Pitting edema      22 @ 07:01  -  22 @ 07:00  --------------------------------------------------------  IN: 0 mL / OUT: 225 mL / NET: -225 mL                            14.1   10.48 )-----------( 33       ( 27 Aug 2022 07:20 )             43.3                   127<L>  |  90<L>  |  61<H>  ----------------------------<  131<H>  5.3   |  23  |  1.70<H>    Ca    8.5      27 Aug 2022 07:09    TPro  6.0  /  Alb  2.5<L>  /  TBili  0.7  /  DBili  x   /  AST  67<H>  /  ALT  63<H>  /  AlkPhos  537<H>      PT/INR - ( 27 Aug 2022 07:18 )   PT: 15.2 sec;   INR: 1.31 ratio         PTT - ( 27 Aug 2022 07:18 )  PTT:23.5 sec                   Urinalysis Basic - ( 27 Aug 2022 07:20 )    Color: Yellow / Appearance: Slightly Turbid / S.038 / pH: x  Gluc: x / Ketone: Negative  / Bili: Negative / Urobili: Negative   Blood: x / Protein: 30 mg/dL / Nitrite: Negative   Leuk Esterase: Negative / RBC: 5 /hpf / WBC 15 /HPF   Sq Epi: x / Non Sq Epi: 14 /hpf / Bacteria: Negative      < from: CT Abdomen and Pelvis w/ IV Cont (22 @ 14:43) >  IMPRESSION:    CHEST:  *  Pulmonary embolism within left lower lobar and segmental pulmonary   arteries.  *  Moderate right and small left pleural effusions.  *  Scattered nonspecific subcentimeter pulmonary nodules. Irregular   interlobular septal thickening in the right lung, possibly representing   lymphangitic spread of disease.  *  Enlarged heterogeneous thyroid gland with a mixed density leftthyroid   lobe nodule measuring 2.4 cm.    ABDOMEN AND PELVIS:  *  Scattered indeterminate liver lesions, presumably the patient's known   metastatic disease.  *  Findings consistent with known peritoneal carcinomatosis. Moderate to   large volume ascites.  *  Ill-defined lesion along the pancreatic tail, possibly reflecting   metastatic disease, with a primary pancreatic neoplasm not excluded.  *  Ovoid thickening of the left ventral abdominal musculature measuring   2.3 x 3.3 x 3.7 cm, potentially representing an implant, or possibly an   intramuscular hematoma.  *  Linear metallic density in the gastric fundus, possibly an endoclip.   Correlate with patient history.      Findings of pulmonary embolism were discussed with Dr. Argueta  2022   3:49 PM by Dr. Staley with read back confirmation.    < end of copied text >      < from: US Abdomen Limited (22 @ 15:58) >    Abdominal and pelvic ascites with largest pocket in the left upper   quadrant.    < end of copied text >         Name of Patient : ZUHAIR NOONAN  MRN: [id number]  Date of visit: 22 @ 10:55      Subjective: Patient seen and examined. No new events except as noted. Overnight events noted - RRT was called due to worsening hypotension, NC was increased to 5L, started on Midodrine 10 TID, blood X 2 and urine cultures were sent.  Patient seen lying down in bed with HOB elevated. Reports his abdominal pain is somewhat improving.   Patient reports that he is feeling better. Was in 5L NC that has now been decreased to 4L NC.       REVIEW OF SYSTEMS:    CONSTITUTIONAL: Generalized weakness   EYES/ENT: No visual changes;  No vertigo or throat pain   NECK: No pain or stiffness  RESPIRATORY: + NC for SOB; No cough, wheezing, hemoptysis   CARDIOVASCULAR: No chest pain or palpitations  GASTROINTESTINAL: + ABdominal pain and distention;   GENITOURINARY: No dysuria, frequency or hematuria  NEUROLOGICAL: No numbness or weakness  SKIN: No itching, burning, rashes, or lesions   All other review of systems is negative unless indicated above.    MEDICATIONS:  MEDICATIONS  (STANDING):  cefTRIAXone   IVPB 2000 milliGRAM(s) IV Intermittent every 24 hours  chlorhexidine 2% Cloths 1 Application(s) Topical daily  gabapentin 200 milliGRAM(s) Oral two times a day  midodrine. 20 milliGRAM(s) Oral three times a day  urea Oral Powder 30 Gram(s) Oral two times a day      PHYSICAL EXAM:  T(C): 36.4 (22 @ 03:55), Max: 36.5 (22 @ 22:53)  HR: 109 (22 @ 03:55) (99 - 114)  BP: 98/53 (22 @ 03:55) (74/60 - 99/64)  RR: 20 (22 @ 03:55) (13 - 23)  SpO2: 98% (22 @ 03:55) (92% - 99%)  Wt(kg): --  I&O's Summary    26 Aug 2022 07:01  -  27 Aug 2022 07:00  --------------------------------------------------------  IN: 0 mL / OUT: 225 mL / NET: -225 mL        Weight (kg): 110 ( @ 08:27)    Appearance: Normal	  HEENT:  PERRL  Lymphatic: No lymphadenopathy   Cardiovascular: Normal S1 S2, no JVD  Respiratory: 5 L NC, (decreased to 4L); Decreased BS as bases   Gastrointestinal:  + Abdominal distention; TTP   Skin: No rashes,  warm to touch  Psychiatry:  Mood & affect appropriate  Musculoskeletal: B/L LE Pitting edema      22 @ 07:01  -  22 @ 07:00  --------------------------------------------------------  IN: 0 mL / OUT: 225 mL / NET: -225 mL                            14.1   10.48 )-----------( 33       ( 27 Aug 2022 07:20 )             43.3                   127<L>  |  90<L>  |  61<H>  ----------------------------<  131<H>  5.3   |  23  |  1.70<H>    Ca    8.5      27 Aug 2022 07:09    TPro  6.0  /  Alb  2.5<L>  /  TBili  0.7  /  DBili  x   /  AST  67<H>  /  ALT  63<H>  /  AlkPhos  537<H>      PT/INR - ( 27 Aug 2022 07:18 )   PT: 15.2 sec;   INR: 1.31 ratio         PTT - ( 27 Aug 2022 07:18 )  PTT:23.5 sec                   Urinalysis Basic - ( 27 Aug 2022 07:20 )    Color: Yellow / Appearance: Slightly Turbid / S.038 / pH: x  Gluc: x / Ketone: Negative  / Bili: Negative / Urobili: Negative   Blood: x / Protein: 30 mg/dL / Nitrite: Negative   Leuk Esterase: Negative / RBC: 5 /hpf / WBC 15 /HPF   Sq Epi: x / Non Sq Epi: 14 /hpf / Bacteria: Negative      < from: CT Abdomen and Pelvis w/ IV Cont (22 @ 14:43) >  IMPRESSION:    CHEST:  *  Pulmonary embolism within left lower lobar and segmental pulmonary   arteries.  *  Moderate right and small left pleural effusions.  *  Scattered nonspecific subcentimeter pulmonary nodules. Irregular   interlobular septal thickening in the right lung, possibly representing   lymphangitic spread of disease.  *  Enlarged heterogeneous thyroid gland with a mixed density leftthyroid   lobe nodule measuring 2.4 cm.    ABDOMEN AND PELVIS:  *  Scattered indeterminate liver lesions, presumably the patient's known   metastatic disease.  *  Findings consistent with known peritoneal carcinomatosis. Moderate to   large volume ascites.  *  Ill-defined lesion along the pancreatic tail, possibly reflecting   metastatic disease, with a primary pancreatic neoplasm not excluded.  *  Ovoid thickening of the left ventral abdominal musculature measuring   2.3 x 3.3 x 3.7 cm, potentially representing an implant, or possibly an   intramuscular hematoma.  *  Linear metallic density in the gastric fundus, possibly an endoclip.   Correlate with patient history.      Findings of pulmonary embolism were discussed with Dr. Argueta  2022   3:49 PM by Dr. Staley with read back confirmation.    < end of copied text >      < from: US Abdomen Limited (22 @ 15:58) >    Abdominal and pelvic ascites with largest pocket in the left upper   quadrant.    < end of copied text >

## 2022-08-27 NOTE — CONSULT NOTE ADULT - SUBJECTIVE AND OBJECTIVE BOX
PULMONARY CONSULT    HPI: 71 y/o M with PMH of metastatic gallbladder CA (with mets to peritoneum, pleura, liver), hyponatremia, HLD. Presents to ED from [facility] [place] for hypotension (reported SBP 70s), CHA, hyponatremia. Also admits to abdominal distention. Noted to have recent hospital admission at [facility] (3 weeks ago per patient, s/p L thoracentesis). Labs notable for hyponatremia, thrombocytopenia. On triage, afebrile with O2 sats 95% on RA. Tachycardic to 108 with BP 96/67. CT C/A/P with moderate R and small L pl effusions, scattered pulm nodules with concern for lymphangitic spread, scattered liver & pancreatic lesions, peritoneal carcinomatosis with moderate to large volume ascites Also with incidental finding of L lower lobar and segmental PE. Plan for paracentesis per IR, likely  if PLT count allows. S/p RRT this AM for hypotension. Pulmonary called to consult for pleural effusions, PE. Former "social" smoker, no hx of lung disease or inhaler use at home. Overall reports feeling "weak" with some mild SOB on admission that is resolving today. +Abdominal distention, chronic LE edema which is increased from baseline. Denies CP, pleuritic chest pain, fever, chills. O2 sats 96% on 4LNC.     PAST MEDICAL & SURGICAL HISTORY:  Gallbladder cancer    No Known Allergies    FAMILY HISTORY: non contributory    Social history: former smoker    Review of Systems:  CONSTITUTIONAL: +weakness, fatigue  EYES: No eye pain, visual disturbances, or discharge  ENMT:  No difficulty hearing, tinnitus, vertigo; No sinus or throat pain  NECK: No pain or stiffness  RESPIRATORY: Per above  CARDIOVASCULAR: +LE edema  GASTROINTESTINAL: Per above  GENITOURINARY: No dysuria, frequency, hematuria, or incontinence  NEUROLOGICAL: No headaches, memory loss, loss of strength, numbness, or tremors  SKIN: No itching, burning, rashes, or lesions   MUSCULOSKELETAL: No joint pain or swelling; No muscle, back, or extremity pain  PSYCHIATRIC: No depression, anxiety, mood swings, or difficulty sleeping    Medications:  MEDICATIONS  (STANDING):  cefTRIAXone   IVPB 2000 milliGRAM(s) IV Intermittent every 24 hours  chlorhexidine 2% Cloths 1 Application(s) Topical daily  gabapentin 200 milliGRAM(s) Oral two times a day  midodrine. 20 milliGRAM(s) Oral three times a day  urea Oral Powder 30 Gram(s) Oral two times a day    MEDICATIONS  (PRN):  HYDROmorphone  Injectable 1 milliGRAM(s) IV Push every 4 hours PRN Severe Pain (7 - 10)    Vital Signs Last 24 Hrs  T(C): 36.4 (27 Aug 2022 03:55), Max: 36.5 (26 Aug 2022 22:53)  T(F): 97.6 (27 Aug 2022 03:55), Max: 97.7 (26 Aug 2022 22:53)  HR: 109 (27 Aug 2022 03:55) (99 - 114)  BP: 98/53 (27 Aug 2022 03:55) (74/60 - 98/53)  BP(mean): 75 (26 Aug 2022 20:34) (68 - 75)  RR: 20 (27 Aug 2022 03:55) (13 - 23)  SpO2: 98% (27 Aug 2022 03:55) (92% - 99%)    Parameters below as of 27 Aug 2022 03:55  Patient On (Oxygen Delivery Method): nasal cannula  O2 Flow (L/min): 5    VBG pH 7.46 08-26 @ 14:40  VBG pCO2 44 08-26 @ 14:40  VBG O2 sat 64.8 08- @ 14:40  VBG lactate 2.1 08- @ 14:40  VBG pH 7.44 08- @ 12:40  VBG pCO2 44 08-26 @ 12:40  VBG O2 sat 59.9 08- @ 12:40  VBG lactate 2.9 08-26 @ 12:40    08-26 @ 07:01  -   @ 07:00  --------------------------------------------------------  IN: 0 mL / OUT: 225 mL / NET: -225 mL    LABS:                        14.1   10.48 )-----------( 33       ( 27 Aug 2022 07:20 )             43.3     08-27    127<L>  |  90<L>  |  61<H>  ----------------------------<  131<H>  5.3   |  23  |  1.70<H>    Ca    8.5      27 Aug 2022 07:09    TPro  6.0  /  Alb  2.5<L>  /  TBili  0.7  /  DBili  x   /  AST  67<H>  /  ALT  63<H>  /  AlkPhos  537<H>      CAPILLARY BLOOD GLUCOS  POCT Blood Glucose.: 96 mg/dL (27 Aug 2022 00:57)    PT/INR - ( 27 Aug 2022 07:18 )   PT: 15.2 sec;   INR: 1.31 ratio      PTT - ( 27 Aug 2022 07:18 )  PTT:23.5 sec  Urinalysis Basic - ( 27 Aug 2022 07:20 )    Color: Yellow / Appearance: Slightly Turbid / S.038 / pH: x  Gluc: x / Ketone: Negative  / Bili: Negative / Urobili: Negative   Blood: x / Protein: 30 mg/dL / Nitrite: Negative   Leuk Esterase: Negative / RBC: 5 /hpf / WBC 15 /HPF   Sq Epi: x / Non Sq Epi: 14 /hpf / Bacteria: Negative    Physical Examination:    General: No acute distress.      HEENT: Pupils equal, reactive to light.  Symmetric.    PULM: Decreased BS at bases    CVS: S1, S2    ABD: Soft, nontender, +distention    EXT: +LE pitting edema, increased from baseline per patient    SKIN: Warm and well perfused, no rashes noted.    NEURO: Alert, oriented, interactive, nonfocal    RADIOLOGY REVIEWED    CT chest:< from: CT Chest w/ IV Cont (22 @ 14:43) >    FINDINGS:  Streak artifact related the patient's upper extremities limits portions   of the exam, most prominently within the abdomen.    CHEST:  LUNGS, PLEURA, AND LARGE AIRWAYS: Patent central airways. Moderate right   and small left pleural effusions with associated compressive atelectasis.   Scattered bilateral pulmonary nodules, with example in the left upper   lobe measuring 4 mm (series 3 image 124), and example in the right upper   lobe measuring 4 mm (series 3 image 155). Mild interlobular septal   thickening in the right lung, which appears somewhat irregular, possibly   representing lymphangitic spread of disease.  VESSELS: Right chest wall port with catheter tip in the right atrium.   Atherosclerotic changes. Filling defect within the left lower lobar and   segmental pulmonary arteries, concerning for pulmonary embolism (series   601 image 85).  HEART: Heart size is normal. Mitral annular calcification. No pericardial   effusion.  MEDIASTINUM AND DARYL: No lymphadenopathy.  CHEST WALL AND LOWER NECK: Enlarged heterogeneous thyroid gland with   mixed density left thyroid nodule measuring approximate 2.4 cm (series 2  image 2).    ABDOMEN AND PELVIS:    LIVER: Scattered indeterminate hypodense liver lesions, with a larger   example at the dome measuring 4.0 cm. Another example measuring   approximately 2.6 cm at the posterior dome has an associated coarse   calcification.  BILE DUCTS: Normal caliber.  GALLBLADDER: Hyperdensity within the gallbladder may represent sludge, or   possibly the patient's known malignancy.  SPLEEN: Within normal limits.  PANCREAS: Ill-defined hypoattenuating lesion along the pancreatic tail   measuring approximately 3.8 x 2.1 cm (series 2 image 75). Abnormal tissue   extends into the splenic hilum with likely encasement of splenic vessels   and abuts the adjacent stomach.  ADRENALS: Within normal limits.  KIDNEYS/URETERS: A fewpunctate nonobstructing calculi in the upper pole   of the right kidney. No hydronephrosis.    Portions of the pelvis are obscured by streak artifact from a left hip   prosthesis.    BLADDER: Partially obscured by streak artifact; grossly within normal   limits.  REPRODUCTIVE ORGANS: Partially obscured by streak artifact. The prostate   is normal in size.    BOWEL: Linear metallic density measuring approximately 2 cm in length at   the gastric fundus. Colonic diverticulosis. No bowel obstruction.  PERITONEUM: Moderate to large volume ascites. Infiltration of the   peritoneum with appearance of omental caking. Sites of irregular   peritoneal thickening with nodularity, with example in the left lower   quadrant (series 2 image 132).  VESSELS: Atherosclerotic changes.  RETROPERITONEUM/LYMPH NODES: No lymphadenopathy.  ABDOMINAL WALL: Small fat-containing left inguinal hernia. Ovoid   thickening of the left ventral abdominal wall musculature measuring 2.3 x   3.3 x 3.7 cm (series 2 image 127). Subcutaneous infiltration in the upper   ventral abdominal wall.  BONES: Left hip arthroplasty. Degenerative changes. Grade 1   anterolisthesis of L4 over L5. At least moderate central canal narrowing   at L4-L5. Calcified bodies extending inferiorly below the left   glenohumeral joint. Calcified body adjacent to the right glenohumeral   joint.    IMPRESSION:    CHEST:  *  Pulmonary embolism within left lower lobar and segmental pulmonary   arteries.  *  Moderate right and small left pleural effusions.  *  Scattered nonspecific subcentimeter pulmonary nodules. Irregular   interlobular septal thickening in the right lung, possibly representing   lymphangitic spread of disease.  *  Enlarged heterogeneous thyroid gland with a mixed density leftthyroid   lobe nodule measuring 2.4 cm.    ABDOMEN AND PELVIS:  *  Scattered indeterminate liver lesions, presumably the patient's known   metastatic disease.  *  Findings consistent with known peritoneal carcinomatosis. Moderate to   large volume ascites.  *  Ill-defined lesion along the pancreatic tail, possibly reflecting   metastatic disease, with a primary pancreatic neoplasm not excluded.  *  Ovoid thickening of the left ventral abdominal musculature measuring   2.3 x 3.3 x 3.7 cm, potentially representing an implant, or possibly an   intramuscular hematoma.  *  Linear metallic density in the gastric fundus, possibly an endoclip.   Correlate with patient history.      Findings of pulmonary embolism were discussed with Dr. Argueta  2022   3:49 PM by Dr. Staley with read back confirmation.      < end of copied text >

## 2022-08-27 NOTE — PROVIDER CONTACT NOTE (OTHER) - BACKGROUND
Pt admitted with hypotension. PMH: gallbladder ca with mets
Patient with ascites, and fluid on bladder scan inaccurate. Previously straight cathed this morning at 0645.

## 2022-08-27 NOTE — CHART NOTE - NSCHARTNOTEFT_GEN_A_CORE
Mr. 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 Tito Valentin of [facility] [place]p Gemcitabine (dose reduced) /Durvalumab LD 8/19/2022 presenting with hypotension 70's/60's,  noted to be hyponatremic (Na 127) and CHA with volume depletion. Patient received a bolus of NS of 500mL with improvement of BP to 80/60s.    RRT called for hypotension to systolic of 80s blood pressure.     At bedside, patient is A&O x3 and responds coherently to questions, repeat blood pressure systolic 90s without intervention. Etiology of hypotension was thought to be volume depletion in setting of being initiated on lasix and fluids restriction at OSH. Received 500cc fluids at OSH. Currently on midodrine 10mg TID for hypotension. Other vitals - afebrile, spo2 briefly dropped to 86%, however patient with cold extremities -> increased nasal cannula to 5L.     Recommendations:   - hypotension - etiology could be volume depletion vs sepsis vs cardiogenic. Less likely hemorrhagic as no gross bleeding, hb is wnl.   [ ] please send infection work up including blood cultures (ordered). f/u urine culture.   [ ] continue midodrine 10mg TID. If MAP<65, increase to 20mg TID  [ ] blood pressure q4h   [ ] therapeutic paracentesis on 8/29 pending platelet improvement to 30 Mr. 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 [facility] [place]dose [place] /Durvalumab LD 8/19/2022 presenting with hypotension 70's/60's,  noted to be hyponatremic (Na 127) and CHA with volume depletion. Patient received a bolus of NS of 500mL with improvement of BP to 80/60s.    RRT called for hypotension to systolic of 80s blood pressure.     At bedside, patient is A&O x3 and responds coherently to questions, repeat blood pressure systolic 90s without intervention. Etiology of hypotension was thought to be volume depletion in setting of being initiated on lasix and fluids restriction at OSH. Received 500cc fluids at OSH. Currently on midodrine 10mg TID for hypotension. Other vitals - afebrile, spo2 briefly dropped to 86%, however patient with cold extremities -> increased nasal cannula to 5L.     Recommendations:   - hypotension - etiology could be volume depletion vs sepsis vs cardiogenic. Less likely hemorrhagic as no gross bleeding, hb is wnl.   [ ] please send infection work up including blood cultures (ordered). f/u urine culture.   [ ] continue midodrine 10mg TID. If MAP<65, increase to 20mg TID  [ ] blood pressure q4h   [ ] f/u TTE to evaluate for cardiac etiology given cold extremities  [ ] therapeutic paracentesis on 8/29 pending platelet improvement to 30

## 2022-08-27 NOTE — PROGRESS NOTE ADULT - ASSESSMENT
Patient is a 72 year old Male with PMHx of gallbladder cancer with metastasis to the peritoneum, right pleura, liver, hyponatremia who presents to [facility] from outside laboratory for hypotension, CHA and hyponatremia. Patient admits to abdominal distention, and was recently admitted for a thoracentesis.  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 --> Checking urine & serum Na and osmolality; Cortisol, TSH, urine protein and cortisol per renal     - Previously on Salt tablets, will hold off pending renal recs --> Salt tablets d/c and Urea 30 mg BID started as per renal recs    - Avoid overcorrection of > 6-8mEq in 24 hours, monitor Na closely  - Renal consult appreciated; F/u recs  - Diet with fluid restriction to 1 L    - Lasix on hold in view of Hyponatremia, CHA and CT with contrast --> defer resumption of Lasix as per renal   - Repeat BMP in AM; monitor Na closely     Hypotension  - Likely due to volume depletion;  S/P 500 cc IVF bolus at outside facility   - Continue to monitor BP, VS, HR and patient closely  - If further hypotension will consider trial of Midodrine at that time --> Midodrine 10 TID increased to 20 TID in view of hypotension. Please hold Midodrine for SBP > 140 (Provider to RN placed)  - Fall precautions  - Check Echo   - Overnight events noted - RRT was called due to worsening hypotension, NC was increased to 5L, started on Midodrine 10 TID - has now been increased to 20 TID, blood and urine cultures were sent --> F/u results   - If worsening hemodynamics, low threshold for MICU consultation     Ascites  - Likely due to fluid overload in extravascular space   - F/u Abdominal US results--> With abdominal and pelvic ascites --> planned for IR paracentesis on Monday 08/29 with platelet goal on > 30K, transfuse accordingly   - IR consult appreciated --> likely on Monday per IR  - Pain control   - C/w Rocephin for prophylaxis for now    Pulmonary Embolism  - Found on CT Chest with IV Contrast  - Likely cause of elevated troponin   - Unable to AC in view of thrombocytopenia  - Vascular cardiology consulted; F/u recs  - Check Echo, LE duplex to r/o DVT   - Monitor on Telemetry  - Pulmonology consulted; F/u recs    Thrombocytopenia  - Platelets were reported at 27K at [facility] 08/26  - Likely due to Chemotherapy   - WIll check PT/PTT and Fibrinogen as per Heme/Onc  - Plan to transfuse platelets if <10K or <50K with bleeding or prior to procedures --> Per IR, goal of Platelets is > 30K for paracentesis     Elevated troponin  - Likely due to pulmonary embolism and demand Ischemic   - Will trend troponin in AM   - Check Echo   - Vascular cardiology consult appreciated; F/u recs    CHA  - Cr of 1.70 on AM labs likely due to CT with contrast   - Patient received CT w/ IV Contrast, monitor closely for YULIA   - Trend on daily labs   - Avoid nephrotoxic agents  - Renal consulted; F/u recs    Pleural Effusion and LE edema  - S/P recent admission at OSH for thoracentesis   - Lasix on hold in view of CHA and CT with contrast; defer resumption of lasix per renal   - C/w Fluid restriction   - Monitor closely  - Check Echo and Duplex  - Pulm consulted; f/u recs    Stage IV Ca Gallbladder  - [place] [facility] [place]  - S/P Gemcitabine /Durvalumab (LD 8/19/2022)  - Heme/Onc follow up   - Outpatient [facility] follow up   - CT with diffuse metastasis --> patient will require oncology follow up   - Elevated LFTs likely due to hepatic metastasis; continue to trend  - Palliative has been consulted    Medication management  - Patient and daughter unsure of medications/ dosages.  - Patient and daughter aware of Lasix 40 (being held), Salt tablets, and and gabapentin 200 BID  - Have asked Daughter to bring in updated list of medications, who states that she will     PPX  - Hold off on pharm DVT PPX for now  - PPI     Discussed with patient at length at the bedside.      Patient is a 72 year old Male with PMHx of gallbladder cancer with metastasis to the peritoneum, right pleura, liver, hyponatremia who presents to [facility] from outside laboratory for hypotension, CHA and hyponatremia. Patient admits to abdominal distention, and was recently admitted for a thoracentesis.  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 --> Checking urine & serum Na and osmolality; Cortisol, TSH, urine protein and cortisol per renal     - Previously on Salt tablets, will hold off pending renal recs --> Salt tablets d/c and Urea 30 mg BID started as per renal recs    - Avoid overcorrection of > 6-8mEq in 24 hours, monitor Na closely  - Renal consult appreciated; F/u recs  - Diet with fluid restriction to 1 L    - Lasix on hold in view of Hyponatremia, CHA and CT with contrast --> defer resumption of Lasix as per renal   - Repeat BMP in AM; monitor Na closely     Hypotension  - Likely due to volume depletion;  S/P 500 cc IVF bolus at outside facility   - Continue to monitor BP, VS, HR and patient closely  - If further hypotension will consider trial of Midodrine at that time --> Midodrine 10 TID increased to 20 TID in view of hypotension. Please hold Midodrine for SBP > 140 (Provider to RN placed)  - Fall precautions  - Check Echo   - Overnight events noted - RRT was called due to worsening hypotension, NC was increased to 5L, started on Midodrine 10 TID - has now been increased to 20 TID, blood and urine cultures were sent --> F/u results   - If worsening hemodynamics, low threshold for MICU consultation     Ascites  - Likely due to fluid overload in extravascular space   - F/u Abdominal US results--> With abdominal and pelvic ascites --> planned for IR paracentesis on Monday 08/29 with platelet goal on > 30K, transfuse accordingly   - IR consult appreciated --> likely on Monday per IR  - Pain control   - C/w Rocephin for prophylaxis for now    Pulmonary Embolism  - Found on CT Chest with IV Contrast  - Likely cause of elevated troponin   - Unable to AC in view of thrombocytopenia  - Vascular cardiology consulted; F/u recs  - Check Echo, LE duplex to r/o DVT   - Monitor on Telemetry  - Pulmonology consulted; F/u recs    Thrombocytopenia  - Platelets were reported at 27K at [facility] 08/26  - Likely due to Chemotherapy   - WIll check PT/PTT and Fibrinogen as per Heme/Onc  - Plan to transfuse platelets if <10K or <50K with bleeding or prior to procedures --> Per IR, goal of Platelets is > 30K for paracentesis     Elevated troponin  - Likely due to pulmonary embolism and demand Ischemic   - Will trend troponin in AM   - Check Echo   - Vascular cardiology consult appreciated; F/u recs    CHA  - Cr of 1.70 on AM labs likely due to CT with contrast   - Patient received CT w/ IV Contrast, monitor closely for YULIA   - Trend on daily labs   - Avoid nephrotoxic agents  - Renal consulted; F/u recs    Pleural Effusion and LE edema  - S/P recent admission at OSH for thoracentesis   - Lasix on hold in view of CHA and CT with contrast; defer resumption of lasix per renal   - C/w Fluid restriction   - Monitor closely  - Check Echo and Duplex  - Pulm consulted; f/u recs  - CHeck TTE  - Check BNP     Stage IV Ca Gallbladder  - Under care by Dr. Tito Valentin of [facility] [place]  - S/P Gemcitabine /Durvalumab (LD 8/19/2022)  - Heme/Onc follow up   - [place] [facility] [place]   - CT with diffuse metastasis --> patient will require oncology follow up   - Elevated LFTs likely due to hepatic metastasis; continue to trend  - Palliative has been consulted  - Registered dietician consulted as per wife's request    Medication management  - Patient and daughter unsure of medications/ dosages.  - Patient and daughter aware of Lasix 40 (being held), Salt tablets, and and gabapentin 200 BID  - Have asked Daughter to bring in updated list of medications, who states that she will     HLD  - On Rosuvastatin 20 at home  - Lipid panel with LDL of 34  - Will hold for now in view of elevated LFTs    Gout  - Outpatient follow up       PPX  - Hold off on pharm DVT PPX for now  - PPI     Discussed with patient at length at the bedside. Discussed with Wife via telephone at length. All questions answered in detail.

## 2022-08-28 NOTE — PROGRESS NOTE ADULT - SUBJECTIVE AND OBJECTIVE BOX
Subjective: Patient seen and examined. No new events except as noted.   lethargic       MEDICATIONS:  MEDICATIONS  (STANDING):  albumin human 25% IVPB 100 milliLiter(s) IV Intermittent every 12 hours  cefTRIAXone   IVPB 2000 milliGRAM(s) IV Intermittent every 24 hours  chlorhexidine 2% Cloths 1 Application(s) Topical daily  gabapentin 200 milliGRAM(s) Oral two times a day  midodrine. 20 milliGRAM(s) Oral three times a day  pantoprazole    Tablet 40 milliGRAM(s) Oral before breakfast  senna 2 Tablet(s) Oral at bedtime      PHYSICAL EXAM:  T(C): 36.8 (22 @ 21:53), Max: 36.8 (22 @ 21:53)  HR: 88 (22 @ 21:53) (88 - 95)  BP: 115/64 (22 @ 21:53) (95/67 - 116/74)  RR: 18 (22 @ 21:53) (18 - 18)  SpO2: 97% (22 @ 21:53) (93% - 100%)  Wt(kg): --  I&O's Summary    27 Aug 2022 07:  -  28 Aug 2022 07:00  --------------------------------------------------------  IN: 0 mL / OUT: 405 mL / NET: -405 mL    28 Aug 2022 07:  -  28 Aug 2022 22:37  --------------------------------------------------------  IN: 462 mL / OUT: 0 mL / NET: 462 mL          Appearance: Normal	  HEENT:   Normal oral mucosa, PERRL, EOMI	  Lymphatic: No lymphadenopathy , no edema  Cardiovascular: Normal S1 S2, No JVD, No murmurs , Peripheral pulses palpable 2+ bilaterally  Respiratory: Lungs clear to auscultation, normal effort 	  Gastrointestinal:  ascites, distended   Skin: No rashes, No ecchymoses, No cyanosis, warm to touch  Musculoskeletal: Normal range of motion, normal strength  Psychiatry:  Mood & affect appropriate  Ext: LE edema       ECG:  	  RADIOLOGY:   DIAGNOSTIC TESTIN.4   13.19 )-----------( 35       ( 28 Aug 2022 07:19 )             42.1                   128<L>  |  88<L>  |  91<H>  ----------------------------<  116<H>  5.3   |  25  |  2.33<H>    Ca    8.9      28 Aug 2022 07:14    TPro  5.7<L>  /  Alb  2.7<L>  /  TBili  0.5  /  DBili  x   /  AST  63<H>  /  ALT  46<H>  /  AlkPhos  535<H>      PT/INR - ( 27 Aug 2022 07:18 )   PT: 15.2 sec;   INR: 1.31 ratio         PTT - ( 27 Aug 2022 07:18 )  PTT:23.5 sec                   Urinalysis Basic - ( 27 Aug 2022 07:20 )    Color: Yellow / Appearance: Slightly Turbid / S.038 / pH: x  Gluc: x / Ketone: Negative  / Bili: Negative / Urobili: Negative   Blood: x / Protein: 30 mg/dL / Nitrite: Negative   Leuk Esterase: Negative / RBC: 5 /hpf / WBC 15 /HPF   Sq Epi: x / Non Sq Epi: 14 /hpf / Bacteria: Negative

## 2022-08-28 NOTE — PROGRESS NOTE ADULT - SUBJECTIVE AND OBJECTIVE BOX
Chief Complaint:  Patient is a 72y old  Male who presents with a chief complaint of Hyponatremia and CHA (28 Aug 2022 11:05)      Date of service 22 @ 12:32      Interval Events:   increasing abdominal distention and discomfort.     Hospital Medications:  albumin human 25% IVPB 100 milliLiter(s) IV Intermittent every 12 hours  cefTRIAXone   IVPB 2000 milliGRAM(s) IV Intermittent every 24 hours  chlorhexidine 2% Cloths 1 Application(s) Topical daily  gabapentin 200 milliGRAM(s) Oral two times a day  HYDROmorphone  Injectable 1 milliGRAM(s) IV Push every 3 hours PRN  midodrine. 20 milliGRAM(s) Oral three times a day  oxyCODONE    IR 10 milliGRAM(s) Oral every 4 hours PRN  pantoprazole    Tablet 40 milliGRAM(s) Oral before breakfast  polyethylene glycol 3350 17 Gram(s) Oral daily PRN  senna 2 Tablet(s) Oral at bedtime  sodium zirconium cyclosilicate 10 Gram(s) Oral once        Review of Systems:  General:  No wt loss, fevers, chills, night sweats, fatigue,   Eyes:  Good vision, no reported pain  ENT:  No sore throat, pain, runny nose, dysphagia  CV:  No pain, palpitations, hypo/hypertension  Resp:  No dyspnea, cough, tachypnea, wheezing  GI:  See HPI  :  No pain, bleeding, incontinence, nocturia  Muscle:  No pain, weakness  Neuro:  No weakness, tingling, memory problems  Psych:  No fatigue, insomnia, mood problems, depression  Endocrine:  No polyuria, polydipsia, cold/heat intolerance  Heme:  No petechiae, ecchymosis, easy bruisability  Integumentary:  No rash, edema    PHYSICAL EXAM:   Vital Signs:  Vital Signs Last 24 Hrs  T(C): 36.6 (28 Aug 2022 05:55), Max: 37.1 (27 Aug 2022 20:40)  T(F): 97.9 (28 Aug 2022 05:55), Max: 98.7 (27 Aug 2022 20:40)  HR: 93 (28 Aug 2022 05:55) (93 - 98)  BP: 95/67 (28 Aug 2022 05:55) (92/62 - 100/71)  BP(mean): --  RR: 18 (28 Aug 2022 05:55) (18 - 18)  SpO2: 97% (28 Aug 2022 06:45) (97% - 100%)    Parameters below as of 28 Aug 2022 06:45  Patient On (Oxygen Delivery Method): nasal cannula  O2 Flow (L/min): 2    Daily     Daily Weight in k (28 Aug 2022 08:50)      PHYSICAL EXAM:     GENERAL:  Appears stated age, well-groomed, well-nourished, no distress  HEENT:  NC/AT,  conjunctivae anicteric, clear and pink,   NECK: supple, trachea midline  CHEST:  Full & symmetric excursion, no increased effort, breath sounds clear  HEART:  Regular rhythm, no JVD  ABDOMEN:  Soft, non-tender, non-distended, normoactive bowel sounds,  no masses , no hepatosplenomegaly  EXTREMITIES:  no cyanosis,clubbing or edema  SKIN:  No rash, erythema, or, ecchymoses, no jaundice  NEURO:  Alert, non-focal, no asterixis  PSYCH: Appropriate affect, oriented to place and time  RECTAL: Deferred      LABS Personally reviewed by me:                        13.4   13.19 )-----------( 35       ( 28 Aug 2022 07:19 )             42.1     Mean Cell Volume: 97.5 fl (- @ 07:19)        128<L>  |  88<L>  |  91<H>  ----------------------------<  116<H>  5.3   |  25  |  2.33<H>    Ca    8.9      28 Aug 2022 07:14    TPro  5.7<L>  /  Alb  2.7<L>  /  TBili  0.5  /  DBili  x   /  AST  63<H>  /  ALT  46<H>  /  AlkPhos  535<H>      LIVER FUNCTIONS - ( 28 Aug 2022 07:14 )  Alb: 2.7 g/dL / Pro: 5.7 g/dL / ALK PHOS: 535 U/L / ALT: 46 U/L / AST: 63 U/L / GGT: x           PT/INR - ( 27 Aug 2022 07:18 )   PT: 15.2 sec;   INR: 1.31 ratio         PTT - ( 27 Aug 2022 07:18 )  PTT:23.5 sec  Urinalysis Basic - ( 27 Aug 2022 07:20 )    Color: Yellow / Appearance: Slightly Turbid / S.038 / pH: x  Gluc: x / Ketone: Negative  / Bili: Negative / Urobili: Negative   Blood: x / Protein: 30 mg/dL / Nitrite: Negative   Leuk Esterase: Negative / RBC: 5 /hpf / WBC 15 /HPF   Sq Epi: x / Non Sq Epi: 14 /hpf / Bacteria: Negative                              13.4   13.19 )-----------( 35       ( 28 Aug 2022 07:19 )             42.1                         14.1   10.48 )-----------( 33       ( 27 Aug 2022 07:20 )             43.3                         14.8   9.30  )-----------( 25       ( 26 Aug 2022 13:06 )             46.2       Imaging personally reviewed by me:

## 2022-08-28 NOTE — PROGRESS NOTE ADULT - PROBLEM SELECTOR PLAN 1
Controlled  Used 1 PRN in the past 24hrs  Would recommend:  - Continue with Oxycodone 10mg q4hrs PRN  - Continue with Dilaudid 1mg IV q3hrs for severe pain

## 2022-08-28 NOTE — PROGRESS NOTE ADULT - ASSESSMENT
Patient is a 72 year old Male with PMHx of gallbladder cancer with metastasis to the peritoneum, right pleura, liver, hyponatremia who presents to [facility] from outside laboratory for hypotension, CHA and hyponatremia. Patient admits to abdominal distention, and was recently admitted for a thoracentesis.  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 --> Checking urine & serum Na and osmolality; Cortisol, TSH, urine protein and cortisol per renal     - Previously on Salt tablets, will hold off pending renal recs --> Salt tablets d/c and Urea 30 mg BID started as per renal recs    - Avoid overcorrection of > 6-8mEq in 24 hours, monitor Na closely  - Renal consult appreciated; F/u recs  - Diet with fluid restriction to 1 L    - Lasix on hold in view of Hyponatremia, CHA and CT with contrast --> defer resumption of Lasix as per renal   - Repeat BMP in AM; monitor Na closely     Hypotension  - Likely due to volume depletion;  S/P 500 cc IVF bolus at outside facility   - Continue to monitor BP, VS, HR and patient closely  - If further hypotension will consider trial of Midodrine at that time --> Midodrine 10 TID increased to 20 TID in view of hypotension. Please hold Midodrine for SBP > 140 (Provider to RN placed)  - Fall precautions  - Check Echo   - Overnight events noted - RRT was called due to worsening hypotension, NC was increased to 5L, started on Midodrine 10 TID - has now been increased to 20 TID, blood and urine cultures were sent --> F/u results   - If worsening hemodynamics, low threshold for MICU consultation     Ascites  - Likely due to fluid overload in extravascular space   - F/u Abdominal US results--> With abdominal and pelvic ascites --> planned for IR paracentesis on Monday 08/29 with platelet goal on > 30K, transfuse accordingly   - IR consult appreciated --> likely on Monday per IR  - Pain control   - C/w Rocephin for prophylaxis for now    Pulmonary Embolism  - Found on CT Chest with IV Contrast  - Likely cause of elevated troponin   - Unable to AC in view of thrombocytopenia  - Vascular cardiology consulted; F/u recs  - Check Echo, LE duplex to r/o DVT   - Monitor on Telemetry  - Pulmonology consulted; F/u recs    Thrombocytopenia  - Platelets were reported at 27K at [facility] 08/26  - Likely due to Chemotherapy   - WIll check PT/PTT and Fibrinogen as per Heme/Onc  - Plan to transfuse platelets if <10K or <50K with bleeding or prior to procedures --> Per IR, goal of Platelets is > 30K for paracentesis     Elevated troponin  - Likely due to pulmonary embolism and demand Ischemic   - Will trend troponin in AM   - Check Echo   - Vascular cardiology consult appreciated; F/u recs    CHA  - Cr of 1.70 on AM labs likely due to CT with contrast   - Patient received CT w/ IV Contrast, monitor closely for YULIA   - Trend on daily labs   - Avoid nephrotoxic agents  - Renal consulted; F/u recs    Pleural Effusion and LE edema  - S/P recent admission at OSH for thoracentesis   - Lasix on hold in view of CHA and CT with contrast; defer resumption of lasix per renal   - C/w Fluid restriction   - Monitor closely  - Check Echo and Duplex  - Pulm consulted; f/u recs  - CHeck TTE  - Check BNP     Stage IV Ca Gallbladder  - Under care by Dr. Tito Valentin of [facility] [place]  - S/P Gemcitabine /Durvalumab (LD 8/19/2022)  - Heme/Onc follow up   - [place] [facility] [place]   - CT with diffuse metastasis --> patient will require oncology follow up   - Elevated LFTs likely due to hepatic metastasis; continue to trend  - Palliative has been consulted  - Registered dietician consulted as per wife's request    Medication management  - Patient and daughter unsure of medications/ dosages.  - Patient and daughter aware of Lasix 40 (being held), Salt tablets, and and gabapentin 200 BID  - Have asked Daughter to bring in updated list of medications, who states that she will     HLD  - On Rosuvastatin 20 at home  - Lipid panel with LDL of 34  - Will hold for now in view of elevated LFTs    Gout  - Outpatient follow up       PPX  - Hold off on pharm DVT PPX for now  - PPI     Discussed with patient at length at the bedside. Discussed with Wife via telephone at length. All questions answered in detail.

## 2022-08-28 NOTE — PROGRESS NOTE ADULT - SUBJECTIVE AND OBJECTIVE BOX
SUBJECTIVE AND OBJECTIVE:  Pt indicates that pain is gone unless he presses on his abdomen, he develops mild pain    Indication for Geriatrics and Palliative Care Services/INTERVAL HPI: symptom management and GOC    OVERNIGHT EVENTS:  Used 1 PRN of oxy in the past 24hrs    DNR on chart:  Allergies    No Known Allergies    Intolerances    MEDICATIONS  (STANDING):  albumin human 25% IVPB 100 milliLiter(s) IV Intermittent every 12 hours  cefTRIAXone   IVPB 2000 milliGRAM(s) IV Intermittent every 24 hours  chlorhexidine 2% Cloths 1 Application(s) Topical daily  gabapentin 200 milliGRAM(s) Oral two times a day  midodrine. 20 milliGRAM(s) Oral three times a day  pantoprazole    Tablet 40 milliGRAM(s) Oral before breakfast  senna 2 Tablet(s) Oral at bedtime  urea Oral Powder 30 Gram(s) Oral two times a day    MEDICATIONS  (PRN):  HYDROmorphone  Injectable 1 milliGRAM(s) IV Push every 3 hours PRN Severe Pain (7 - 10)  oxyCODONE    IR 10 milliGRAM(s) Oral every 4 hours PRN Moderate Pain (4 - 6)  polyethylene glycol 3350 17 Gram(s) Oral daily PRN Constipation    ITEMS UNCHECKED ARE NOT PRESENT    PRESENT SYMPTOMS: [ ]Unable to self-report - see [ ] CPOT [ ] PAINADS [ ] RDOS  Source if other than patient:  [ ]Family   [ ]Team     Pain:  [ ]yes [x ]no  QOL impact -   Location -                    Aggravating factors -  Quality -  Radiation -  Timing-  Severity (0-10 scale):  Minimal acceptable level (0-10 scale):     CPOT:    https://www.[facility].org/getattachment/[email or web address]/Critical-Care-Pain-Observation-Tool-(CPOT)    PAIN AD Score:	  http://geriatrictoolkit.[place].[facility]/cog/painad.pdf (Ctrl + left click to view)    Dyspnea:                           [ ]Mild [ ]Moderate [ ]Severe    RDOS:  0 to 2  minimal or no respiratory distress   3  mild distress  4 to 6 moderate distress  >7 severe distress  https://homecareinformation.net/handouts/hen/Respiratory_Distress_Observation_Scale.pdf (Ctrl +  left click to view)     Anxiety:                             [ ]Mild [ ]Moderate [ ]Severe  Fatigue:                             [ ]Mild [ ]Moderate [ ]Severe  Nausea:                             [ ]Mild [ ]Moderate [ ]Severe  Loss of appetite:              [ ]Mild [ ]Moderate [ ]Severe  Constipation:                    [x ]Mild [ ]Moderate [ ]Severe    PCSSQ[Palliative Care Spiritual Screening Question]   Severity (0-10):  Score of 4 or > indicate consideration of Chaplaincy referral.    Chaplaincy Referral: [ ] yes [ ] refused [ ] following    Other Symptoms:  [ ]All other review of systems negative     Palliative Performance Status Version 2:  50 %      http://npcrc.org/files/news/palliative_performance_scale_ppsv2.pdf    PHYSICAL EXAM:  Vital Signs Last 24 Hrs  T(C): 36.6 (28 Aug 2022 05:55), Max: 37.1 (27 Aug 2022 20:40)  T(F): 97.9 (28 Aug 2022 05:55), Max: 98.7 (27 Aug 2022 20:40)  HR: 93 (28 Aug 2022 05:55) (93 - 99)  BP: 95/67 (28 Aug 2022 05:55) (92/62 - 100/71)  BP(mean): --  RR: 18 (28 Aug 2022 05:55) (18 - 18)  SpO2: 97% (28 Aug 2022 06:45) (97% - 100%)    Parameters below as of 28 Aug 2022 06:45  Patient On (Oxygen Delivery Method): nasal cannula  O2 Flow (L/min): 2   I&O's Summary    27 Aug 2022 07:01  -  28 Aug 2022 07:00  --------------------------------------------------------  IN: 0 mL / OUT: 405 mL / NET: -405 mL    GENERAL: [ ]Cachexia    [x ]Alert  [x ]Oriented x 3  [ ]Lethargic  [ ]Unarousable  [x ]Verbal  [ ]Non-Verbal  Behavioral:   [ ]Anxiety  [ ]Delirium [ ]Agitation [ ]Other  HEENT:  [x ]Normal   [ ]Dry mouth   [ ]ET Tube/Trach  [ ]Oral lesions  PULMONARY:   [ ]Clear [ ]Tachypnea  [ ]Audible excessive secretions   [x ]Rhonchi        [ ]Right [ ]Left [ ]Bilateral  [ ]Crackles        [ ]Right [ ]Left [ ]Bilateral  [ ]Wheezing     [ ]Right [ ]Left [ ]Bilateral  [ ]Diminished BS [ ] Right [ ]Left [ ]Bilateral  CARDIOVASCULAR:    [ x]Regular [ ]Irregular [ ]Tachy  [ ]Gordon [ ]Murmur [ ]Other  GASTROINTESTINAL:  [ ]Soft  [x ]Distended   [x ]+BS  [ ]Non tender [ x]Tender  [ ]Other [ ]PEG [ ]OGT/ NGT   Last BM: none since admission  GENITOURINARY:  [x ]Normal [ ]Incontinent   [ ]Oliguria/Anuria   [ ][place]  MUSCULOSKELETAL:   [ ]Normal   [ x]Weakness  [ ]Bed/Wheelchair bound [ ]Edema  NEUROLOGIC:   [x ]No focal deficits  [ ] Cognitive impairment  [ ] Dysphagia [ ]Dysarthria [ ] Paresis [ ]Other   SKIN:   [x ]Normal  [ ]Rash  [ ]Other  [ ]Pressure ulcer(s) [ ]y [ ]n present on admission    CRITICAL CARE:  [ ]Shock Present  [ ]Septic [ ]Cardiogenic [ ]Neurologic [ ]Hypovolemic  [ ]Vasopressors [ ]Inotropes  [ ]Respiratory failure present [ ]Mechanical Ventilation [ ]Non-invasive ventilatory support [ ]High-Flow   [ ]Acute  [ ]Chronic [ ]Hypoxic  [ ]Hypercarbic [ ]Other  [ ]Other organ failure     LABS:                        13.4   13.19 )-----------( 35       ( 28 Aug 2022 07:19 )             42.1   08    128<L>  |  88<L>  |  91<H>  ----------------------------<  116<H>  5.3   |  25  |  2.33<H>    Ca    8.9      28 Aug 2022 07:14    TPro  5.7<L>  /  Alb  2.7<L>  /  TBili  0.5  /  DBili  x   /  AST  63<H>  /  ALT  46<H>  /  AlkPhos  535<H>    PT/INR - ( 27 Aug 2022 07:18 )   PT: 15.2 sec;   INR: 1.31 ratio       PTT - ( 27 Aug 2022 07:18 )  PTT:23.5 sec    Urinalysis Basic - ( 27 Aug 2022 07:20 )    Color: Yellow / Appearance: Slightly Turbid / S.038 / pH: x  Gluc: x / Ketone: Negative  / Bili: Negative / Urobili: Negative   Blood: x / Protein: 30 mg/dL / Nitrite: Negative   Leuk Esterase: Negative / RBC: 5 /hpf / WBC 15 /HPF   Sq Epi: x / Non Sq Epi: 14 /hpf / Bacteria: Negative    RADIOLOGY & ADDITIONAL STUDIES: reviewed    Protein Calorie Malnutrition Present: [ ]mild [ ]moderate [ ]severe [ ]underweight [ ]morbid obesity  https://www.andeal.org/vault/2440/web/files/ONC/Table_Clinical%20Characteristics%20to%20Document%20Malnutrition-White%20JV%20et%20al%2020.pdf    Height (cm): 182.9 (22 @ 11:38)  Weight (kg): 110 (22 @ 08:27)  BMI (kg/m2): 32.9 (22 @ 08:27)    [ ]PPSV2 < or = 30%  [ ]significant weight loss [ ]poor nutritional intake [ ]anasarca[ ]Artificial Nutrition    Other REFERRALS:  [ ]Hospice  [ ]Child Life  [ ]Social Work  [ ]Case management [ ]Holistic Therapy     Goals of Care Document:

## 2022-08-28 NOTE — PROGRESS NOTE ADULT - ASSESSMENT
72 y.o M w/ hx of recent dx of gallbladder cancer with metastasis who presents to hospital for hypotension, found to have creatinine of 1.25, BUN (unknown baseline) and hyponatremia to 127 likely in the setting of worsening ascites.

## 2022-08-28 NOTE — PROGRESS NOTE ADULT - SUBJECTIVE AND OBJECTIVE BOX
Patient seen and examined at bedside. pt has some lower abd pain. has not been out of bed     MEDICATIONS  (STANDING):  albumin human 25% IVPB 100 milliLiter(s) IV Intermittent every 12 hours  cefTRIAXone   IVPB 2000 milliGRAM(s) IV Intermittent every 24 hours  chlorhexidine 2% Cloths 1 Application(s) Topical daily  gabapentin 200 milliGRAM(s) Oral two times a day  midodrine. 20 milliGRAM(s) Oral three times a day  pantoprazole    Tablet 40 milliGRAM(s) Oral before breakfast  senna 2 Tablet(s) Oral at bedtime  urea Oral Powder 30 Gram(s) Oral two times a day    MEDICATIONS  (PRN):  HYDROmorphone  Injectable 1 milliGRAM(s) IV Push every 3 hours PRN Severe Pain (7 - 10)  oxyCODONE    IR 10 milliGRAM(s) Oral every 4 hours PRN Moderate Pain (4 - 6)  polyethylene glycol 3350 17 Gram(s) Oral daily PRN Constipation        Vital Signs Last 24 Hrs  T(C): 36.6 (28 Aug 2022 05:55), Max: 37.1 (27 Aug 2022 20:40)  T(F): 97.9 (28 Aug 2022 05:55), Max: 98.7 (27 Aug 2022 20:40)  HR: 93 (28 Aug 2022 05:55) (93 - 99)  BP: 95/67 (28 Aug 2022 05:55) (92/62 - 100/71)  BP(mean): --  RR: 18 (28 Aug 2022 05:55) (18 - 18)  SpO2: 97% (28 Aug 2022 06:45) (97% - 100%)    Parameters below as of 28 Aug 2022 06:45  Patient On (Oxygen Delivery Method): nasal cannula  O2 Flow (L/min): 2        PHYSICAL EXAM:     GENERAL:  Appears stated age, well-groomed  HEENT:  NC/AT,   CHEST:  CTA b/l  HEART:  S1 s2+   ABDOMEN:  + distended   SKIN:  No rash/erythema/ecchymoses  LN: No palpable Lymphadenopathy    NEURO:  Alert, oriented, no asterixis                            13.4   13.19 )-----------( 35       ( 28 Aug 2022 07:19 )             42.1       08-28    128<L>  |  88<L>  |  91<H>  ----------------------------<  116<H>  5.3   |  25  |  2.33<H>    Ca    8.9      28 Aug 2022 07:14    TPro  5.7<L>  /  Alb  2.7<L>  /  TBili  0.5  /  DBili  x   /  AST  63<H>  /  ALT  46<H>  /  AlkPhos  535<H>  08-28

## 2022-08-28 NOTE — PROGRESS NOTE ADULT - ASSESSMENT
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 Tito Valentin of [facility] s/p Gemcitabine (dose reduced) /Durvalumab LD 8/19/2022 presenting with hypotension 70's/60's,  noted to be hyponatremic (Na 127) and CHA with volume depletion.    Of note patient was recently admitted to [facility] for SOB s/p left thoracentesis and was noted to be hyponatremic on that admission and was DC'd on salt tabs 1g BID, 1L fluid restriction and Lasix 40 mg for LE edema.    Stage IV Ca Gallbladder  --Under care by Dr. Tito Valentin of [facility]  --S/P Gemcitabine (reduced dose)/Durvalumab (LD 8/19/2022)  He was offered hospice vs tx at [facility] in the past, pt wanted to give tx a try. At this time, he has multiple complications, would be a poor candidate for systemic tx if he does not improve. Per discussion with Dr Valentin, hospice appropriate    Ascites  --Profound abdominal ascites causing severe pain  --GI and IR eval appreciated, for paracentesis monday, plts 35K today , plan for para tomorrow.     Pulmonary Embolus  --Cannot anticoagulate at this time   --pending bilateral LE doppler  --vascular eval appreciated, IVC filter if with DVT but would depend on his GOC as well    Hyponatremia  --Recently treated at [facility]  --Had been on Na tabs and fluid restriction  --Na 128 today   --Treatment per primary team, nephrology    CHA  --renal following, related to mulple issues, fluid imblalance    Hypotension  --Most likely secondary to volume depletion as well  --Improved with IVF today   --on midodrine  --defer to renal, GI, specialists and primary team    Thrombocytopenia  -- improved today to 35  --Most likely secondary to chemotherapy  --monitor for now  --Please transfuse platelets if <10K or <50K with bleeding or prior to procedures  -- PTT low, not DIC      Bharat Schulte MD  HematologyOncology   O: [phone]

## 2022-08-28 NOTE — PROGRESS NOTE ADULT - PROBLEM SELECTOR PLAN 5
Pt is full code  Family meeting scheduled for tomorrow 2PM, pending confirmation by teams, pt and family want to have input from oncology as well  Will continue to follow  Page for uncontrolled symptoms [phone]

## 2022-08-28 NOTE — PROGRESS NOTE ADULT - ASSESSMENT
72 year old man with metastatic Gb cancer and malignant ascites, admitted with hypotension, hyponatremia and CHA.     1) malignant ascites    - tentatively planned for VIR guided paracentesis tomorrow, plts are > 30K  - pending [facility] discussion would consider a long-term peritoneal catheter, ie. Aspira drain    2) metastatis GB cancer  - per oncology     3) PE  unable to anticoagulate given thrombocytopenia     4) CHA  - nephrology on board    5) Leukocytosis   - per primary team     [facility]  Caty Segundo M.D  Gastroenterology and Hepatology  [address] [place], [place]  Office: [phone]  Cell: [phone]

## 2022-08-28 NOTE — PROGRESS NOTE ADULT - ASSESSMENT
72 year old Male with PMHx of gallbladder cancer with metastasis to the peritoneum, right pleura, liver, hyponatremia, HLD who presents to [facility] from outside laboratory for hypotension, CHA and hyponatremia. Palliative Care consulted for complex symptom management in the setting of advanced illness.

## 2022-08-28 NOTE — PROGRESS NOTE ADULT - SUBJECTIVE AND OBJECTIVE BOX
[facility] Division of Kidney Diseases & Hypertension  FOLLOW UP NOTE  [phone]--------------------------------------------------------------------------------  Chief Complaint:Hypotension  72 y.o M w/ hx of recent dx of gallbladder cancer with metastasis who presents to hospital for hypotension, found to have creatinine of 1.25, BUN (unknown baseline). Per daughter, patient diagnosed with gallbladder cancer at [facility] in July when he was undergoing a planned gall bladder removal. Patient has received 1 dose of gemcitabine/durvalumab with Dr. Tito Valentin, [facility]. Patient then had worsening SOB beginning this month, and to [facility] for SOB s/p left thoracentesis and placed on salt tabs 1g BID, 1L fluid restriction and lasix 40 mg for LE edema. Currently, he reports to having poor PO intake, eats ice cream and drinks lots of water and endorses polyuria. He is compliant with his medications.       24 hour events/subjective:  Worsening creatinine of 2.33 with Na of 128 likely in setting of compartment syndrome from his expanding ascites. Patient endorses a drop in his UOP and is having a hard time coughing  with full effort.      PAST HISTORY  --------------------------------------------------------------------------------  No significant changes to PMH, PSH, FHx, SHx, unless otherwise noted    ALLERGIES & MEDICATIONS  --------------------------------------------------------------------------------  Allergies    No Known Allergies    Intolerances      Standing Inpatient Medications  albumin human 25% IVPB 100 milliLiter(s) IV Intermittent every 12 hours  cefTRIAXone   IVPB 2000 milliGRAM(s) IV Intermittent every 24 hours  chlorhexidine 2% Cloths 1 Application(s) Topical daily  gabapentin 200 milliGRAM(s) Oral two times a day  midodrine. 20 milliGRAM(s) Oral three times a day  pantoprazole    Tablet 40 milliGRAM(s) Oral before breakfast  senna 2 Tablet(s) Oral at bedtime  sodium zirconium cyclosilicate 10 Gram(s) Oral once  urea Oral Powder 30 Gram(s) Oral two times a day    PRN Inpatient Medications  HYDROmorphone  Injectable 1 milliGRAM(s) IV Push every 3 hours PRN  oxyCODONE    IR 10 milliGRAM(s) Oral every 4 hours PRN  polyethylene glycol 3350 17 Gram(s) Oral daily PRN      REVIEW OF SYSTEMS  --------------------------------------------------------------------------------  Gen: No fevers/chills +fatigue, poor oral intake   Skin: No rashes  Head/Eyes/Ears: Normal hearing,   Respiratory: No dyspnea, +cough  CV: No chest pain  GI: + abdominal pain, diarrhea  : No dysuria, hematuria  MSK: No  edema  Heme: No easy bruising or bleeding  Psych: No significant depression    All other systems were reviewed and are negative, except as noted.      VITALS/PHYSICAL EXAM  --------------------------------------------------------------------------------  T(C): 36.6 (08-28-22 @ 05:55), Max: 37.1 (08-27-22 @ 20:40)  HR: 93 (08-28-22 @ 05:55) (93 - 99)  BP: 95/67 (08-28-22 @ 05:55) (92/62 - 100/71)  RR: 18 (08-28-22 @ 05:55) (18 - 18)  SpO2: 97% (08-28-22 @ 06:45) (97% - 100%)  Wt(kg): --  Height (cm): 182.9 (08-26-22 @ 11:38)  Weight (kg): 110 (08-27-22 @ 08:27)  BMI (kg/m2): 32.9 (08-27-22 @ 08:27)  BSA (m2): 2.31 (08-27-22 @ 08:27)      08-27-22 @ 07:01  -  08-28-22 @ 07:00  --------------------------------------------------------  IN: 0 mL / OUT: 405 mL / NET: -405 mL      Physical Exam:  	Gen: NAD, well-appearing  	HEENT: PERRL, supple neck, clear oropharynx  	Pulm: Diminished airflow in bibasilar lungs  	CV: RRR, S1S2;  	Back: No spinal or CVA tenderness  	Abd: +BS, soft, nontender/nondistended  	: No suprapubic tenderness                      Extremities:+2 pitting edema                       Neuro: No focal deficits, intact gait  	Skin: Warm, without rashes      LABS/STUDIES  --------------------------------------------------------------------------------              13.4   13.19 >-----------<  35       [08-28-22 @ 07:19]              42.1     128  |  88  |  91  ----------------------------<  116      [08-28-22 @ 07:14]  5.3   |  25  |  2.33        Ca     8.9     [08-28-22 @ 07:14]    TPro  5.7  /  Alb  2.7  /  TBili  0.5  /  DBili  x   /  AST  63  /  ALT  46  /  AlkPhos  535  [08-28-22 @ 07:14]    PT/INR: PT 15.2 , INR 1.31       [08-27-22 @ 07:18]  PTT: 23.5       [08-27-22 @ 07:18]    Serum Osmolality 283      [08-27-22 @ 07:21]    Creatinine Trend:  SCr 2.33 [08-28 @ 07:14]  SCr 1.70 [08-27 @ 07:09]  SCr 1.19 [08-26 @ 19:15]  SCr 1.25 [08-26 @ 13:06]    Urinalysis - [08-27-22 @ 07:20]      Color Yellow / Appearance Slightly Turbid / SG 1.038 / pH 5.5      Gluc Negative / Ketone Negative  / Bili Negative / Urobili Negative       Blood Small / Protein 30 mg/dL / Leuk Est Negative / Nitrite Negative      RBC 5 / WBC 15 / Hyaline 16 / Gran  / Sq Epi  / Non Sq Epi 14 / Bacteria Negative    Urine Creatinine 120      [08-27-22 @ 16:26]  Urine Protein 95      [08-27-22 @ 16:47]  Urine Sodium 8      [08-27-22 @ 07:09]  Urine Osmolality 452      [08-27-22 @ 07:09]    TSH 1.80      [08-27-22 @ 07:22]  Lipid: chol 98, , HDL 37, LDL --      [08-27-22 @ 07:21]    HCV 0.06, Nonreact      [08-27-22 @ 07:22]

## 2022-08-28 NOTE — PROGRESS NOTE ADULT - PROBLEM SELECTOR PLAN 1
Patient with hyponatremia of 127 on presentation likely volume overloaded vs polydipsia with concurrent polyuria vs SIADH. Per daughter, was 127 at [facility] earlier this month and patient has been drinking lots of water as he feels very thirsty.    Sodium improved slightly to 128 from presentation. Please continue with  fluid restriction to <1L. Please c/w Urena 30 mg BID for now. C/w albumin BID for now. Consider paracentesis with concurrent 6-8g of albumin/Liter removed. It will help with hemodynamics and prevent intravascular volume depletion. Please obtain Urinalysis, Urine lytes, and Uprot/creat. Please order TSH and Cortisol.  Avoid further volume resuscitation as patient is very volume overloaded. Hold diuretics for now as low BP Patient with hyponatremia of 127 on presentation likely volume overloaded vs polydipsia with concurrent polyuria vs SIADH. Per daughter, was 127 at [facility] earlier this month and patient has been drinking lots of water as he feels very thirsty.    Sodium improved slightly to 128 from presentation. Please continue with  fluid restriction to <1L. Will dc Urena 30 mg BID for now bc of worsening CHA. C/w albumin BID for now. Consider paracentesis with concurrent 6-8g of albumin/Liter removed. It will help with hemodynamics and prevent intravascular volume depletion. Please obtain Urinalysis, Urine lytes, and Uprot/creat. Please order TSH and Cortisol.  Avoid further volume resuscitation as patient is very volume overloaded. Hold diuretics for now as low BP

## 2022-08-29 NOTE — DIETITIAN INITIAL EVALUATION ADULT - PERTINENT LABORATORY DATA
08-29    127<L>  |  86<L>  |  105<H>  ----------------------------<  88  5.1   |  27  |  2.37<H>    Ca    8.9      29 Aug 2022 07:46    TPro  5.7<L>  /  Alb  3.2<L>  /  TBili  0.5  /  DBili  x   /  AST  48<H>  /  ALT  35  /  AlkPhos  501<H>  08-29 08-29 @ 07:46: Na 127<L>, <H>, Cr 2.37<H>, BG 88, K+ 5.1, Phos --, Mg --, Alk Phos 501<H>, ALT/SGPT 35, AST/SGOT 48<H>, HbA1c --    A1C with Estimated Average Glucose Result: 6.9 % (08-27-22 @ 07:20)

## 2022-08-29 NOTE — PROGRESS NOTE ADULT - PROBLEM SELECTOR PLAN 5
CT A/P with peritoneal carcinomatosis with moderate to large volume ascites  -S/p LLQ paracentesis in IR 8/29, 4.9 L removed  -Empiric ABX per primary team.

## 2022-08-29 NOTE — DIETITIAN INITIAL EVALUATION ADULT - ENTER FROM (G/KG)
Include Z78.9 (Other Specified Conditions Influencing Health Status) As An Associated Diagnosis?: No Kenalog Preparation: Kenalog Total Volume Injected (Ccs- Only Use Numbers And Decimals): 2 Concentration Of Solution Injected (Mg/Ml): 0.5 Detail Level: Zone X Size Of Lesion In Cm (Optional): 0 Consent: The risks of atrophy were reviewed with the patient. Medical Necessity Clause: This procedure was medically necessary because the lesions that were treated were: 1.1

## 2022-08-29 NOTE — PROGRESS NOTE ADULT - ASSESSMENT
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 Tito Valentin of [facility] [place] s/p Gemcitabine (dose reduced) /Durvalumab LD 8/19/2022 presenting with hypotension 70's/60's,  noted to be hyponatremic (Na 127) and CHA with volume depletion.    Of note patient was recently admitted to [facility] for SOB s/p left thoracentesis and was noted to be hyponatremic on that admission and was DC'd on salt tabs 1g BID, 1L fluid restriction and Lasix 40 mg for LE edema.    Stage IV Ca Gallbladder  --Under care by Dr. Tito Valentin of [facility]  --S/P Gemcitabine (reduced dose)/Durvalumab (LD 8/19/2022)  --He was offered hospice vs tx at [facility] in the past, pt wanted to give tx a try. At this time, he has multiple complications, would be a poor candidate for systemic tx if he does not improve. --Per discussion with Dr Valentin, hospice appropriate  --Palliative care on board for symptom management; planning family meeting for [facility]    Ascites  --Profound abdominal ascites causing severe pain  --Having paracentesis today 8/29 with IR  --On Rocephin ppx with concern for SBP    Pulmonary Embolus  --Cannot anticoagulate at this time given thrombocytopenia  --pending bilateral LE doppler  --vascular eval appreciated, IVC filter if with DVT but would depend on his GO as well    Hyponatremia  --Recently treated at [facility]  --Had been on Na tabs and fluid restriction  --Na 128 today   --Treatment per primary team, nephrology    CHA  --renal following, related to multiple issues, fluid imbalance    Hypotension  --Most likely secondary to volume depletion as well  --Improved with IVF   --on midodrine  --defer to renal, GI, specialists and primary team    Thrombocytopenia  -- improved today to 35  --Most likely secondary to chemotherapy/liver disease  --monitor for now  --Please transfuse platelets if <10K or <50K with bleeding or prior to procedures  -- PTT low, not DIC    We will continue to follow patient and coordinate with [facility]    José Arambula PA-C  Hematology/Oncology  [facility]   [phone] (office)  [phone] (alt office)  Evenings and weekends please call MD on call or office

## 2022-08-29 NOTE — PROGRESS NOTE ADULT - ASSESSMENT
71 y/o M with PMH of metastatic gallbladder CA (with mets to peritoneum, pleura, liver), hyponatremia, HLD. Presents to ED from [facility] for hypotension (reported SBP 70s), CHA, hyponatremia. Also admits to abdominal distention. Noted to have recent hospital admission at [facility] (3 weeks ago per patient, s/p L thoracentesis). CT C/A/P with moderate R and small L pl effusions, scattered pulm nodules with concern for lymphangitic spread, scattered liver & pancreatic lesions, peritoneal carcinomatosis with moderate to large volume ascites Also with incidental finding of L lower lobar and segmental PE. Pulmonary called to consult for pleural effusions, PE.

## 2022-08-29 NOTE — PROGRESS NOTE ADULT - SUBJECTIVE AND OBJECTIVE BOX
[facility] Division of Kidney Diseases & Hypertension  FOLLOW UP NOTE  [phone]--------------------------------------------------------------------------------  Chief Complaint:Hypotension    72 y.o M w/ hx of recent dx of gallbladder cancer with metastasis who presents to hospital for hypotension, found to have creatinine of 1.25, BUN (unknown baseline). Per daughter, patient diagnosed with gallbladder cancer at [facility] in July when he was undergoing a planned gall bladder removal. Patient has received 1 dose of gemcitabine/durvalumab with Dr. Tito Valentin, [facility]. Patient then had worsening SOB beginning this month, and to [facility] for SOB s/p left thoracentesis and placed on salt tabs 1g BID, 1L fluid restriction and lasix 40 mg for LE edema. Currently, he reports to having poor PO intake, eats ice cream and drinks lots of water and endorses polyuria. He is compliant with his medications.         24 hour events/subjective:  Patient with worsening creatinine, on midodrine and albumin. Planned for albumin tap today      PAST HISTORY  --------------------------------------------------------------------------------  No significant changes to PMH, PSH, FHx, SHx, unless otherwise noted    ALLERGIES & MEDICATIONS  --------------------------------------------------------------------------------  Allergies    No Known Allergies    Intolerances      Standing Inpatient Medications  albumin human 25% IVPB 100 milliLiter(s) IV Intermittent every 12 hours  cefTRIAXone   IVPB 2000 milliGRAM(s) IV Intermittent every 24 hours  chlorhexidine 2% Cloths 1 Application(s) Topical daily  gabapentin 200 milliGRAM(s) Oral two times a day  midodrine. 20 milliGRAM(s) Oral three times a day  pantoprazole    Tablet 40 milliGRAM(s) Oral before breakfast  senna 2 Tablet(s) Oral at bedtime    PRN Inpatient Medications  HYDROmorphone  Injectable 1 milliGRAM(s) IV Push every 3 hours PRN  oxyCODONE    IR 10 milliGRAM(s) Oral every 4 hours PRN  polyethylene glycol 3350 17 Gram(s) Oral daily PRN      REVIEW OF SYSTEMS  --------------------------------------------------------------------------------  Gen: No fevers/chills +fatigue, poor oral intake   Skin: No rashes  Head/Eyes/Ears: Normal hearing,   Respiratory: No dyspnea, +cough  CV: No chest pain  GI: + abdominal pain, diarrhea  : No dysuria, hematuria  MSK: No  edema  Heme: No easy bruising or bleeding  Psych: No significant depression        All other systems were reviewed and are negative, except as noted.    VITALS/PHYSICAL EXAM  --------------------------------------------------------------------------------  T(C): 36.9 (08-29-22 @ 10:34), Max: 36.9 (08-29-22 @ 10:31)  HR: 98 (08-29-22 @ 10:34) (88 - 98)  BP: 100/67 (08-29-22 @ 10:34) (96/66 - 116/74)  RR: 18 (08-29-22 @ 10:34) (18 - 18)  SpO2: 98% (08-29-22 @ 10:34) (93% - 99%)  Wt(kg): --  Height (cm): 182.9 (08-29-22 @ 10:34)  Weight (kg): 110 (08-29-22 @ 10:34)  BMI (kg/m2): 32.9 (08-29-22 @ 10:34)  BSA (m2): 2.31 (08-29-22 @ 10:34)      08-28-22 @ 07:01  -  08-29-22 @ 07:00  --------------------------------------------------------  IN: 462 mL / OUT: 1000 mL / NET: -538 mL    08-29-22 @ 07:01  -  08-29-22 @ 11:37  --------------------------------------------------------  IN: 240 mL / OUT: 0 mL / NET: 240 mL      Physical Exam:  	Gen: NAD, well-appearing  	HEENT: PERRL, supple neck, clear oropharynx  	Pulm: Diminished airflow in bibasilar lungs  	CV: RRR, S1S2;  	Back: No spinal or CVA tenderness  	Abd: +BS, soft, nontender/nondistended  	: No suprapubic tenderness                      Extremities:+2 pitting edema                       Neuro: No focal deficits, intact gait  	Skin: Warm, without rashes      LABS/STUDIES  --------------------------------------------------------------------------------              12.6   12.97 >-----------<  34       [08-29-22 @ 07:43]              38.6     127  |  86  |  105  ----------------------------<  88      [08-29-22 @ 07:46]  5.1   |  27  |  2.37        Ca     8.9     [08-29-22 @ 07:46]    TPro  5.7  /  Alb  3.2  /  TBili  0.5  /  DBili  x   /  AST  48  /  ALT  35  /  AlkPhos  501  [08-29-22 @ 07:46]          Creatinine Trend:  SCr 2.37 [08-29 @ 07:46]  SCr 2.33 [08-28 @ 07:14]  SCr 1.70 [08-27 @ 07:09]  SCr 1.19 [08-26 @ 19:15]  SCr 1.25 [08-26 @ 13:06]    Urinalysis - [08-27-22 @ 07:20]      Color Yellow / Appearance Slightly Turbid / SG 1.038 / pH 5.5      Gluc Negative / Ketone Negative  / Bili Negative / Urobili Negative       Blood Small / Protein 30 mg/dL / Leuk Est Negative / Nitrite Negative      RBC 5 / WBC 15 / Hyaline 16 / Gran  / Sq Epi  / Non Sq Epi 14 / Bacteria Negative    Urine Creatinine 88      [08-28-22 @ 14:29]  Urine Protein 95      [08-27-22 @ 16:47]  Urine Sodium 8      [08-28-22 @ 14:29]  Urine Potassium 58      [08-28-22 @ 14:29]  Urine Chloride <20      [08-28-22 @ 14:29]  Urine Phosphorus 49.9      [08-28-22 @ 14:28]  Urine Osmolality 423      [08-28-22 @ 14:29]    TSH 1.80      [08-27-22 @ 07:22]  Lipid: chol 98, , HDL 37, LDL --      [08-27-22 @ 07:21]    HCV 0.06, Nonreact      [08-27-22 @ 07:22]

## 2022-08-29 NOTE — PROGRESS NOTE ADULT - SUBJECTIVE AND OBJECTIVE BOX
Patient is a 72y old  Male who presents with a chief complaint of Hyponatremia and CHA (30 Aug 2022 06:47)      SUBJECTIVE / OVERNIGHT EVENTS:    pt off floor for paracentesis.      Vital Signs Last 24 Hrs  T(C): 36.4 (30 Aug 2022 05:16), Max: 36.9 (29 Aug 2022 10:31)  T(F): 97.6 (30 Aug 2022 05:16), Max: 98.4 (29 Aug 2022 10:31)  HR: 88 (30 Aug 2022 05:16) (88 - 98)  BP: 98/62 (30 Aug 2022 05:16) (94/62 - 103/62)  BP(mean): --  RR: 18 (30 Aug 2022 05:16) (18 - 18)  SpO2: 98% (30 Aug 2022 05:16) (97% - 98%)    Parameters below as of 30 Aug 2022 05:16  Patient On (Oxygen Delivery Method): nasal cannula      I&O's Summary    29 Aug 2022 07:01  -  30 Aug 2022 07:00  --------------------------------------------------------  IN: 360 mL / OUT: 2700 mL / NET: -2340 mL        LABS:                        12.6   12.97 )-----------( 34       ( 29 Aug 2022 07:43 )             38.6     08-29    127<L>  |  86<L>  |  105<H>  ----------------------------<  88  5.1   |  27  |  2.37<H>    Ca    8.9      29 Aug 2022 07:46    TPro  5.7<L>  /  Alb  3.2<L>  /  TBili  0.5  /  DBili  x   /  AST  48<H>  /  ALT  35  /  AlkPhos  501<H>  08-29      CAPILLARY BLOOD GLUCOSE                RADIOLOGY & ADDITIONAL TESTS:    Imaging Personally Reviewed:  [x] YES  [ ] NO    Consultant(s) Notes Reviewed:  [x] YES  [ ] NO    MEDICATIONS  (STANDING):  albumin human 25% IVPB 100 milliLiter(s) IV Intermittent every 12 hours  cefTRIAXone   IVPB 2000 milliGRAM(s) IV Intermittent every 24 hours  chlorhexidine 2% Cloths 1 Application(s) Topical daily  gabapentin 200 milliGRAM(s) Oral two times a day  midodrine. 20 milliGRAM(s) Oral three times a day  pantoprazole    Tablet 40 milliGRAM(s) Oral before breakfast  senna 2 Tablet(s) Oral at bedtime    MEDICATIONS  (PRN):  HYDROmorphone  Injectable 1 milliGRAM(s) IV Push every 3 hours PRN Severe Pain (7 - 10)  oxyCODONE    IR 10 milliGRAM(s) Oral every 4 hours PRN Moderate Pain (4 - 6)  polyethylene glycol 3350 17 Gram(s) Oral daily PRN Constipation      Care Discussed with Consultants/Other Providers [x] YES  [ ] NO    HEALTH ISSUES - PROBLEM Dx:  Hyponatremia    Suspected pulmonary embolism    Suspected deep vein thrombosis (DVT)    Pain due to neoplasm    Gallbladder cancer    Weakness    Palliative care encounter    Pleural effusion    Pulmonary embolism    Thrombocytopenia    Ascites    Shortness of breath    Constipation    CHA (acute kidney injury)

## 2022-08-29 NOTE — PROGRESS NOTE ADULT - ASSESSMENT
72 year old man with metastatic Gb cancer and malignant ascites, admitted with hypotension, hyponatremia and CHA.     1) malignant ascites    - planned for VIR guided paracentesis today, plts are > 30K  - pending [facility] discussion would consider a long-term peritoneal catheter, ie. Aspira drain    2) metastatis GB cancer  - per oncology     3) PE  - unable to anticoagulate given thrombocytopenia     4) CHA  - nephrology on board    5) Leukocytosis   - per primary team         Attending supervision statement: I have personally seen and examined the patient. I fully participated in the care of this patient. I have made amendments to the documentation where necessary, and agree with the history, physical exam, and plan as outlined by the ACP.    [facility]  Gastroenterology and Hepatology  [address] [place], [place]  Office: [phone]  Cell: [phone]

## 2022-08-29 NOTE — DIETITIAN INITIAL EVALUATION ADULT - OTHER INFO
Home Medications:  allopurinol 300 mg oral tablet: 1 tab(s) orally once a day (29 Aug 2022 13:41)  clotrimazole 10 mg oral lozenge: 1 lozenge orally 5 times a day, As Needed (29 Aug 2022 13:41)  furosemide 40 mg oral tablet: 1 tab(s) orally once a day (29 Aug 2022 13:41)  gabapentin 100 mg oral capsule: 2 cap(s) orally 2 times a day (29 Aug 2022 13:41)  lactulose 10 g/15 mL oral syrup: 30 milliliter(s) orally once a day, As Needed (29 Aug 2022 13:41)  meloxicam 15 mg oral tablet: 1 tab(s) orally once a day (29 Aug 2022 13:41)  nystatin 100,000 units/mL oral suspension: 10 milliliter(s) orally every 6 hours, As Needed (29 Aug 2022 13:41)  omeprazole 40 mg oral delayed release capsule: 1 cap(s) orally once a day (29 Aug 2022 13:41)  ondansetron 8 mg oral tablet, disintegratin tab(s) orally 3 times a day (29 Aug 2022 13:41)  rosuvastatin 20 mg oral tablet: 1 tab(s) orally once a day (29 Aug 2022 13:41)  Sodium Chloride 1 g oral tablet: 1 tab(s) orally once a day (29 Aug 2022 13:41)  traZODone 100 mg oral tablet: 1 tab(s) orally once a day (at bedtime) (29 Aug 2022 13:41)

## 2022-08-29 NOTE — DIETITIAN INITIAL EVALUATION ADULT - ORAL INTAKE PTA/DIET HISTORY
Pt endorses decreased appetite and PO intake PTA, "2-3 months... I lost wt, was ~262 pounds now ~152 pounds" (4% in 2-3 months).   Pt reports not following specific diet/ diet restrictions PTA and confirmed no known food allergies/ food intolerances

## 2022-08-29 NOTE — PROGRESS NOTE ADULT - PROBLEM SELECTOR PLAN 1
Patient with hyponatremia of 127 on presentation likely volume overloaded vs polydipsia with concurrent polyuria vs SIADH. Per daughter, was 127 at [facility] earlier this month and patient has been drinking lots of water  at home    Sodium improved slightly and has been stable ~128 from presentation. Please continue with  fluid restriction to <1L.  C/w albumin BID for now. Paracentesis with concurrent albumin unit in 1:1 ratio. It will help with hemodynamics and prevent intravascular volume depletion. Please obtain Urinalysis, Urine lytes, and Uprot/creat. Please order TSH and Cortisol.  Avoid further volume resuscitation as patient is very volume overloaded. Hold diuretics for now as low BP Patient with hyponatremia of 127 on presentation likely volume overloaded vs SIADH. Per daughter, was 127 at [facility] earlier this month and patient has been drinking lots of water at home    Sodium improved slightly and has been stable ~128 from presentation. Please continue with  fluid restriction to <1L.  C/w albumin BID for now. Paracentesis with concurrent albumin unit in 1:1 ratio. It will help with hemodynamics and prevent intravascular volume depletion. Please obtain Urinalysis, Urine lytes, and Uprot/creat. Please order TSH and Cortisol.  Avoid further volume resuscitation as patient is very volume overloaded. Hold diuretics for now as low BP

## 2022-08-29 NOTE — PHARMACOTHERAPY INTERVENTION NOTE - COMMENTS
Confirmed home medications with patient's wife and [facility] pharmacy, updated in Outpatient Medication Review.    Added:  allopurinol 300 mg oral tablet: 1 tab(s) orally once a day  clotrimazole 10 mg oral lozenge: 1 lozenge orally 5 times a day, As Needed  furosemide 40 mg oral tablet: 1 tab(s) orally once a day  gabapentin 100 mg oral capsule: 2 cap(s) orally 2 times a day  lactulose 10 g/15 mL oral syrup: 30 milliliter(s) orally once a day, As Needed  meloxicam 15 mg oral tablet: 1 tab(s) orally once a day   nystatin 100,000 units/mL oral suspension: 10 milliliter(s) orally every 6 hours, As Needed   omeprazole 40 mg oral delayed release capsule: 1 cap(s) orally once a day  ondansetron 8 mg oral tablet, disintegratin tab(s) orally 3 times a day   rosuvastatin 20 mg oral tablet: 1 tab(s) orally once a day  Sodium Chloride 1 g oral tablet: 1 tab(s) orally once a day   traZODone 100 mg oral tablet: 1 tab(s) orally once a day (at bedtime)     Dominick LondonoD  PGY1 Pharmacy Resident  Available on [place]  [id number]

## 2022-08-29 NOTE — PROGRESS NOTE ADULT - ASSESSMENT
73yo M PMHx gallbladder cancer with metastasis to the peritoneum, right pleura, liver, hyponatremia who presents to [facility] from outside laboratory for hypotension, CHA and hyponatremia. Patient admits to abdominal distention, and was recently admitted for a thoracentesis.      # Hyponatremia  # hypotension  # ascites  # CHA  volume overload vs SIADH  Salt tablets on hold  Diet with fluid restriction to 1 L    Lasix on hold in view of Hyponatremia, CHA and CT with contrast  appreciate renal recs  cont midodrine  sp paracentesis 8/29  C/w Rocephin for prophylaxis for now    # acute Pulmonary Embolism & DVT  Unable to AC in view of thrombocytopenia & malignancy  TTE  IR consult for IVC filter  vascular cardiology evaluted    # Stage IV Ca Gallbladder diffuse mets  # Thrombocytopenia Likely due to Chemotherapy   # transaminitis  outpt following Dr. Tito Valentin of [facility]  S/P Gemcitabine /Durvalumab (LD 8/19/2022)  heme/Onc following  appreciate palliative care discussion with family - would like to pursue all measures, full code  Platelets were reported at 27K at [facility] 08/26  transfuse platelets if <10K or <50K with bleeding or prior to procedures --> Per IR, goal of Platelets is > 30K for paracentesis   statin on hold    Hold off on pharm DVT PPX for now    PCP Dr. Patricia Goddard & Dr Davies    [facility] [phone].

## 2022-08-29 NOTE — DIETITIAN INITIAL EVALUATION ADULT - NSFNSGIIOFT_GEN_A_CORE
- Pt denies nausea, vomiting, diarrhea, or constipation at this time   - Last BM:?unclear, pt reports 1 month ago? not recorded in chart, monitor. noted ordered for miralax

## 2022-08-29 NOTE — PROGRESS NOTE ADULT - PROBLEM SELECTOR PLAN 1
Left lower lobar and segmental PE - incidentally found on CT C/A/P with IV contrast  -Likely provoked in the setting of malignancy   -Troponins, proBNP elevated  -Off full dose AC in the setting of thrombocytopenia. CT also with concern for L intramuscular hematoma  -F/u LE duplex  -F/u TTE  -Vascular cardiology f/u  -Heme/onc f/u.

## 2022-08-29 NOTE — PROGRESS NOTE ADULT - SUBJECTIVE AND OBJECTIVE BOX
Follow-up Pulm Progress Note    S/p paracentesis this AM, 4.9 L removed  Denies CP, SOB  O2 sats 98% on 3LNC, lowered to 2LNC now    Medications:  MEDICATIONS  (STANDING):  albumin human 25% IVPB 100 milliLiter(s) IV Intermittent every 12 hours  cefTRIAXone   IVPB 2000 milliGRAM(s) IV Intermittent every 24 hours  chlorhexidine 2% Cloths 1 Application(s) Topical daily  gabapentin 200 milliGRAM(s) Oral two times a day  midodrine. 20 milliGRAM(s) Oral three times a day  pantoprazole    Tablet 40 milliGRAM(s) Oral before breakfast  senna 2 Tablet(s) Oral at bedtime    MEDICATIONS  (PRN):  HYDROmorphone  Injectable 1 milliGRAM(s) IV Push every 3 hours PRN Severe Pain (7 - 10)  oxyCODONE    IR 10 milliGRAM(s) Oral every 4 hours PRN Moderate Pain (4 - 6)  polyethylene glycol 3350 17 Gram(s) Oral daily PRN Constipation    Vital Signs Last 24 Hrs  T(C): 36.7 (29 Aug 2022 13:23), Max: 36.9 (29 Aug 2022 10:31)  T(F): 98 (29 Aug 2022 13:23), Max: 98.4 (29 Aug 2022 10:31)  HR: 94 (29 Aug 2022 13:23) (88 - 98)  BP: 94/62 (29 Aug 2022 13:23) (94/62 - 116/74)  BP(mean): --  RR: 18 (29 Aug 2022 13:23) (18 - 18)  SpO2: 98% (29 Aug 2022 13:23) (97% - 99%)    Parameters below as of 29 Aug 2022 13:23  Patient On (Oxygen Delivery Method): nasal cannula  O2 Flow (L/min): 2      08-28 @ 07:01  -  08-29 @ 07:00  --------------------------------------------------------  IN: 462 mL / OUT: 1000 mL / NET: -538 mL    LABS:                        12.6   12.97 )-----------( 34       ( 29 Aug 2022 07:43 )             38.6     08-29    127<L>  |  86<L>  |  105<H>  ----------------------------<  88  5.1   |  27  |  2.37<H>    Ca    8.9      29 Aug 2022 07:46    TPro  5.7<L>  /  Alb  3.2<L>  /  TBili  0.5  /  DBili  x   /  AST  48<H>  /  ALT  35  /  AlkPhos  501<H>  08-29      Serum Pro-Brain Natriuretic Peptide: 1894 pg/mL (08-28-22 @ 07:14)    CULTURES    Culture - Blood (collected 08-27-22 @ 01:55)  Source: .Blood Blood-Peripheral  Preliminary Report (08-28-22 @ 04:02):    No growth to date.    Culture - Blood (collected 08-27-22 @ 01:40)  Source: .Blood Blood-Peripheral  Preliminary Report (08-28-22 @ 04:02):    No growth to date.    Culture - Urine (collected 08-26-22 @ 17:07)  Source: Clean Catch Clean Catch (Midstream)  Final Report (08-27-22 @ 23:41):    <10,000 CFU/mL Normal Urogenital Eliana    Physical Examination:  PULM: Decreased BS at bases  CVS: RRR    RADIOLOGY REVIEWED  CT chest:< from: CT Chest w/ IV Cont (08.26.22 @ 14:43) >    FINDINGS:  Streak artifact related the patient's upper extremities limits portions   of the exam, most prominently within the abdomen.    CHEST:  LUNGS, PLEURA, AND LARGE AIRWAYS: Patent central airways. Moderate right   and small left pleural effusions with associated compressive atelectasis.   Scattered bilateral pulmonary nodules, with example in the left upper   lobe measuring 4 mm (series 3 image 124), and example in the right upper   lobe measuring 4 mm (series 3 image 155). Mild interlobular septal   thickening in the right lung, which appears somewhat irregular, possibly   representing lymphangitic spread of disease.  VESSELS: Right chest wall port with catheter tip in the right atrium.   Atherosclerotic changes. Filling defect within the left lower lobar and   segmental pulmonary arteries, concerning for pulmonary embolism (series   601 image 85).  HEART: Heart size is normal. Mitral annular calcification. No pericardial   effusion.  MEDIASTINUM AND DARYL: No lymphadenopathy.  CHEST WALL AND LOWER NECK: Enlarged heterogeneous thyroid gland with   mixed density left thyroid nodule measuring approximate 2.4 cm (series 2  image 2).    ABDOMEN AND PELVIS:    LIVER: Scattered indeterminate hypodense liver lesions, with a larger   example at the dome measuring 4.0 cm. Another example measuring   approximately 2.6 cm at the posterior dome has an associated coarse   calcification.  BILE DUCTS: Normal caliber.  GALLBLADDER: Hyperdensity within the gallbladder may represent sludge, or   possibly the patient's known malignancy.  SPLEEN: Within normal limits.  PANCREAS: Ill-defined hypoattenuating lesion along the pancreatic tail   measuring approximately 3.8 x 2.1 cm (series 2 image 75). Abnormal tissue   extends into the splenic hilum with likely encasement of splenic vessels   and abuts the adjacent stomach.  ADRENALS: Within normal limits.  KIDNEYS/URETERS: A fewpunctate nonobstructing calculi in the upper pole   of the right kidney. No hydronephrosis.    Portions of the pelvis are obscured by streak artifact from a left hip   prosthesis.    BLADDER: Partially obscured by streak artifact; grossly within normal   limits.  REPRODUCTIVE ORGANS: Partially obscured by streak artifact. The prostate   is normal in size.    BOWEL: Linear metallic density measuring approximately 2 cm in length at   the gastric fundus. Colonic diverticulosis. No bowel obstruction.  PERITONEUM: Moderate to large volume ascites. Infiltration of the   peritoneum with appearance of omental caking. Sites of irregular   peritoneal thickening with nodularity, with example in the left lower   quadrant (series 2 image 132).  VESSELS: Atherosclerotic changes.  RETROPERITONEUM/LYMPH NODES: No lymphadenopathy.  ABDOMINAL WALL: Small fat-containing left inguinal hernia. Ovoid   thickening of the left ventral abdominal wall musculature measuring 2.3 x   3.3 x 3.7 cm (series 2 image 127). Subcutaneous infiltration in the upper   ventral abdominal wall.  BONES: Left hip arthroplasty. Degenerative changes. Grade 1   anterolisthesis of L4 over L5. At least moderate central canal narrowing   at L4-L5. Calcified bodies extending inferiorly below the left   glenohumeral joint. Calcified body adjacent to the right glenohumeral   joint.    IMPRESSION:    CHEST:  *  Pulmonary embolism within left lower lobar and segmental pulmonary   arteries.  *  Moderate right and small left pleural effusions.  *  Scattered nonspecific subcentimeter pulmonary nodules. Irregular   interlobular septal thickening in the right lung, possibly representing   lymphangitic spread of disease.  *  Enlarged heterogeneous thyroid gland with a mixed density leftthyroid   lobe nodule measuring 2.4 cm.    ABDOMEN AND PELVIS:  *  Scattered indeterminate liver lesions, presumably the patient's known   metastatic disease.  *  Findings consistent with known peritoneal carcinomatosis. Moderate to   large volume ascites.  *  Ill-defined lesion along the pancreatic tail, possibly reflecting   metastatic disease, with a primary pancreatic neoplasm not excluded.  *  Ovoid thickening of the left ventral abdominal musculature measuring   2.3 x 3.3 x 3.7 cm, potentially representing an implant, or possibly an   intramuscular hematoma.  *  Linear metallic density in the gastric fundus, possibly an endoclip.   Correlate with patient history.      Findings of pulmonary embolism were discussed with Dr. Argueta  8/26/2022   3:49 PM by Dr. Staley with read back confirmation.      < end of copied text >

## 2022-08-29 NOTE — PROGRESS NOTE ADULT - ASSESSMENT
72 year old Male with PMHx of gallbladder cancer with metastasis to the peritoneum, right pleura, liver, hyponatremia, HLD who presents to [facility] from outside laboratory for hypotension, CHA and hyponatremia. Palliative Care consulted for complex symptom management in the setting of advanced illness.

## 2022-08-29 NOTE — PROGRESS NOTE ADULT - SUBJECTIVE AND OBJECTIVE BOX
Indication for Geriatrics and Palliative Care Services/INTERVAL HPI: symptom management and GOC           08-29-22 @ 15:02  [facility] [id number] D1    Overnight events: No major events  Staff reports: s/p paracentesis today  Patient: Wife reports relief post para.  PRN use: IV dilaudid X 1 oxycodone X 1        RECENT VITALS/LABS/MEDICATIONS/ASSAYS     08-29-22 @ 15:02    T(C): 36.7 (08-29-22 @ 13:23), Max: 36.9 (08-29-22 @ 10:31)  HR: 94 (08-29-22 @ 13:23) (88 - 98)  BP: 94/62 (08-29-22 @ 13:23) (94/62 - 116/74)  RR: 18 (08-29-22 @ 13:23) (18 - 18)  SpO2: 98% (08-29-22 @ 13:23) (97% - 99%)  Wt(kg): --      28 Aug 2022 07:01  -  29 Aug 2022 07:00  --------------------------------------------------------  IN:    Oral Fluid: 462 mL  Total IN: 462 mL    OUT:    Indwelling Catheter - Urethral (mL): 1000 mL  Total OUT: 1000 mL    Total NET: -538 mL      29 Aug 2022 07:01  -  29 Aug 2022 15:02  --------------------------------------------------------  IN:    Oral Fluid: 240 mL  Total IN: 240 mL    OUT:  Total OUT: 0 mL    Total NET: 240 mL          08-28 @ 07:01  -  08-29 @ 07:00  --------------------------------------------------------  IN: 462 mL / OUT: 1000 mL / NET: -538 mL    08-29 @ 07:01  -  08-29 @ 15:02  --------------------------------------------------------  IN: 240 mL / OUT: 0 mL / NET: 240 mL      CAPILLARY BLOOD GLUCOSE            albumin human 25% IVPB 100 milliLiter(s) IV Intermittent every 12 hours  cefTRIAXone   IVPB 2000 milliGRAM(s) IV Intermittent every 24 hours  chlorhexidine 2% Cloths 1 Application(s) Topical daily  gabapentin 200 milliGRAM(s) Oral two times a day  HYDROmorphone  Injectable 1 milliGRAM(s) IV Push every 3 hours PRN  midodrine. 20 milliGRAM(s) Oral three times a day  oxyCODONE    IR 10 milliGRAM(s) Oral every 4 hours PRN  pantoprazole    Tablet 40 milliGRAM(s) Oral before breakfast  polyethylene glycol 3350 17 Gram(s) Oral daily PRN  senna 2 Tablet(s) Oral at bedtime          08-29    127<L>  |  86<L>  |  105<H>  ----------------------------<  88  5.1   |  27  |  2.37<H>    Ca    8.9      29 Aug 2022 07:46    TPro  5.7<L>  /  Alb  3.2<L>  /  TBili  0.5  /  DBili  x   /  AST  48<H>  /  ALT  35  /  AlkPhos  501<H>  08-29      Procalc  BNPSerum Pro-Brain Natriuretic Peptide: 1894 pg/mL (08-28-22 @ 07:14)    ABG                          12.6   12.97 )-----------( 34       ( 29 Aug 2022 07:43 )             38.6           blood and urine cultures          DNR on chart:  Allergies    No Known Allergies    Intolerances       ITEMS UNCHECKED ARE NOT PRESENT    PRESENT SYMPTOMS: [ ]Unable to self-report - see [ ] CPOT [ ] PAINADS [ ] RDOS  Source if other than patient:  [ ]Family   [ ]Team     Pain:  [ ]yes [x ]no  QOL impact -   Location -                    Aggravating factors -  Quality -  Radiation -  Timing-  Severity (0-10 scale):  Minimal acceptable level (0-10 scale):     CPOT:    https://www.[facility].org/getattachment/[email or web address]-[email or web address]/Critical-Care-Pain-Observation-Tool-(CPOT)    PAIN AD Score:	  http://geriatrictoolkit.[facility]/cog/painad.pdf (Ctrl + left click to view)    Dyspnea:                           [ ]Mild [ ]Moderate [ ]Severe    RDOS:  0 to 2  minimal or no respiratory distress   3  mild distress  4 to 6 moderate distress  >7 severe distress  https://homecareinformation.net/handouts/hen/Respiratory_Distress_Observation_Scale.pdf (Ctrl +  left click to view)     Anxiety:                             [ ]Mild [ ]Moderate [ ]Severe  Fatigue:                             [ ]Mild [ ]Moderate [ ]Severe  Nausea:                             [ ]Mild [ ]Moderate [ ]Severe  Loss of appetite:              [ ]Mild [ ]Moderate [ ]Severe  Constipation:                    [x ]Mild [ ]Moderate [ ]Severe    PCSSQ[Palliative Care Spiritual Screening Question]   Severity (0-10):  Score of 4 or > indicate consideration of Chaplaincy referral.    Chaplaincy Referral: [ ] yes [ ] refused [ ] following    Other Symptoms:  [ ]All other review of systems negative     Palliative Performance Status Version 2:  50 %      http://npc.org/files/news/palliative_performance_scale_ppsv2.pdf    PHYSICAL EXAM:  Vital Signs Last 24 Hrs        27 Aug 2022 07:01  -  28 Aug 2022 07:00  --------------------------------------------------------  IN: 0 mL / OUT: 405 mL / NET: -405 mL    GENERAL: [ ]Cachexia    [x ]Alert  [x ]Oriented x 3  [ ]Lethargic  [ ]Unarousable  [x ]Verbal  [ ]Non-Verbal  Behavioral:   [ ]Anxiety  [ ]Delirium [ ]Agitation [ ]Other  HEENT:  [x ]Normal   [ ]Dry mouth   [ ]ET Tube/Trach  [ ]Oral lesions  PULMONARY:   [ ]Clear [ ]Tachypnea  [ ]Audible excessive secretions   [x ]Rhonchi        [ ]Right [ ]Left [ ]Bilateral  [ ]Crackles        [ ]Right [ ]Left [ ]Bilateral  [ ]Wheezing     [ ]Right [ ]Left [ ]Bilateral  [ ]Diminished BS [ ] Right [ ]Left [ ]Bilateral  CARDIOVASCULAR:    [ x]Regular [ ]Irregular [ ]Tachy  [ ]Gordon [ ]Murmur [ ]Other  GASTROINTESTINAL:  [ ]Soft  [x ]Distended   [x ]+BS  [ ]Non tender [ x]Tender  [ ]Other [ ]PEG [ ]OGT/ NGT   Last BM: none since admission  GENITOURINARY:  [x ]Normal [ ]Incontinent   [ ]Oliguria/Anuria   [ ][place]  MUSCULOSKELETAL:   [ ]Normal   [ x]Weakness  [ ]Bed/Wheelchair bound [ ]Edema  NEUROLOGIC:   [x ]No focal deficits  [ ] Cognitive impairment  [ ] Dysphagia [ ]Dysarthria [ ] Paresis [ ]Other   SKIN:   [x ]Normal  [ ]Rash  [ ]Other  [ ]Pressure ulcer(s) [ ]y [ ]n present on admission    CRITICAL CARE:  [ ]Shock Present  [ ]Septic [ ]Cardiogenic [ ]Neurologic [ ]Hypovolemic  [ ]Vasopressors [ ]Inotropes  [ ]Respiratory failure present [ ]Mechanical Ventilation [ ]Non-invasive ventilatory support [ ]High-Flow   [ ]Acute  [ ]Chronic [ ]Hypoxic  [ ]Hypercarbic [ ]Other  [ ]Other organ failure      RADIOLOGY & ADDITIONAL STUDIES: reviewed    Protein Calorie Malnutrition Present: [ ]mild [ ]moderate [ ]severe [ ]underweight [ ]morbid obesity  https://www.andeal.org/vault/6920/web/files/ONC/Table_Clinical%20Characteristics%20to%20Document%20Malnutrition-White%20JV%20et%20al%202012.pdf    Height (cm): 182.9 (08-26-22 @ 11:38)  Weight (kg): 110 (08-27-22 @ 08:27)  BMI (kg/m2): 32.9 (08-27-22 @ 08:27)    [ ]PPSV2 < or = 30%  [ ]significant weight loss [ ]poor nutritional intake [ ]anasarca[ ]Artificial Nutrition    Other REFERRALS:  [ ]Hospice  [ ]Child Life  [ ]Social Work  [ ]Case management [ ]Holistic Therapy     Goals of Care Document:

## 2022-08-29 NOTE — DIETITIAN INITIAL EVALUATION ADULT - PERSON TAUGHT/METHOD
- Encouraged adequate PO intake for meeting nutritional needs, improving nutritional status and for preventing wt loss   - Discussed the importance of including adequate calories and proteins throughout the day; reviewed protein calorie dense food sources/ meal and snacks ideas./verbal instruction/patient instructed

## 2022-08-29 NOTE — PROGRESS NOTE ADULT - SUBJECTIVE AND OBJECTIVE BOX
Chief Complaint:  Patient is a 72y old  Male who presents with a chief complaint of Hyponatremia and CHA (29 Aug 2022 11:37)      Date of service 08-29-22 @ 12:32      Interval Events:   Patient seen and examined.   Feels like he is going to have a BM.   Tolerating diet.   Planned for paracentesis today    Hospital Medications:  albumin human 25% IVPB 100 milliLiter(s) IV Intermittent every 12 hours  cefTRIAXone   IVPB 2000 milliGRAM(s) IV Intermittent every 24 hours  chlorhexidine 2% Cloths 1 Application(s) Topical daily  gabapentin 200 milliGRAM(s) Oral two times a day  HYDROmorphone  Injectable 1 milliGRAM(s) IV Push every 3 hours PRN  midodrine. 20 milliGRAM(s) Oral three times a day  oxyCODONE    IR 10 milliGRAM(s) Oral every 4 hours PRN  pantoprazole    Tablet 40 milliGRAM(s) Oral before breakfast  polyethylene glycol 3350 17 Gram(s) Oral daily PRN  senna 2 Tablet(s) Oral at bedtime        Review of Systems:  General:  No wt loss, fevers, chills, night sweats, fatigue,   Eyes:  Good vision, no reported pain  ENT:  No sore throat, pain, runny nose, dysphagia  CV:  No pain, palpitations, hypo/hypertension  Resp:  No dyspnea, cough, tachypnea, wheezing  GI:  See HPI  :  No pain, bleeding, incontinence, nocturia  Muscle:  No pain, weakness  Neuro:  No weakness, tingling, memory problems  Psych:  No fatigue, insomnia, mood problems, depression  Endocrine:  No polyuria, polydipsia, cold/heat intolerance  Heme:  No petechiae, ecchymosis, easy bruisability  Integumentary:  No rash, edema    PHYSICAL EXAM:   Vital Signs:  Vital Signs Last 24 Hrs  T(C): 36.9 (29 Aug 2022 10:34), Max: 36.9 (29 Aug 2022 10:31)  T(F): 98.4 (29 Aug 2022 10:34), Max: 98.4 (29 Aug 2022 10:31)  HR: 98 (29 Aug 2022 10:34) (88 - 98)  BP: 100/67 (29 Aug 2022 10:34) (96/66 - 116/74)  BP(mean): --  RR: 18 (29 Aug 2022 10:34) (18 - 18)  SpO2: 98% (29 Aug 2022 10:34) (93% - 99%)    Parameters below as of 29 Aug 2022 10:31  Patient On (Oxygen Delivery Method): nasal cannula  O2 Flow (L/min): 2    Daily Height in cm: 182.9 (29 Aug 2022 10:34)    Daily       PHYSICAL EXAM:     GENERAL:  Appears stated age, well-groomed, well-nourished, no distress  HEENT:  NC/AT,  conjunctivae anicteric, clear and pink,   NECK: supple, trachea midline  CHEST:  Full & symmetric excursion, no increased effort, breath sounds clear  HEART:  Regular rhythm, no JVD  ABDOMEN:  Soft, non-tender, non-distended, normoactive bowel sounds,  no masses , no hepatosplenomegaly, +ascites   EXTREMITIES:  no cyanosis,clubbing or edema  SKIN:  No rash, erythema, or, ecchymoses, no jaundice  NEURO:  Alert, non-focal, no asterixis  PSYCH: Appropriate affect, oriented to place and time  RECTAL: Deferred      LABS Personally reviewed by me:                        12.6   12.97 )-----------( 34       ( 29 Aug 2022 07:43 )             38.6     Mean Cell Volume: 97.0 fl (08-29-22 @ 07:43)    08-29    127<L>  |  86<L>  |  105<H>  ----------------------------<  88  5.1   |  27  |  2.37<H>    Ca    8.9      29 Aug 2022 07:46    TPro  5.7<L>  /  Alb  3.2<L>  /  TBili  0.5  /  DBili  x   /  AST  48<H>  /  ALT  35  /  AlkPhos  501<H>  08-29    LIVER FUNCTIONS - ( 29 Aug 2022 07:46 )  Alb: 3.2 g/dL / Pro: 5.7 g/dL / ALK PHOS: 501 U/L / ALT: 35 U/L / AST: 48 U/L / GGT: x                                       12.6   12.97 )-----------( 34       ( 29 Aug 2022 07:43 )             38.6                         13.4   13.19 )-----------( 35       ( 28 Aug 2022 07:19 )             42.1                         14.1   10.48 )-----------( 33       ( 27 Aug 2022 07:20 )             43.3                         14.8   9.30  )-----------( 25       ( 26 Aug 2022 13:06 )             46.2       Imaging personally reviewed by me:

## 2022-08-29 NOTE — DIETITIAN INITIAL EVALUATION ADULT - CONTINUE CURRENT NUTRITION CARE PLAN
- Continue current diet regimen, consider add Low Na as needed. monitor electrolytes for need for renal diet as needed/indiacted; defer fluid restriction per team.   - Consider formal swallowing evaluation with speech pathologist in setting of swallowing difficulty/gagging.   - Monitor tolerance, PO intake, and adjust as needed./yes

## 2022-08-29 NOTE — DIETITIAN INITIAL EVALUATION ADULT - PERTINENT MEDS FT
MEDICATIONS  (STANDING):  albumin human 25% IVPB 100 milliLiter(s) IV Intermittent every 12 hours  cefTRIAXone   IVPB 2000 milliGRAM(s) IV Intermittent every 24 hours  chlorhexidine 2% Cloths 1 Application(s) Topical daily  gabapentin 200 milliGRAM(s) Oral two times a day  midodrine. 20 milliGRAM(s) Oral three times a day  pantoprazole    Tablet 40 milliGRAM(s) Oral before breakfast  senna 2 Tablet(s) Oral at bedtime    MEDICATIONS  (PRN):  HYDROmorphone  Injectable 1 milliGRAM(s) IV Push every 3 hours PRN Severe Pain (7 - 10)  oxyCODONE    IR 10 milliGRAM(s) Oral every 4 hours PRN Moderate Pain (4 - 6)  polyethylene glycol 3350 17 Gram(s) Oral daily PRN Constipation

## 2022-08-29 NOTE — DIETITIAN INITIAL EVALUATION ADULT - ADD RECOMMEND
- Malnutrition sticker placed, RD to follow-up as per protocol   - Nutrition care plan to remain consistent with pt goals of care  - If not medically contraindicated, recommend nephrovite daily   - monitor BM (?last BM); defer bowel regimen to team   - RD remains available to review diet education and adjust diet recommendations as needed.

## 2022-08-30 NOTE — PROGRESS NOTE ADULT - SUBJECTIVE AND OBJECTIVE BOX
Patient is a 72y old  Male who presents with a chief complaint of Hyponatremia and CHA (30 Aug 2022 11:06)    Patient seen this morning. Feeling better since paracentesis yesterday but still with abdominal fullness and discomfort    MEDICATIONS  (STANDING):  albumin human 25% IVPB 100 milliLiter(s) IV Intermittent every 12 hours  cefTRIAXone   IVPB 2000 milliGRAM(s) IV Intermittent every 24 hours  chlorhexidine 2% Cloths 1 Application(s) Topical daily  gabapentin 200 milliGRAM(s) Oral two times a day  midodrine. 20 milliGRAM(s) Oral three times a day  pantoprazole    Tablet 40 milliGRAM(s) Oral before breakfast  senna 2 Tablet(s) Oral at bedtime    MEDICATIONS  (PRN):  HYDROmorphone  Injectable 1 milliGRAM(s) IV Push every 3 hours PRN Severe Pain (7 - 10)  oxyCODONE    IR 10 milliGRAM(s) Oral every 4 hours PRN Moderate Pain (4 - 6)  polyethylene glycol 3350 17 Gram(s) Oral daily PRN Constipation    Vital Signs Last 24 Hrs  T(C): 36.4 (30 Aug 2022 05:16), Max: 36.7 (29 Aug 2022 13:23)  T(F): 97.6 (30 Aug 2022 05:16), Max: 98 (29 Aug 2022 13:23)  HR: 88 (30 Aug 2022 05:16) (88 - 94)  BP: 98/62 (30 Aug 2022 05:16) (94/62 - 103/62)  BP(mean): --  RR: 18 (30 Aug 2022 05:16) (18 - 18)  SpO2: 98% (30 Aug 2022 05:16) (97% - 98%)    Parameters below as of 30 Aug 2022 05:16  Patient On (Oxygen Delivery Method): nasal cannula        PE  NAD  Awake, alert  Anicteric, MMM  Abd still distended but much less so  Stable bilateral pedal edema L>R  No rash grossly                            12.6   10.27 )-----------( 30       ( 30 Aug 2022 07:04 )             39.3       08-30    133<L>  |  92<L>  |  93<H>  ----------------------------<  104<H>  4.6   |  24  |  1.69<H>    Ca    8.8      30 Aug 2022 07:04    TPro  5.3<L>  /  Alb  3.1<L>  /  TBili  0.6  /  DBili  x   /  AST  46<H>  /  ALT  25  /  AlkPhos  406<H>  08-30        ACC: [id number] EXAM:  DUPLEX SCAN EXT VEINS LOWER BI                          PROCEDURE DATE:  08/29/2022          INTERPRETATION:  CLINICAL INFORMATION: Lower extremity swelling    COMPARISON: None available.    TECHNIQUE: Duplex sonography of the BILATERAL LOWER extremity veins with   color and spectral Doppler, with and without compression.    FINDINGS:    RIGHT:    There is acute, occlusive DVT affecting the right common femoral, deep   femoral, femoral and popliteal veins, above the knee.    The right posterior tibial peroneal trunk, and the posterior tibial,   peroneal and gastrocnemius veins are also thrombosed, below the knee.    LEFT:    And there is acute, occlusive DVT affecting the left common femoral vein   and the left femoral vein in the proximal thigh.    The left femoral vein in the distal thigh and the popliteal vein are   patent and free of thrombus.    The left posterior tibial and peroneal veins are patent and free of   thrombus.      IMPRESSION:    There is acute, occlusive DVT affecting the right common femoral, deep   femoral, femoral and popliteal veins, above the knee.  The right posterior tibial peroneal trunk, and the posterior tibial,   peroneal and gastrocnemius veins are also thrombosed, below the knee.    There is acute, occlusive DVT affecting the left common femoral vein and   the left femoral vein in the proximal thigh.      CASSIA Hinson notified    --- End of Report ---

## 2022-08-30 NOTE — PROGRESS NOTE ADULT - SUBJECTIVE AND OBJECTIVE BOX
[facility] Division of Kidney Diseases & Hypertension  FOLLOW UP NOTE  [phone]--------------------------------------------------------------------------------  Chief Complaint: Hypotension    72 y.o M w/ hx of recent dx of gallbladder cancer with metastasis who presents to hospital for hypotension, found to have creatinine of 1.25, BUN (unknown baseline). Per daughter, patient diagnosed with gallbladder cancer at [facility] in July when he was undergoing a planned gall bladder removal. Patient has received 1 dose of [place]citabine[place]durval[place] [facility] [place] then had worsening SOB beginning this month, and to [facility] for SOB s/p left thoracentesis and placed on salt tabs 1g BID, 1L fluid restriction and lasix 40 mg for LE edema. Currently, he reports to having poor PO intake, eats ice cream and drinks lots of water and endorses polyuria. He was compliant with his medications. S/p paracentesis on 8/29    24 hour events/subjective:  Patient underwent a paracentesis with 5L removal on 8/29. Patient still only received albumin BID. Creatinine improved and sodium also improving     PAST HISTORY  --------------------------------------------------------------------------------  No significant changes to PMH, PSH, FHx, SHx, unless otherwise noted    ALLERGIES & MEDICATIONS  --------------------------------------------------------------------------------  Allergies    No Known Allergies    Intolerances      Standing Inpatient Medications  albumin human 25% IVPB 100 milliLiter(s) IV Intermittent every 12 hours  cefTRIAXone   IVPB 2000 milliGRAM(s) IV Intermittent every 24 hours  chlorhexidine 2% Cloths 1 Application(s) Topical daily  gabapentin 200 milliGRAM(s) Oral two times a day  midodrine. 20 milliGRAM(s) Oral three times a day  pantoprazole    Tablet 40 milliGRAM(s) Oral before breakfast  senna 2 Tablet(s) Oral at bedtime    PRN Inpatient Medications  HYDROmorphone  Injectable 1 milliGRAM(s) IV Push every 3 hours PRN  oxyCODONE    IR 10 milliGRAM(s) Oral every 4 hours PRN  polyethylene glycol 3350 17 Gram(s) Oral daily PRN      REVIEW OF SYSTEMS  --------------------------------------------------------------------------------  Gen: No fevers/chills +fatigue, poor oral intake   Skin: No rashes  Head/Eyes/Ears: Normal hearing,   Respiratory: No dyspnea, +cough  CV: No chest pain  GI: + abdominal pain, diarrhea  : No dysuria, hematuria  MSK: No  edema  Heme: No easy bruising or bleeding  Psych: No significant depression      All other systems were reviewed and are negative, except as noted.    VITALS/PHYSICAL EXAM  --------------------------------------------------------------------------------  T(C): 36.8 (08-30-22 @ 11:48), Max: 36.8 (08-30-22 @ 11:48)  HR: 90 (08-30-22 @ 11:48) (88 - 94)  BP: 101/66 (08-30-22 @ 11:48) (94/62 - 103/62)  RR: 18 (08-30-22 @ 11:48) (18 - 18)  SpO2: 97% (08-30-22 @ 11:48) (97% - 98%)  Wt(kg): --  Height (cm): 182.9 (08-29-22 @ 10:34)  Weight (kg): 110 (08-29-22 @ 10:34)  BMI (kg/m2): 32.9 (08-29-22 @ 10:34)  BSA (m2): 2.31 (08-29-22 @ 10:34)      08-29-22 @ 07:01  -  08-30-22 @ 07:00  --------------------------------------------------------  IN: 360 mL / OUT: 2700 mL / NET: -2340 mL    08-30-22 @ 07:01  -  08-30-22 @ 12:14  --------------------------------------------------------  IN: 120 mL / OUT: 0 mL / NET: 120 mL      Physical Exam:  	Gen: NAD, well-appearing  	HEENT: PERRL, supple neck, clear oropharynx  	Pulm: Diminished airflow in bibasilar lungs  	CV: RRR, S1S2;  	Back: No spinal or CVA tenderness  	Abd: +BS, soft, has two spots of band like erythema , very tight and nodular to touch  	: No suprapubic tenderness                      Extremities:+2 pitting edema                       Neuro: No focal deficits, intact gait  	Skin: Warm, without rashes      LABS/STUDIES  --------------------------------------------------------------------------------              12.6   10.27 >-----------<  30       [08-30-22 @ 07:04]              39.3     133  |  92  |  93  ----------------------------<  104      [08-30-22 @ 07:04]  4.6   |  24  |  1.69        Ca     8.8     [08-30-22 @ 07:04]    TPro  5.3  /  Alb  3.1  /  TBili  0.6  /  DBili  x   /  AST  46  /  ALT  25  /  AlkPhos  406  [08-30-22 @ 07:04]          Creatinine Trend:  SCr 1.69 [08-30 @ 07:04]  SCr 2.37 [08-29 @ 07:46]  SCr 2.33 [08-28 @ 07:14]  SCr 1.70 [08-27 @ 07:09]  SCr 1.19 [08-26 @ 19:15]    Urinalysis - [08-27-22 @ 07:20]      Color Yellow / Appearance Slightly Turbid / SG 1.038 / pH 5.5      Gluc Negative / Ketone Negative  / Bili Negative / Urobili Negative       Blood Small / Protein 30 mg/dL / Leuk Est Negative / Nitrite Negative      RBC 5 / WBC 15 / Hyaline 16 / Gran  / Sq Epi  / Non Sq Epi 14 / Bacteria Negative    Urine Creatinine 88      [08-28-22 @ 14:29]  Urine Protein 95      [08-27-22 @ 16:47]  Urine Sodium 8      [08-28-22 @ 14:29]  Urine Potassium 58      [08-28-22 @ 14:29]  Urine Chloride <20      [08-28-22 @ 14:29]  Urine Phosphorus 49.9      [08-28-22 @ 14:28]  Urine Osmolality 423      [08-28-22 @ 14:29]    TSH 1.80      [08-27-22 @ 07:22]  Lipid: chol 98, , HDL 37, LDL --      [08-27-22 @ 07:21]    HCV 0.06, Nonreact      [08-27-22 @ 07:22]

## 2022-08-30 NOTE — PROGRESS NOTE ADULT - SUBJECTIVE AND OBJECTIVE BOX
Interventional Radiology Follow-Up Note.    This is a 72y Male s/p paracentesis on 8/29 in Interventional Radiology.     Patient seen and examined @ bedside. No complaint offered.    Medication:     cefTRIAXone   IVPB: (08-30)  midodrine.: (08-30)  vancomycin  IVPB: (08-30)    Vitals:   T(F): 98.2, Max: 98.2 (11:48)  HR: 90  BP: 101/66  RR: 18  SpO2: 97%    Physical Exam:  General: Nontoxic, in NAD.  Abd: ND, soft, nttp.      Aspartate Aminotransferase (AST/SGOT): 46 U/L (08-30-22 @ 07:04)  Alanine Aminotransferase (ALT/SGPT): 25 U/L (08-30-22 @ 07:04)  Aspartate Aminotransferase (AST/SGOT): 48 U/L (08-29-22 @ 07:46)  Alanine Aminotransferase (ALT/SGPT): 35 U/L (08-29-22 @ 07:46)        LABS:  Na: 133 (08-30 @ 07:04), 127 (08-29 @ 07:46), 128 (08-28 @ 07:14)  K: 4.6 (08-30 @ 07:04), 5.1 (08-29 @ 07:46), 5.3 (08-28 @ 07:14)  Cl: 92 (08-30 @ 07:04), 86 (08-29 @ 07:46), 88 (08-28 @ 07:14)  CO2: 24 (08-30 @ 07:04), 27 (08-29 @ 07:46), 25 (08-28 @ 07:14)  BUN: 93 (08-30 @ 07:04), 105 (08-29 @ 07:46), 91 (08-28 @ 07:14)  Cr: 1.69 (08-30 @ 07:04), 2.37 (08-29 @ 07:46), 2.33 (08-28 @ 07:14)  Glu: 104(08-30 @ 07:04), 88(08-29 @ 07:46), 116(08-28 @ 07:14)    Hgb: 12.6 (08-30 @ 07:04), 12.6 (08-29 @ 07:43), 13.4 (08-28 @ 07:19)  Hct: 39.3 (08-30 @ 07:04), 38.6 (08-29 @ 07:43), 42.1 (08-28 @ 07:19)  WBC: 10.27 (08-30 @ 07:04), 12.97 (08-29 @ 07:43), 13.19 (08-28 @ 07:19)  Plt: 30 (08-30 @ 07:04), 34 (08-29 @ 07:43), 35 (08-28 @ 07:19)    INR:   PTT:           LIVER FUNCTIONS - ( 30 Aug 2022 07:04 )  Alb: 3.1 g/dL / Pro: 5.3 g/dL / ALK PHOS: 406 U/L / ALT: 25 U/L / AST: 46 U/L / GGT: x              Assessment/Plan:  72y Male with 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 Tito Valentin of [facility] s/p Gemcitabine (dose reduced) /Durvalumab LD on 8/19/2022. Patient is s/p paracentesis on 8/29 in Interventional Radiology.     -h/h stable, vss. no concern for post procedure bleed.    - IR will sign off.   -Global care per primary team.     Please call IR at  [phone] with any questions, concerns, or issues regarding above.    Also available on Teams.

## 2022-08-30 NOTE — CONSULT NOTE ADULT - SUBJECTIVE AND OBJECTIVE BOX
[facility], Division of Infectious Diseases  DAHIANA Elaine S. Shah, Y. Patel, G. 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 Patient admits to abdominal distention, and was recently admitted for a thoracentesis at OSH. History obtained from patient and daugther at the bedside. Patient denies CP, palpitations. Denies headache or lightheadedness.  (26 Aug 2022 15:22)  Patient seen and examined at bedside this afternoon. Patient was noted with erythema on abdomen. He reports having laparoscopic abdominal surgeries in the past for his gall bladder cancer and states he has had chronic induration at incision sites. States he noticed erythema over the areas today, has slight pain with palpation of areas. He denies having fevers or chills. Denies nausea, vomiting, diarrhea. States he does feel abdominal fullness and discomfort is better since paracentesis yesterday.   ROS: 14 point review of systems completed, pertinent positives and negatives as per HPI.    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, right pleura, liver, hyponatremia, HLD   PSH -- abdominal surgeries  FH -- noncontributory   Social History -- denies tobacco, alcohol or illicit drug use    Physical Exam--  Vital Signs Last 24 Hrs  T(F): 98.2 (30 Aug 2022 11:48), Max: 98.2 (30 Aug 2022 11:48)  HR: 90 (30 Aug 2022 11:48) (88 - 94)  BP: 101/66 (30 Aug 2022 11:48) (94/62 - 103/62)  RR: 18 (30 Aug 2022 11:48) (18 - 18)  SpO2: 97% (30 Aug 2022 11:48) (97% - 98%)  General: no acute distress  HEENT: NC/AT, EOMI, anicteric, hoarse voice, neck supple  Lungs: Clear bilaterally without rales, wheezing or rhonchi  Heart: S1, S2 present, regular rate and rhythm. No murmur heard  Abdomen: Soft. ND, slight TTP over indurated area. BS present.   Neuro: AAOx3, no obvious focal deficits   Extremities: No cyanosis or clubbing. No edema.   Skin: Prior abd lap surgical incisions with induration, blanching erythema with slight TTP, no significantly increased warmth  Psychiatric: Appropriate affect and mood for situation.   Lines: R chest port accessed with no erythema     Laboratory & Imaging Data--  CBC:                       12.6   10.27 )-----------( 30       ( 30 Aug 2022 07:04 )             39.3     WBC Count: 10.27 K/uL (08-30-22 @ 07:04)  WBC Count: 12.97 K/uL (08-29-22 @ 07:43)  WBC Count: 13.19 K/uL (08-28-22 @ 07:19)  WBC Count: 10.48 K/uL (08-27-22 @ 07:20)  WBC Count: 9.30 K/uL (08-26-22 @ 13:06)    CMP: 08-30    133<L>  |  92<L>  |  93<H>  ----------------------------<  104<H>  4.6   |  24  |  1.69<H>    Ca    8.8      30 Aug 2022 07:04    TPro  5.3<L>  /  Alb  3.1<L>  /  TBili  0.6  /  DBili  x   /  AST  46<H>  /  ALT  25  /  AlkPhos  406<H>  08-30    LIVER FUNCTIONS - ( 30 Aug 2022 07:04 )  Alb: 3.1 g/dL / Pro: 5.3 g/dL / ALK PHOS: 406 U/L / ALT: 25 U/L / AST: 46 U/L / GGT: x           Microbiology:   Culture - Fungal, Body Fluid (collected 08-29-22 @ 14:50)  Source: Peritoneal Peritoneal Fluid  Preliminary Report (08-30-22 @ 07:37):    Testing in progress    Culture - Body Fluid with Gram Stain (collected 08-29-22 @ 14:50)  Source: Peritoneal Peritoneal Fluid  Gram Stain (08-29-22 @ 20:58):    polymorphonuclear leukocytes seen    No organisms seen    by cytocentrifuge    Culture - Blood (collected 08-27-22 @ 01:55)  Source: .Blood Blood-Peripheral  Preliminary Report (08-28-22 @ 04:02):    No growth to date.    Culture - Blood (collected 08-27-22 @ 01:40)  Source: .Blood Blood-Peripheral  Preliminary Report (08-28-22 @ 04:02):    No growth to date.    Culture - Urine (collected 08-26-22 @ 17:07)  Source: Clean Catch Clean Catch (Midstream)  Final Report (08-27-22 @ 23:41):    <10,000 CFU/mL Normal Urogenital Eliana    Radiology--reviewed  VA Duplex Lower Ext Vein Scan, Bilat (08.29.22 @ 16:34) >IMPRESSION: There is acute, occlusive DVT affecting the right common femoral, deep femoral, femoral and popliteal veins, above the knee. The right posterior tibial peroneal trunk, and the posterior tibial, peroneal and gastrocnemius veins are also thrombosed, below the knee. There is acute, occlusive DVT affecting the left common femoral vein and the left femoral vein in the proximal thigh.    US Abdomen Limited (08.26.22 @ 15:58) >IMPRESSION: Abdominal and pelvic ascites with largest pocket in the left upper quadrant.    CT Abdomen and Pelvis w/ IV Cont (08.26.22 @ 14:43) >IMPRESSION:  CHEST:  *  Pulmonary embolism within left lower lobar and segmental pulmonary arteries.  *  Moderate right and small left pleural effusions.  *  Scattered nonspecific subcentimeter pulmonary nodules. Irregular interlobular septal thickening in the right lung, possibly representing lymphangitic spread of disease.  *  Enlarged heterogeneous thyroid gland with a mixed density left thyroid lobe nodule measuring 2.4 cm.    ABDOMEN AND PELVIS:  *  Scattered indeterminate liver lesions, presumably the patient's known metastatic disease.  *  Findings consistent with known peritoneal carcinomatosis. Moderate to large volume ascites.  *  Ill-defined lesion along the pancreatic tail, possibly reflecting metastatic disease, with a primary pancreatic neoplasm not excluded.  *  Ovoid thickening of the left ventral abdominal musculature measuring 2.3 x 3.3 x 3.7 cm, potentially representing an implant, or possibly an intramuscular hematoma.  *  Linear metallic density in the gastric fundus, possibly an endoclip.   Correlate with patient history.    Active Medications--albumin human 25% IVPB 100 milliLiter(s) IV Intermittent every 12 hours  cefTRIAXone   IVPB 2000 milliGRAM(s) IV Intermittent every 24 hours  chlorhexidine 2% Cloths 1 Application(s) Topical daily  gabapentin 200 milliGRAM(s) Oral two times a day  HYDROmorphone  Injectable 1 milliGRAM(s) IV Push every 3 hours PRN  midodrine. 20 milliGRAM(s) Oral three times a day  oxyCODONE    IR 10 milliGRAM(s) Oral every 4 hours PRN  pantoprazole    Tablet 40 milliGRAM(s) Oral before breakfast  polyethylene glycol 3350 17 Gram(s) Oral daily PRN  senna 2 Tablet(s) Oral at bedtime    Antimicrobials:   cefTRIAXone   IVPB 2000 milliGRAM(s) IV Intermittent every 24 hours

## 2022-08-30 NOTE — PROGRESS NOTE ADULT - SUBJECTIVE AND OBJECTIVE BOX
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 Patient admits to abdominal distention, and was recently admitted for a thoracentesis at OSH. History obtained from patient and daugther at the bedside. Patient denies CP, palpitations. Denies headache or lightheadedness.     (26 Aug 2022 15:22)      PAST MEDICAL & SURGICAL HISTORY:  Gallbladder cancer          MEDICATIONS  (STANDING):  albumin human 25% IVPB 100 milliLiter(s) IV Intermittent every 12 hours  cefTRIAXone   IVPB 2000 milliGRAM(s) IV Intermittent every 24 hours  chlorhexidine 2% Cloths 1 Application(s) Topical daily  gabapentin 200 milliGRAM(s) Oral two times a day  midodrine. 20 milliGRAM(s) Oral three times a day  pantoprazole    Tablet 40 milliGRAM(s) Oral before breakfast  senna 2 Tablet(s) Oral at bedtime      Allergies    No Known Allergies    Intolerances        SOCIAL HISTORY:  Denies ETOh,Smoking,     FAMILY HISTORY:      REVIEW OF SYSTEMS:    CONSTITUTIONAL: No weakness, fevers or chills  EYES/ENT: No visual changes;  No vertigo or throat pain   NECK: No pain or stiffness  RESPIRATORY: No cough, wheezing, hemoptysis; No shortness of breath  CARDIOVASCULAR: No chest pain or palpitations  GASTROINTESTINAL: No abdominal or epigastric pain. No nausea, vomiting, or hematemesis; No diarrhea or constipation. No melena or hematochezia.  GENITOURINARY: No dysuria, frequency or hematuria  NEUROLOGICAL: No numbness or weakness  SKIN: No itching, burning, rashes, or lesions   All other review of systems is negative unless indicated above.    VITAL:  T(C): , Max: 36.9 (08-29-22 @ 10:31)  T(F): , Max: 98.4 (08-29-22 @ 10:31)  HR: 88 (08-30-22 @ 05:16)  BP: 98/62 (08-30-22 @ 05:16)  BP(mean): --  RR: 18 (08-30-22 @ 05:16)  SpO2: 98% (08-30-22 @ 05:16)  Wt(kg): --    I and O's:    08-27 @ 07:01  -  08-28 @ 07:00  --------------------------------------------------------  IN: 0 mL / OUT: 405 mL / NET: -405 mL    08-28 @ 07:01  -  08-29 @ 07:00  --------------------------------------------------------  IN: 462 mL / OUT: 1000 mL / NET: -538 mL    08-29 @ 07:01  -  08-30 @ 06:48  --------------------------------------------------------  IN: 360 mL / OUT: 2700 mL / NET: -2340 mL      Height (cm): 182.9 (08-29 @ 10:34)  Weight (kg): 110 (08-29 @ 10:34)  BMI (kg/m2): 32.9 (08-29 @ 10:34)  BSA (m2): 2.31 (08-29 @ 10:34)    PHYSICAL EXAM:    Constitutional: NAD  HEENT: PERRLA,   Neck: No JVD  Respiratory: CTA B/L  Cardiovascular: S1 and S2  Gastrointestinal: BS+, soft, NT/ND  Extremities: No peripheral edema  Neurological: A/O x 3, no focal deficits  Psychiatric: Normal mood, normal affect  : No Odell  Skin: No rashes  Access: Not applicable  Back: No CVA tenderness    LABS:                        12.6   12.97 )-----------( 34       ( 29 Aug 2022 07:43 )             38.6     08-29    127<L>  |  86<L>  |  105<H>  ----------------------------<  88  5.1   |  27  |  2.37<H>    Ca    8.9      29 Aug 2022 07:46    TPro  5.7<L>  /  Alb  3.2<L>  /  TBili  0.5  /  DBili  x   /  AST  48<H>  /  ALT  35  /  AlkPhos  501<H>  08-29          RADIOLOGY & ADDITIONAL STUDIES:

## 2022-08-30 NOTE — PROGRESS NOTE ADULT - PROBLEM SELECTOR PLAN 1
Left lower lobar and segmental PE - incidentally found on CT C/A/P with IV contrast  -Likely provoked in the setting of malignancy   -Troponins, proBNP elevated  -Off full dose AC in the setting of thrombocytopenia. CT also with concern for L intramuscular hematoma  -LE duplex +DVT  -F/u TTE  -Vascular cardiology f/u  -Heme/onc f/u  -IR consult for IVC filter.

## 2022-08-30 NOTE — PROGRESS NOTE ADULT - PROBLEM SELECTOR PLAN 1
Patient with hyponatremia of 127 on presentation likely volume overloaded vs SIADH. Per daughter, was 127 at [facility] earlier this month and patient has been drinking lots of water at home    Sodium improved to 133 after paracentesis with concurrent albumin. lease continue with  fluid restriction to <1L.  C/w albumin BID for now. Whenever patient is to get repeat paracentesis, he will need concurrent albumin unit in 1:1 ratio. It will help with hemodynamics and prevent intravascular volume depletion.  AM Cortisol was 33. Order TSH and obtain Urinalysis, Urine lytes, and Uprot/creat.Hold diuretics for now as low BP

## 2022-08-30 NOTE — PROGRESS NOTE ADULT - ASSESSMENT
73 y/o M with PMH of metastatic gallbladder CA (with mets to peritoneum, pleura, liver), hyponatremia, HLD. Presents to ED from [facility] for hypotension (reported SBP 70s), CHA, hyponatremia. Also admits to abdominal distention. Noted to have recent hospital admission at [facility] (3 weeks ago per patient, s/p L thoracentesis). CT C/A/P with moderate R and small L pl effusions, scattered pulm nodules with concern for lymphangitic spread, scattered liver & pancreatic lesions, peritoneal carcinomatosis with moderate to large volume ascites Also with incidental finding of L lower lobar and segmental PE. Pulmonary called to consult for pleural effusions, PE.

## 2022-08-30 NOTE — PROGRESS NOTE ADULT - ASSESSMENT
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 Patient admits to abdominal distention, and was recently admitted for a thoracentesis.    # Hyponatremia  # hypotension  # ascites  # CHA  volume overload vs SIADH  Diet with fluid restriction to 1L   renal following  creat improving  cont midodrine borderline BP  sp paracentesis 8/29 4.9 L removed  cont albumin  C/w Rocephin for prophylaxis for now, fu cultures  leukocytosis downtrending  monitor erythema on abd    # acute pulmonary embolism & DVT  Unable to AC in view of thrombocytopenia & malignancy  TTE  IR consult for IVC filter  vascular cardiology evaluated    # Stage IV Ca Gallbladder diffuse mets  # Thrombocytopenia Likely due to Chemotherapy   # transaminitis  outpt following Dr. Tito Valentin of [facility]  sp Gemcitabine / Durvalumab ([place] 8/19/2022), heme/onc following  appreciate palliative care discussion with family - would like to pursue all measures, full code  transfuse platelets if <10K or <50K with bleeding or prior to procedures --> Per IR, goal of Platelets is > 30K for paracentesis   statin on hold    Hold off on pharm DVT PPX for now    dw wife Sukhjinder [phone] today    PCP Dr. Patricia Goddard & Dr Samson Saravia will be covering me starting tomorrow.  Please contact with any questions or concerns [phone]. 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 Patient admits to abdominal distention, and was recently admitted for a thoracentesis.    # Hyponatremia  # hypotension  # ascites  # CHA  volume overload vs SIADH  Diet with fluid restriction to 1L   renal following  creat improving  cont midodrine borderline BP  sp paracentesis 8/29 4.9 L removed  cont albumin  C/w Rocephin for prophylaxis for now, fu cultures  leukocytosis downtrending  monitor erythema on abd    # acute pulmonary embolism & DVT  Unable to AC in view of thrombocytopenia & malignancy  TTE  IR consult for IVC filter  vascular cardiology fu    # Stage IV Ca Gallbladder diffuse mets  # Thrombocytopenia Likely due to Chemotherapy   # transaminitis  [place] Tito Valentin [place] [facility]  [place] Gemcitabine / Durvalumab ([place] 8/19/2022), heme/onc following  appreciate palliative care discussion with family - would like to pursue all measures, full code  transfuse platelets if <10K or <50K with bleeding or prior to procedures --> Per IR, goal of Platelets is > 30K for paracentesis   statin on hold    Hold off on pharm DVT PPX for now    dw wife Sukhjinder [phone] today    PCP Dr. Patricia Goddard & Dr Samson Saravia will be covering me starting tomorrow.  Please contact with any questions or concerns [phone].

## 2022-08-30 NOTE — CHART NOTE - NSCHARTNOTEFT_GEN_A_CORE
Infrequent PRN use  Consider PO solely  Link to GOC note, no interest in hospice at this time.      In the event of newly developing, evolving, or worsening symptoms, please contact the Palliative Medicine team via pager (if the patient is at [facility] #[phone] or if the patient is at [facility] #[phone]) The Geriatric and Palliative Medicine service has coverage 24 hours a day/ 7 days a week to provide medical recommendations regarding symptom management needs via telephone

## 2022-08-30 NOTE — ADVANCED PRACTICE NURSE CONSULT - REASON FOR CONSULT
Requested by staff to assess skin status of pt a/w a pressure injury. 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 Patient admits to abdominal distention, and was recently admitted for a thoracentesis at OSH. History obtained from patient and daughter at the bedside. Patient denies CP, palpitations. Denies headache or lightheadedness.

## 2022-08-30 NOTE — ADVANCED PRACTICE NURSE CONSULT - RECOMMEDATIONS
Will recommend the followin. Sacrum, b/l buttocks: continue to monitor, Cavilon to periwound skin, Allevyn foam change every 3 days and prn for soiling/drainage.  2. Continue with T&P  3. Complete Cair boots  4. Seat cushion when OOB to chair  5. Nutrition support as pt condition allows  Tx plan discussed with RN

## 2022-08-30 NOTE — PROGRESS NOTE ADULT - SUBJECTIVE AND OBJECTIVE BOX
Patient is a 72y old  Male who presents with a chief complaint of Hyponatremia and CHA (30 Aug 2022 10:05)      SUBJECTIVE / OVERNIGHT EVENTS:    Patient seen and examined. pt states he feels better after paracentesis. on 2L. no cp sob.      Vital Signs Last 24 Hrs  T(C): 36.4 (30 Aug 2022 05:16), Max: 36.7 (29 Aug 2022 13:23)  T(F): 97.6 (30 Aug 2022 05:16), Max: 98 (29 Aug 2022 13:23)  HR: 88 (30 Aug 2022 05:16) (88 - 94)  BP: 98/62 (30 Aug 2022 05:16) (94/62 - 103/62)  BP(mean): --  RR: 18 (30 Aug 2022 05:16) (18 - 18)  SpO2: 98% (30 Aug 2022 05:16) (97% - 98%)    Parameters below as of 30 Aug 2022 05:16  Patient On (Oxygen Delivery Method): nasal cannula      I&O's Summary    29 Aug 2022 07:01  -  30 Aug 2022 07:00  --------------------------------------------------------  IN: 360 mL / OUT: 2700 mL / NET: -2340 mL    30 Aug 2022 07:01  -  30 Aug 2022 11:12  --------------------------------------------------------  IN: 120 mL / OUT: 0 mL / NET: 120 mL        PE:  GENERAL: NAD, AAOx2  CHEST/LUNG: CTABL, No wheeze  HEART: Regular rate and rhythm; no murmur  ABDOMEN: Soft, 3 points of small erythema, no warmth, Bowel sounds present  : rodriguez  EXTREMITIES:  2+ Peripheral Pulses, +2 LE edema  NEURO: No focal deficits    LABS:                        12.6   10.27 )-----------( 30       ( 30 Aug 2022 07:04 )             39.3     08-30    133<L>  |  92<L>  |  93<H>  ----------------------------<  104<H>  4.6   |  24  |  1.69<H>    Ca    8.8      30 Aug 2022 07:04    TPro  5.3<L>  /  Alb  3.1<L>  /  TBili  0.6  /  DBili  x   /  AST  46<H>  /  ALT  25  /  AlkPhos  406<H>  08-30      CAPILLARY BLOOD GLUCOSE                RADIOLOGY & ADDITIONAL TESTS:    Imaging Personally Reviewed:  [x] YES  [ ] NO    Consultant(s) Notes Reviewed:  [x] YES  [ ] NO    MEDICATIONS  (STANDING):  albumin human 25% IVPB 100 milliLiter(s) IV Intermittent every 12 hours  cefTRIAXone   IVPB 2000 milliGRAM(s) IV Intermittent every 24 hours  chlorhexidine 2% Cloths 1 Application(s) Topical daily  gabapentin 200 milliGRAM(s) Oral two times a day  midodrine. 20 milliGRAM(s) Oral three times a day  pantoprazole    Tablet 40 milliGRAM(s) Oral before breakfast  senna 2 Tablet(s) Oral at bedtime    MEDICATIONS  (PRN):  HYDROmorphone  Injectable 1 milliGRAM(s) IV Push every 3 hours PRN Severe Pain (7 - 10)  oxyCODONE    IR 10 milliGRAM(s) Oral every 4 hours PRN Moderate Pain (4 - 6)  polyethylene glycol 3350 17 Gram(s) Oral daily PRN Constipation      Care Discussed with Consultants/Other Providers [x] YES  [ ] NO    HEALTH ISSUES - PROBLEM Dx:  Hyponatremia    Suspected pulmonary embolism    Suspected deep vein thrombosis (DVT)    Pain due to neoplasm    Gallbladder cancer    Weakness    Palliative care encounter    Pleural effusion    Pulmonary embolism    Thrombocytopenia    Ascites    Shortness of breath    Constipation    CHA (acute kidney injury)    DVT, lower extremity

## 2022-08-30 NOTE — ADVANCED PRACTICE NURSE CONSULT - ASSESSMENT
The pt was encountered on 2DS[facility]- MR Vaz is in a [employer]re P 500 support surface and needs assistance with T&P. Will recommend Complete cair boots to off-load the heels.  As the pt was a/w with a pressure injury, he was seen by nutrition and found to have moderate protein calorie malnutrition.  Upon assessment, the pt presented with a foam dressing to the sacrum- it was gently peeled back for skin assessment. On the sacrum and b/l buttocks was a deep tissue injury measuring 8cmx 5.5cm x0cm. there was approximately 80% deep red intact skin and 20% soft black eschar- the wound is evolving. Will recommend to continue with the foam dressing to cushion and to promote moist healing.  Pt reports very decreased activity of late at home- we discussed pressure injury prevention and the importance of T&P.

## 2022-08-30 NOTE — PROGRESS NOTE ADULT - SUBJECTIVE AND OBJECTIVE BOX
Follow-up Pulm Progress Note    O2 sats 97% on 2LNC  Denies CP, SOB    Medications:  MEDICATIONS  (STANDING):  albumin human 25% IVPB 100 milliLiter(s) IV Intermittent every 12 hours  cefTRIAXone   IVPB 2000 milliGRAM(s) IV Intermittent every 24 hours  chlorhexidine 2% Cloths 1 Application(s) Topical daily  gabapentin 200 milliGRAM(s) Oral two times a day  midodrine. 20 milliGRAM(s) Oral three times a day  pantoprazole    Tablet 40 milliGRAM(s) Oral before breakfast  senna 2 Tablet(s) Oral at bedtime    MEDICATIONS  (PRN):  HYDROmorphone  Injectable 1 milliGRAM(s) IV Push every 3 hours PRN Severe Pain (7 - 10)  oxyCODONE    IR 10 milliGRAM(s) Oral every 4 hours PRN Moderate Pain (4 - 6)  polyethylene glycol 3350 17 Gram(s) Oral daily PRN Constipation    Vital Signs Last 24 Hrs  T(C): 36.4 (30 Aug 2022 05:16), Max: 36.9 (29 Aug 2022 10:31)  T(F): 97.6 (30 Aug 2022 05:16), Max: 98.4 (29 Aug 2022 10:31)  HR: 88 (30 Aug 2022 05:16) (88 - 98)  BP: 98/62 (30 Aug 2022 05:16) (94/62 - 103/62)  BP(mean): --  RR: 18 (30 Aug 2022 05:16) (18 - 18)  SpO2: 98% (30 Aug 2022 05:16) (97% - 98%)    Parameters below as of 30 Aug 2022 05:16  Patient On (Oxygen Delivery Method): nasal cannula    08-29 @ 07:01  -  08-30 @ 07:00  --------------------------------------------------------  IN: 360 mL / OUT: 2700 mL / NET: -2340 mL    LABS:                        12.6   10.27 )-----------( 30       ( 30 Aug 2022 07:04 )             39.3     08-30    133<L>  |  92<L>  |  93<H>  ----------------------------<  104<H>  4.6   |  24  |  1.69<H>    Ca    8.8      30 Aug 2022 07:04    TPro  5.3<L>  /  Alb  3.1<L>  /  TBili  0.6  /  DBili  x   /  AST  46<H>  /  ALT  25  /  AlkPhos  406<H>  08-30      Serum Pro-Brain Natriuretic Peptide: 1894 pg/mL (08-28-22 @ 07:14)    Fluid characteristics  -- 08-29 @ 14:50  pH --    tprot 3.6    Cell count  Appearance Cloudy  Fluid type --  BF lymph 45  color Yellow  eosinophil --  PMN --  Mesothelial 1  Monocyte 11  Other body cells --    CULTURES:     Culture - Blood (collected 08-27-22 @ 01:55)  Source: .Blood Blood-Peripheral  Preliminary Report (08-28-22 @ 04:02):    No growth to date.    Culture - Blood (collected 08-27-22 @ 01:40)  Source: .Blood Blood-Peripheral  Preliminary Report (08-28-22 @ 04:02):    No growth to date.    Culture - Urine (collected 08-26-22 @ 17:07)  Source: Clean Catch Clean Catch (Midstream)  Final Report (08-27-22 @ 23:41):    <10,000 CFU/mL Normal Urogenital Eliana    Culture - Body Fluid with Gram Stain (collected 08-29-22 @ 14:50)  Source: Peritoneal Peritoneal Fluid  Gram Stain (08-29-22 @ 20:58):    polymorphonuclear leukocytes seen    No organisms seen    by cytocentrifuge    Culture - Fungal, Body Fluid (collected 08-29-22 @ 14:50)  Source: Peritoneal Peritoneal Fluid  Preliminary Report (08-30-22 @ 07:37):    Testing in progress    Physical Examination:  PULM: Decreased BS at bases  CVS: S1, S2 heard    RADIOLOGY REVIEWED    CT chest: < from: CT Chest w/ IV Cont (08.26.22 @ 14:43) >    FINDINGS:  Streak artifact related the patient's upper extremities limits portions   of the exam, most prominently within the abdomen.    CHEST:  LUNGS, PLEURA, AND LARGE AIRWAYS: Patent central airways. Moderate right   and small left pleural effusions with associated compressive atelectasis.   Scattered bilateral pulmonary nodules, with example in the left upper   lobe measuring 4 mm (series 3 image 124), and example in the right upper   lobe measuring 4 mm (series 3 image 155). Mild interlobular septal   thickening in the right lung, which appears somewhat irregular, possibly   representing lymphangitic spread of disease.  VESSELS: Right chest wall port with catheter tip in the right atrium.   Atherosclerotic changes. Filling defect within the left lower lobar and   segmental pulmonary arteries, concerning for pulmonary embolism (series   601 image 85).  HEART: Heart size is normal. Mitral annular calcification. No pericardial   effusion.  MEDIASTINUM AND DARYL: No lymphadenopathy.  CHEST WALL AND LOWER NECK: Enlarged heterogeneous thyroid gland with   mixed density left thyroid nodule measuring approximate 2.4 cm (series 2  image 2).    ABDOMEN AND PELVIS:    LIVER: Scattered indeterminate hypodense liver lesions, with a larger   example at the dome measuring 4.0 cm. Another example measuring   approximately 2.6 cm at the posterior dome has an associated coarse   calcification.  BILE DUCTS: Normal caliber.  GALLBLADDER: Hyperdensity within the gallbladder may represent sludge, or   possibly the patient's known malignancy.  SPLEEN: Within normal limits.  PANCREAS: Ill-defined hypoattenuating lesion along the pancreatic tail   measuring approximately 3.8 x 2.1 cm (series 2 image 75). Abnormal tissue   extends into the splenic hilum with likely encasement of splenic vessels   and abuts the adjacent stomach.  ADRENALS: Within normal limits.  KIDNEYS/URETERS: A fewpunctate nonobstructing calculi in the upper pole   of the right kidney. No hydronephrosis.    Portions of the pelvis are obscured by streak artifact from a left hip   prosthesis.    BLADDER: Partially obscured by streak artifact; grossly within normal   limits.  REPRODUCTIVE ORGANS: Partially obscured by streak artifact. The prostate   is normal in size.    BOWEL: Linear metallic density measuring approximately 2 cm in length at   the gastric fundus. Colonic diverticulosis. No bowel obstruction.  PERITONEUM: Moderate to large volume ascites. Infiltration of the   peritoneum with appearance of omental caking. Sites of irregular   peritoneal thickening with nodularity, with example in the left lower   quadrant (series 2 image 132).  VESSELS: Atherosclerotic changes.  RETROPERITONEUM/LYMPH NODES: No lymphadenopathy.  ABDOMINAL WALL: Small fat-containing left inguinal hernia. Ovoid   thickening of the left ventral abdominal wall musculature measuring 2.3 x   3.3 x 3.7 cm (series 2 image 127). Subcutaneous infiltration in the upper   ventral abdominal wall.  BONES: Left hip arthroplasty. Degenerative changes. Grade 1   anterolisthesis of L4 over L5. At least moderate central canal narrowing   at L4-L5. Calcified bodies extending inferiorly below the left   glenohumeral joint. Calcified body adjacent to the right glenohumeral   joint.    IMPRESSION:    CHEST:  *  Pulmonary embolism within left lower lobar and segmental pulmonary   arteries.  *  Moderate right and small left pleural effusions.  *  Scattered nonspecific subcentimeter pulmonary nodules. Irregular   interlobular septal thickening in the right lung, possibly representing   lymphangitic spread of disease.  *  Enlarged heterogeneous thyroid gland with a mixed density leftthyroid   lobe nodule measuring 2.4 cm.    ABDOMEN AND PELVIS:  *  Scattered indeterminate liver lesions, presumably the patient's known   metastatic disease.  *  Findings consistent with known peritoneal carcinomatosis. Moderate to   large volume ascites.  *  Ill-defined lesion along the pancreatic tail, possibly reflecting   metastatic disease, with a primary pancreatic neoplasm not excluded.  *  Ovoid thickening of the left ventral abdominal musculature measuring   2.3 x 3.3 x 3.7 cm, potentially representing an implant, or possibly an   intramuscular hematoma.  *  Linear metallic density in the gastric fundus, possibly an endoclip.   Correlate with patient history.      Findings of pulmonary embolism were discussed with Dr. Argueta  8/26/2022   3:49 PM by Dr. Staley with read back confirmation.    --- End of Report ---    < end of copied text >      < from: [facility] Duplex Lower Ext Vein Scan, Bilat (08.29.22 @ 16:34) >  FINDINGS:    RIGHT:    There is acute, occlusive DVT affecting the right common femoral, deep   femoral, femoral and popliteal veins, above the knee.    The right posterior tibial peroneal trunk, and the posterior tibial,   peroneal and gastrocnemius veins are also thrombosed, below the knee.    LEFT:    And there is acute, occlusive DVT affecting the left common femoral vein   and the left femoral vein in the proximal thigh.    The left femoral vein in the distal thigh and the popliteal vein are   patent and free of thrombus.    The left posterior tibial and peroneal veins are patent and free of   thrombus.      IMPRESSION:    There is acute, occlusive DVT affecting the right common femoral, deep   femoral, femoral and popliteal veins, above the knee.  The right posterior tibial peroneal trunk, and the posterior tibial,   peroneal and gastrocnemius veins are also thrombosed, below the knee.    There is acute, occlusive DVT affecting the left common femoral vein and   the left femoral vein in the proximal thigh.      < end of copied text >

## 2022-08-30 NOTE — CONSULT NOTE ADULT - ASSESSMENT
Patient is a 72 year old male with PMHx of gallbladder cancer with metastasis to the peritoneum, right pleura, liver, hyponatremia, HLD who presents to [facility] from outside laboratory for hypotension, CHA and hyponatremia. Patient admits to abdominal distention, and was recently admitted for a thoracentesis at OSH. ID consulted for concern for abdominal cellulitis.     Abdominal cellulitis, rule out abscess vs hematoma   - abdominal areas of induration over prior laparoscopic incisions -- chronic per pt since surgery due to scar tissue but now with blanching erythema and slight TTP, no significant warmth   - afebrile, WBC trended noted - wnl today   - prior CTAP reviewed, abd wall findings - Small fat-containing L inguinal hernia. Ovoid thickening of L ventral abdominal wall musculature measuring 2.3 x 3.3 x 3.7 cm potentially representing implant or possible intramuscular hematoma. Subcutaneous infiltration in the upper ventral abdominal wall.   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 Melgar, RN  Alex Medel M.D.  [facility], Division of Infectious Diseases  [phone]  After 5pm on weekdays and all day on weekends - please call [phone]

## 2022-08-30 NOTE — CONSULT NOTE ADULT - ASSESSMENT
Interventional Radiology    Evaluate for Procedure: IVC filter placement    HPI: Patient is a 72 year old male with PMHx of gallbladder cancer with metastasis to the peritoneum, found to have bilateral above the knee DVT and pulmonary embolism; team unable to anticoagulate. IR consulted for IVC filter placement.     Allergies:   Medications (Abx/Cardiac/Anticoagulation/Blood Products)    cefTRIAXone   IVPB: 100 mL/Hr IV Intermittent (08-30 @ 11:45)  midodrine.: 20 milliGRAM(s) Oral (08-30 @ 11:45)  vancomycin  IVPB: 166.67 mL/Hr IV Intermittent (08-30 @ 14:17)    Data:    T(C): 36.8  HR: 90  BP: 101/66  RR: 18  SpO2: 97%    -WBC 10.27 / HgB 12.6 / Hct 39.3 / Plt 30  -Na 133 / Cl 92 / BUN 93 / Glucose 104  -K 4.6 / CO2 24 / Cr 1.69  -ALT 25 / Alk Phos 406 / T.Bili 0.6  -INR 1.31 / PTT 23.5    Assessment/Plan:   Patient is a 72 year old male with PMHx of gallbladder cancer with metastasis to the peritoneum, found to have bilateral above the knee DVT and pulmonary embolism; team unable to anticoagulate. IR consulted for IVC filter placement.   -- IR will plan to perform IVC filter placement on thursday 9/1/2022   -- NPO at midnight on wednesday 8/31/2022  -- please place IR procedure request order under Dr. Rangel    --  Nnamdi Torres DO/NAJMA, PGY-4  Vascular and Interventional Radiology   Available on Microsoft Teams    - Non-emergent consults: Place IR consult order in [place]  - Emergent issues (pager): [facility] [phone]; [facility] [phone]; [phone]  - Scheduling questions: [facility] [phone]; [facility] [phone]  - Clinic/outpatient booking: [facility] [phone]; [facility] [phone]

## 2022-08-30 NOTE — PROGRESS NOTE ADULT - PROBLEM SELECTOR PLAN 5
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 Valentin at [facility]  -S/p chemo  -CT C/A/P with scattered lung nodules with concern for lymphangitic spread, scattered liver & pancreatic lesions, peritoneal carcinomatosis   -Heme/onc f/u.  -Prognosis guarded, palliative care f/u.

## 2022-08-30 NOTE — PROGRESS NOTE ADULT - ASSESSMENT
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 Tito Valentin of [facility] s/p Gemcitabine (dose reduced) /Durvalumab LD 8/19/2022 presenting with hypotension 70's/60's,  noted to be hyponatremic (Na 127) and CHA with volume depletion.    Of note patient was recently admitted to [facility] for SOB s/p left thoracentesis and was noted to be hyponatremic on that admission and was DC'd on salt tabs 1g BID, 1L fluid restriction and Lasix 40 mg for LE edema.    Stage IV Ca Gallbladder  --Under care by Dr. Tito Valentin of [facility]  --S/P Gemcitabine (reduced dose)/Durvalumab (LD 8/19/2022)  --He was offered hospice vs tx at [facility] in the past, pt wanted to give tx a try. At this time, he has multiple complications, would be a poor candidate for systemic tx if he does not improve. --Per discussion with Dr Valentin, hospice appropriate  --Palliative care on board for symptom management; planning family meeting for [facility]  --Per their most recent note, patient still wants to pursue disease modifying treatment at [facility]    Ascites  --S/P paracentesis 8/29 - 4.9 L removed  --Improved discomfort but still present  --On Rocephin ppx with concern for SBP - cultures pending    Pulmonary Embolus  --Cannot anticoagulate at this time given thrombocytopenia  --LE Doppler shows acute LLE DVT  --Patient will requireIVC filter as platelets are too low for anticoagulation    Hyponatremia  --Recently treated at [facility]  --Had been on Na tabs and fluid restriction  --Na 122 today   --Treatment per primary team, nephrology    CHA  --renal following, related to multiple issues, fluid imbalance  --Creatinine today 1.69    Hypotension  --Most likely secondary to volume depletion as well  --Improved with IVF   --on midodrine  --defer to renal, GI, specialists and primary team    Thrombocytopenia  -- Stable at 30K  --Most likely secondary to chemotherapy/liver disease  --monitor for now  --Please transfuse platelets if <10K or <50K with bleeding or prior to procedures  -- PTT low, not DIC    We will continue to follow patient and coordinate with [facility]    José Arambula PA-C  Hematology/Oncology  [facility]   [phone] (office)  [phone] (alt office)  Evenings and weekends please call MD on call or office

## 2022-08-31 NOTE — PROVIDER CONTACT NOTE (OTHER) - SITUATION
Patient presenting with pain in lower abdomen, along with no urine output for approximately 6 hours.
Patient with redness throughout body.
Patient presenting with pain in abdomen, Oxycodone previously administered, Dilaudid on PRN list but patient presenting with BP of 96/66.
BP 78/62

## 2022-08-31 NOTE — PROGRESS NOTE ADULT - PROBLEM SELECTOR PLAN 5
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 Valentin at [facility]  -S/p chemo  -CT C/A/P with scattered lung nodules with concern for lymphangitic spread, scattered liver & pancreatic lesions, peritoneal carcinomatosis   -Heme/onc f/u.  -Prognosis guarded, palliative care f/u.

## 2022-08-31 NOTE — PROGRESS NOTE ADULT - ASSESSMENT
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 Tito Valentin of [facility] s/p Gemcitabine (dose reduced) /Durvalumab LD 8/19/2022 presenting with hypotension 70's/60's,  noted to be hyponatremic (Na 127) and CHA with volume depletion.    Of note patient was recently admitted to [facility] for SOB s/p left thoracentesis and was noted to be hyponatremic on that admission and was DC'd on salt tabs 1g BID, 1L fluid restriction and Lasix 40 mg for LE edema.    Stage IV Ca Gallbladder  --Under care by Dr. Tito Valentin of [facility]  --S/P Gemcitabine (reduced dose)/Durvalumab (LD 8/19/2022)  --He was offered hospice vs tx at [facility] in the past, pt wanted to give tx a try. At this time, he has multiple complications, would be a poor candidate for systemic tx if he does not improve. --Per discussion with Dr Valentin, hospice appropriate  --Palliative care on board for symptom management; planning family meeting for [facility]  --Per their most recent note, patient still wants to pursue disease modifying treatment at [facility]    Ascites  --S/P paracentesis 8/29 - 4.9 L removed  --Improved discomfort but still present  --On Rocephin ppx with concern for SBP - cultures NGTD  --Pt on Rocephin     Pulmonary Embolus  --Cannot anticoagulate at this time given thrombocytopenia  --LE Doppler shows acute LLE DVT  --Patient will require IVC filter as platelets are too low for anticoagulation  --IR will place IVC filter on 9/1    Hyponatremia  --Recently treated at [facility]  --Had been on Na tabs and fluid restriction  --Na 122 today   --Treatment per primary team, nephrology    CHA  --renal following, related to multiple issues, fluid imbalance  --Creatinine today 1.69    Hypotension  --Most likely secondary to volume depletion as well  --Improved with IVF   --on midodrine  --defer to renal, GI, specialists and primary team    Thrombocytopenia  -- Stable at 30K  --Most likely secondary to chemotherapy/liver disease  --monitor for now  --Please transfuse platelets if <10K or <50K with bleeding or prior to procedures  -- PTT low, not DIC    EF 44%  --Seen on recent TTE  --Cardiology called to evaluate    We will continue to follow patient and coordinate with [facility]    José Arambula PA-C  Hematology/Oncology  [facility]   [phone] (office)  [phone] (alt office)  Evenings and weekends please call MD on call or office

## 2022-08-31 NOTE — PROGRESS NOTE ADULT - ASSESSMENT
Patient is a 72 year old male with PMHx of gallbladder cancer with metastasis to the peritoneum, right pleura, liver, hyponatremia, HLD who presents to [facility] from outside laboratory for hypotension, CHA and hyponatremia. Patient admits to abdominal distention, and was recently admitted for a thoracentesis at OSH. ID consulted for concern for abdominal cellulitis.     Abdominal cellulitis, rule out abscess vs hematoma   - 3 indurated areas on abdomen -- chronic since surgery/drains per pt but now with erythema, slight TTP   - afebrile, WBC trended noted - normalized    - abdominal U/S with 4cm and 10 cm fluid collections in R and L abd sites that track to skin -- amenable to aspiration; 3rd is a 2cm septated collection at umbilicus area does not appear amenable to aspiration   - recommend IR or surgical evaluation for aspiration of above collections     -- please send aspirates for gram stain and culture   - continue on ceftriaxone (started 8/27--)   - continue vancomycin 1.25g IV Q24h (d/w pharmacist)    -- monitor trough, goal 10-15   - monitor clinically, temps, WBC    Ascites, s/p paracentesis 8/29, rule out SBP   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 Rani Medel M.D.  [facility], Division of Infectious Diseases  [phone]  After 5pm on weekdays and all day on weekends - please call [phone]

## 2022-08-31 NOTE — CHART NOTE - NSCHARTNOTESELECT_GEN_ALL_CORE
Event Note
Hem/Onc PA note/Event Note
IR Resident/Off Service Note
Nephrology
Palliative/Event Note
RRT/Rapid Response

## 2022-08-31 NOTE — CHART NOTE - NSCHARTNOTEFT_GEN_A_CORE
IR Follow-Up     imaging reviewed (ultrasound and CT) for anterior abdominal wall edema.   likely associated with site of prior paracentesis.   no drainable collection.   will evaluate when patient comes down to IR for IVC filter tomorrow.     --  Nnamdi Torres DO/NAJMA  Interventional Radiology Resident (PGY-4)  Available on [employer] TEAMS    For EMERGENT inquiries/questions:  IR Pager ([facility]): [phone]  IR Pager ([facility]): [phone] ; [phone]    For non-emergent consults/questions:   Please place a sunrise order "Consult- Interventional Radiology" with an appropriate callback number    For questions about scheduling during appropriate work hours, call IR :  [facility]: [phone]  [facility]: [phone]    For outpatient IR booking:  [facility]: [phone]  [facility]: [phone]

## 2022-08-31 NOTE — PROGRESS NOTE ADULT - SUBJECTIVE AND OBJECTIVE BOX
Patient is a 72y old  Male who presents with a chief complaint of Hyponatremia and CHA (31 Aug 2022 12:51)    Patient seen this morning. Much  more comfortable since paracentesis but feels like more fluid can be removed. Pending IVC filter placement tomorrow.    MEDICATIONS  (STANDING):  albumin human 25% IVPB 100 milliLiter(s) IV Intermittent every 6 hours  cefTRIAXone   IVPB 2000 milliGRAM(s) IV Intermittent every 24 hours  chlorhexidine 2% Cloths 1 Application(s) Topical daily  gabapentin 200 milliGRAM(s) Oral two times a day  midodrine. 20 milliGRAM(s) Oral three times a day  pantoprazole    Tablet 40 milliGRAM(s) Oral before breakfast  senna 2 Tablet(s) Oral at bedtime  vancomycin  IVPB 1250 milliGRAM(s) IV Intermittent every 24 hours    MEDICATIONS  (PRN):  HYDROmorphone  Injectable 1 milliGRAM(s) IV Push every 3 hours PRN Severe Pain (7 - 10)  oxyCODONE    IR 10 milliGRAM(s) Oral every 4 hours PRN Moderate Pain (4 - 6)  polyethylene glycol 3350 17 Gram(s) Oral daily PRN Constipation    Vital Signs Last 24 Hrs  T(C): 36.4 (31 Aug 2022 11:48), Max: 36.7 (30 Aug 2022 20:33)  T(F): 97.5 (31 Aug 2022 11:48), Max: 98.1 (30 Aug 2022 20:33)  HR: 92 (31 Aug 2022 11:48) (91 - 97)  BP: 107/68 (31 Aug 2022 11:48) (100/67 - 124/77)  BP(mean): --  RR: 18 (31 Aug 2022 11:48) (18 - 18)  SpO2: 95% (31 Aug 2022 11:48) (95% - 97%)    Parameters below as of 31 Aug 2022 11:48  Patient On (Oxygen Delivery Method): nasal cannula        PE  NAD  Awake, alert  Anicteric, MMM  RRR  CTAB  Abd distended but improved  No c/c/e  No rash grossly                            12.2   7.93  )-----------( 41       ( 31 Aug 2022 06:02 )             37.6       08-31    135  |  95<L>  |  63<H>  ----------------------------<  144<H>  3.7   |  30  |  1.02    Ca    8.8      31 Aug 2022 06:02    TPro  5.3<L>  /  Alb  3.1<L>  /  TBili  0.6  /  DBili  x   /  AST  46<H>  /  ALT  25  /  AlkPhos  406<H>  08-30      Patient name: ZUHAIR NOONAN  YOB: 1950   Age: 72 (M)   MR#: [id number]  Study Date: 8/30/2022  [facility]  Study quality: Technically difficult  Referring Physician: Bharat Edmondson MD  Blood Pressure: 98/62 mmHg  Height: 183 cm  Weight: 109 kg  BSA: 2.3 m2  Heart Rate: 90 mmHg  ------------------------------------------------------------------------  PROCEDURE: Transthoracic echocardiogram with 2-D, M-Mode  and complete spectral and color flow Doppler. Verbal  consent was obtained for injection of  Ultrasonic Enhancing  Agent following a discussion of risks and benefits.  Following intravenous injection of Ultrasonic Enhancing  Agent, harmonic imaging was performed.  INDICATION: Edema, unspecified (R60.9)  ------------------------------------------------------------------------  Dimensions:    Normal Values:  LA:     3.7    2.0 - 4.0 cm  Ao:     4.0    2.0 - 3.8 cm  SEPTUM: 1.1    0.6 - 1.2 cm  PWT:    1.0    0.6 - 1.1 cm  LVIDd:  3.5    3.0 - 5.6 cm  LVIDs:  2.4    1.8 - 4.0 cm  Derived variables:  LVMI: 48 g/m2  RWT: 0.57  Fractional short: 31 %  EF (Modified Tovar Rule): 44 %Doppler Peak Velocity  (m/sec): AoV=0.9  ------------------------------------------------------------------------  Observations:  Mitral Valve: Mitral annular calcification, otherwise  normal mitral valve. No mitral regurgitation seen.  Aortic Valve/Aorta: Normal trileaflet aortic valve. Peak  transaortic valve gradient equals 3 mm Hg, mean transaortic  valve gradient equals 2 mm Hg, aortic valve velocity time  integral equals 15 cm. No aortic valve regurgitation seen.  Peak left ventricular outflow tract gradient equals 4 mm  Hg, mean gradient is equal to 2 mm Hg, LVOT velocity time  integral equals 15 cm.  Dilated Aortic Root: 4 cm.  Left Atrium: Left atrium not well visualized.  Left Ventricle: Endocardial visualization enhanced with  intravenous injection of Ultrasonic Enhancing Agent  (Lumason). Moderate segmental left ventricular systolic  dysfunction. There is septal, anterior, and lateral  hypokinesis. Normal left ventricular internal dimensions  and wall thicknesses. Unable to determine diastolic  function due to poor visualization of the left atrium.  Right Heart: Right atrium not well visualized. The right  ventricle is not well visualized. Normal tricuspid valve.  No tricuspid regurgitation. RVSP was not calculated due to  insufficient Doppler signal. Normal pulmonic valve.  Pericardium/Pleura: No pericardial effusion seen.  Hemodynamic: Estimated right atrial pressure is 8 mm Hg.  ------------------------------------------------------------------------  Conclusions:  1. Dilated Aortic Root: 4 cm.  2. Left atrium not well visualized.  3. Endocardial visualization enhanced with intravenous  injection of Ultrasonic Enhancing Agent (Lumason). Moderate  segmental left ventricular systolic dysfunction. There is  septal, anterior, and lateral hypokinesis.  4. Unable to determine diastolic function due to poor  visualization of the left atrium.  5. Right atrium not well visualized.  6. The right ventricle is not well visualized.  7. Normal tricuspid valve. No tricuspid regurgitation. RVSP  was not calculated due to insufficient Doppler signal.  *** No previous Echo exam.  ------------------------------------------------

## 2022-08-31 NOTE — CONSULT NOTE ADULT - SUBJECTIVE AND OBJECTIVE BOX
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    Of note patient was recently admitted to [facility] for SOB s/p left thoracentesis and was noted to be hyponatremic on that admission and was DC'd on salt tabs 1g BID, 1L fluid restriction and Lasix 40 mg for LE edema.  Patient is a 72 year old Male with PMHx of gallbladder cancer with metastasis to the peritoneum, right pleura, liver, hyponatremia, HLD who presents to [facility] from outside laboratory for hypotension, CHA and hyponatremia. Patient admits to abdominal distention, and was recently admitted for a thoracentesis at OSH. History obtained from patient and daugther at the bedside. Patient denies CP, palpitations. Denies headache or lightheadedness.     (26 Aug 2022 15:22)      PAST MEDICAL & SURGICAL HISTORY:  Gallbladder cancer    MEDICATIONS:  MEDICATIONS  (STANDING):  albumin human 25% IVPB 100 milliLiter(s) IV Intermittent every 6 hours  cefTRIAXone   IVPB 2000 milliGRAM(s) IV Intermittent every 24 hours  chlorhexidine 2% Cloths 1 Application(s) Topical daily  gabapentin 200 milliGRAM(s) Oral two times a day  midodrine. 20 milliGRAM(s) Oral three times a day  pantoprazole    Tablet 40 milliGRAM(s) Oral before breakfast  senna 2 Tablet(s) Oral at bedtime  vancomycin  IVPB 1250 milliGRAM(s) IV Intermittent every 24 hours      Allergies    No Known Allergies    Intolerances        FAMILY HISTORY:    Non-contributary for premature coronary disease or sudden cardiac death    SOCIAL HISTORY:    [ ] Non-smoker  [ ] Smoker  [ ] Alcohol    FLU VACCINE THIS YEAR STARTS IN AUGUST:  [ ] Yes    [ ] No    IF OVER 65 HAVE YOU EVER HAD A PNA VACCINE:  [ ] Yes    [ ] No       [ ] N/A      REVIEW OF SYSTEMS:  [ ]chest pain  [  ]shortness of breath  [  ]palpitations  [  ]syncope  [ ]near syncope [ ]upper extremity weakness   [ ] lower extremity weakness  [  ]diplopia  [  ]altered mental status   [  ]fevers  [ ]chills [ ]nausea  [ ]vomiting  [  ]dysphagia    [ ]abdominal pain  [ ]melena  [ ]BRBPR    [  ]epistaxis  [  ]rash    [ ]lower extremity edema        [X] All others negative	  [ ] Unable to obtain      LABS:	 	    CARDIAC MARKERS:                              12.2   7.93  )-----------( 41       ( 31 Aug 2022 06:02 )             37.6     Hb Trend: 12.2<--    08-31    135  |  95<L>  |  63<H>  ----------------------------<  144<H>  3.7   |  30  |  1.02    Ca    8.8      31 Aug 2022 06:02    TPro  5.3<L>  /  Alb  3.1<L>  /  TBili  0.6  /  DBili  x   /  AST  46<H>  /  ALT  25  /  AlkPhos  406<H>  08-30    Creatinine Trend: 1.02<--, 1.69<--, 2.37<--, 2.33<--, 1.70<--, 1.19<--    Coags:      proBNP:   Lipid Profile:   HgA1c:   TSH:         PHYSICAL EXAM:  T(C): 36.4 (08-31-22 @ 11:48), Max: 36.7 (08-30-22 @ 20:33)  HR: 92 (08-31-22 @ 11:48) (91 - 97)  BP: 107/68 (08-31-22 @ 11:48) (100/67 - 124/77)  RR: 18 (08-31-22 @ 11:48) (18 - 18)  SpO2: 95% (08-31-22 @ 11:48) (95% - 97%)  Wt(kg): --   BMI (kg/m2): 31.1 (08-31-22 @ 08:41)  I&O's Summary    30 Aug 2022 07:01  -  31 Aug 2022 07:00  --------------------------------------------------------  IN: 590 mL / OUT: 1600 mL / NET: -1010 mL    31 Aug 2022 07:01  -  31 Aug 2022 14:12  --------------------------------------------------------  IN: 120 mL / OUT: 400 mL / NET: -280 mL        Gen: Appears well in NAD  HEENT:  (-)icterus (-)pallor  CV: N S1 S2 1/6 HEVER (+)2 Pulses B/l  Resp:  Clear to auscultation B/L, normal effort  GI: (+) BS Soft, NT, ND  Lymph:  (-)Edema, (-)obvious lymphadenopathy  Skin: Warm to touch, Normal turgor  Psych: Appropriate mood and affect      TELEMETRY: 	      ECG:  	    RADIOLOGY:         CXR:     ASSESSMENT/PLAN: 	72y Male     C A R D I O L O G Y  *********************    DATE OF SERVICE: 08-31-22    HISTORY OF PRESENT ILLNESS: HPI:  Patient is a 71 y/o male with PMH of 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 Found to have acute B/L femoral DVT and LLL PE. Patient is s/p paracentesis with 4.9L removed on 8/29. TTE noted with EF 44%, moderate segmental LV dysfunction, septal/anterior/lateral wall hypokinesis, RV not well visualized. Cardiology consulted for further evaluation. Patient feeling slightly better after paracentesis but still having abdominal discomfort. Denies chest pain, SOB, palpitations, dizziness, or syncope.     PAST MEDICAL & SURGICAL HISTORY:  Gallbladder cancer    MEDICATIONS:  MEDICATIONS  (STANDING):  albumin human 25% IVPB 100 milliLiter(s) IV Intermittent every 6 hours  cefTRIAXone   IVPB 2000 milliGRAM(s) IV Intermittent every 24 hours  chlorhexidine 2% Cloths 1 Application(s) Topical daily  gabapentin 200 milliGRAM(s) Oral two times a day  midodrine. 20 milliGRAM(s) Oral three times a day  pantoprazole    Tablet 40 milliGRAM(s) Oral before breakfast  senna 2 Tablet(s) Oral at bedtime  vancomycin  IVPB 1250 milliGRAM(s) IV Intermittent every 24 hours      Allergies    No Known Allergies    Intolerances        FAMILY HISTORY:    Non-contributary for premature coronary disease or sudden cardiac death    SOCIAL HISTORY:    [x ] Non-smoker  [ ] Smoker  [ ] Alcohol    FLU VACCINE THIS YEAR STARTS IN AUGUST:  [ ] Yes    [ ] No    IF OVER 65 HAVE YOU EVER HAD A PNA VACCINE:  [ ] Yes    [ ] No       [ ] N/A      REVIEW OF SYSTEMS:  [ ]chest pain  [  ]shortness of breath  [  ]palpitations  [  ]syncope  [ ]near syncope [ ]upper extremity weakness   [ ] lower extremity weakness  [  ]diplopia  [  ]altered mental status   [  ]fevers  [ ]chills [ ]nausea  [ ]vomiting  [  ]dysphagia    [ x]abdominal pain  [ ]melena  [ ]BRBPR    [  ]epistaxis  [  ]rash    [ ]lower extremity edema        [X] All others negative	  [ ] Unable to obtain      LABS:	 	    CARDIAC MARKERS:                              12.2   7.93  )-----------( 41       ( 31 Aug 2022 06:02 )             37.6     Hb Trend: 12.2<--    08-31    135  |  95<L>  |  63<H>  ----------------------------<  144<H>  3.7   |  30  |  1.02    Ca    8.8      31 Aug 2022 06:02    TPro  5.3<L>  /  Alb  3.1<L>  /  TBili  0.6  /  DBili  x   /  AST  46<H>  /  ALT  25  /  AlkPhos  406<H>  08-30    Creatinine Trend: 1.02<--, 1.69<--, 2.37<--, 2.33<--, 1.70<--, 1.19<--    Coags:      proBNP:   Lipid Profile:   HgA1c:   TSH:         PHYSICAL EXAM:  T(C): 36.4 (08-31-22 @ 11:48), Max: 36.7 (08-30-22 @ 20:33)  HR: 92 (08-31-22 @ 11:48) (91 - 97)  BP: 107/68 (08-31-22 @ 11:48) (100/67 - 124/77)  RR: 18 (08-31-22 @ 11:48) (18 - 18)  SpO2: 95% (08-31-22 @ 11:48) (95% - 97%)  Wt(kg): --   BMI (kg/m2): 31.1 (08-31-22 @ 08:41)  I&O's Summary    30 Aug 2022 07:01  -  31 Aug 2022 07:00  --------------------------------------------------------  IN: 590 mL / OUT: 1600 mL / NET: -1010 mL    31 Aug 2022 07:01  -  31 Aug 2022 14:12  --------------------------------------------------------  IN: 120 mL / OUT: 400 mL / NET: -280 mL        Gen: Appears well in NAD  HEENT:  (-)icterus (-)pallor  CV: N S1 S2 1/6 HEVER (+)2 Pulses B/l  Resp:  Clear to auscultation B/L, normal effort  GI: (+) BS Soft, NT, ND  Lymph:  (-)Edema, (-)obvious lymphadenopathy  Skin: Warm to touch, Normal turgor  Psych: Appropriate mood and affect      TELEMETRY: SR 80-90	      ECG: Sinus Tachycardia at 105 bpm, no acute ST-T changes	    RADIOLOGY:         CXR: < from: Xray Chest 1 View AP/PA (08.26.22 @ 14:43) >  IMPRESSION:  Right chest wall MediPort tip in the region of the right atrium.  Bibasilar patchy airspace disease likelyatelectasis. Small left effusion.    < end of copied text >    < from: CT Chest w/ IV Cont (08.26.22 @ 14:43) >  IMPRESSION:    CHEST:  *  Pulmonary embolism within left lower lobar and segmental pulmonary   arteries.  *  Moderate right and small left pleural effusions.  *  Scattered nonspecific subcentimeter pulmonary nodules. Irregular   interlobular septal thickening in the right lung, possibly representing   lymphangitic spread of disease.  *  Enlarged heterogeneous thyroid gland with a mixed density leftthyroid   lobe nodule measuring 2.4 cm.    ABDOMEN AND PELVIS:  *  Scattered indeterminate liver lesions, presumably the patient's known   metastatic disease.  *  Findings consistent with known peritoneal carcinomatosis. Moderate to   large volume ascites.  *  Ill-defined lesion along the pancreatic tail, possibly reflecting   metastatic disease, with a primary pancreatic neoplasm not excluded.  *  Ovoid thickening of the left ventral abdominal musculature measuring   2.3 x 3.3 x 3.7 cm, potentially representing an implant, or possibly an   intramuscular hematoma.  *  Linear metallic density in the gastric fundus, possibly an endoclip.   Correlate with patient history.      Findings of pulmonary embolism were discussed with Dr. Argueta  8/26/2022   3:49 PM by Dr. Staley with read back confirmation.    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 Found to have acute B/L femoral DVT and LLL PE. Patient is s/p paracentesis with 4.9L removed on 8/29. TTE noted with EF 44%, moderate segmental LV dysfunction, septal/anterior/lateral wall hypokinesis, RV not well visualized. Cardiology consulted for further evaluation.    - Elevated troponin now downtrending likely demand ischemia in setting of hypotension and PE as well as CHA - no active chest pain or acute ischemic EKG changes - do not suspect ACS  - Vascular/IR eval noted regarding PE/DVT - no AC given thrombocytopenia, plan for IVC filter on 9/1  - Not a candidate for ischemic eval given thrombocytopenia - unable to be on APT/AC  - Recommend medical management of moderate LV dysfunction with beta blockers and ACE/ARB however unable to start due to hypotension requiring midodrine  - Diuretics on hold per renal. CHA/Hyponatremia resolving.  - No further inpatient cardiac w/u expected at this time    Gibran Marina PA-C  Pager: [phone]

## 2022-08-31 NOTE — PROGRESS NOTE ADULT - SUBJECTIVE AND OBJECTIVE BOX
Patient is a 72y old  Male who presents with a chief complaint of Hyponatremia and CHA (31 Aug 2022 10:34)      SUBJECTIVE / OVERNIGHT EVENTS:  Patient seen and examined.   No acute complaints.       Vital Signs Last 24 Hrs  T(C): 36.4 (31 Aug 2022 11:48), Max: 36.7 (30 Aug 2022 20:33)  T(F): 97.5 (31 Aug 2022 11:48), Max: 98.1 (30 Aug 2022 20:33)  HR: 92 (31 Aug 2022 11:48) (91 - 97)  BP: 107/68 (31 Aug 2022 11:48) (100/67 - 124/77)  BP(mean): --  RR: 18 (31 Aug 2022 11:48) (18 - 18)  SpO2: 95% (31 Aug 2022 11:48) (95% - 97%)    Parameters below as of 31 Aug 2022 11:48  Patient On (Oxygen Delivery Method): nasal cannula      I&O's Summary    30 Aug 2022 07:01  -  31 Aug 2022 07:00  --------------------------------------------------------  IN: 590 mL / OUT: 1600 mL / NET: -1010 mL    31 Aug 2022 07:01  -  31 Aug 2022 12:36  --------------------------------------------------------  IN: 0 mL / OUT: 400 mL / NET: -400 mL        PE:  GENERAL: NAD, AAOx2  CHEST/LUNG: CTABL, No wheeze  HEART: Regular rate and rhythm; no murmur  ABDOMEN: Soft, 3 points of indurated lessions-- small erythema, no warmth, Bowel sounds present  : rodriguez  EXTREMITIES:  2+ Peripheral Pulses, +2 LE edema  NEURO: No focal deficits    LABS:                        12.2   7.93  )-----------( 41       ( 31 Aug 2022 06:02 )             37.6     08-31    135  |  95<L>  |  63<H>  ----------------------------<  144<H>  3.7   |  30  |  1.02    Ca    8.8      31 Aug 2022 06:02    TPro  5.3<L>  /  Alb  3.1<L>  /  TBili  0.6  /  DBili  x   /  AST  46<H>  /  ALT  25  /  AlkPhos  406<H>  08-30      CAPILLARY BLOOD GLUCOSE                RADIOLOGY & ADDITIONAL TESTS:    Imaging Personally Reviewed:  [x] YES  [ ] NO    Consultant(s) Notes Reviewed:  [x] YES  [ ] NO      MEDICATIONS  (STANDING):  albumin human 25% IVPB 100 milliLiter(s) IV Intermittent every 6 hours  cefTRIAXone   IVPB 2000 milliGRAM(s) IV Intermittent every 24 hours  chlorhexidine 2% Cloths 1 Application(s) Topical daily  gabapentin 200 milliGRAM(s) Oral two times a day  midodrine. 20 milliGRAM(s) Oral three times a day  pantoprazole    Tablet 40 milliGRAM(s) Oral before breakfast  senna 2 Tablet(s) Oral at bedtime  vancomycin  IVPB 1250 milliGRAM(s) IV Intermittent every 24 hours    MEDICATIONS  (PRN):  HYDROmorphone  Injectable 1 milliGRAM(s) IV Push every 3 hours PRN Severe Pain (7 - 10)  oxyCODONE    IR 10 milliGRAM(s) Oral every 4 hours PRN Moderate Pain (4 - 6)  polyethylene glycol 3350 17 Gram(s) Oral daily PRN Constipation      Care Discussed with Consultants/Other Providers [x] YES  [ ] NO    HEALTH ISSUES - PROBLEM Dx:  Hyponatremia    Suspected pulmonary embolism    Suspected deep vein thrombosis (DVT)    Pain due to neoplasm    Gallbladder cancer    Weakness    Palliative care encounter    Pleural effusion    Pulmonary embolism    Thrombocytopenia    Ascites    Shortness of breath    Constipation    CHA (acute kidney injury)    DVT, lower extremity

## 2022-08-31 NOTE — PROVIDER CONTACT NOTE (OTHER) - ACTION/TREATMENT ORDERED:
Straight cath patient
Pt assessed and Vanco dose slowed down during administration.
Administered Midodrine, wait 15 minutes then administer dilaudid.
one time order of midodrine

## 2022-08-31 NOTE — CONSULT NOTE ADULT - NS ATTEND AMEND GEN_ALL_CORE FT
Patient seen and examined.  Agree with above.   -Elevated troponin now downtrending likely demand ischemia in setting of hypotension and PE as well as CHA - no active chest pain or acute ischemic EKG changes - do not suspect ACS  -would medically manage cardiomyopathy for now given overall prognosis, severe thrombocytopenia, and acute dvt/pe - the risks of invasive cv testing would likely outweigh the benefits at this time  -follow up palliative care regarding goc  -heme onc follow up for metastatic gb ca    Froilan Oshea MD
overall prognosis poor  suggest palliation  maintain sat>90%, wean to 3lnc

## 2022-08-31 NOTE — PROGRESS NOTE ADULT - ASSESSMENT
72 year old man with metastatic Gb cancer and malignant ascites, admitted with hypotension, hyponatremia and CHA.     1) malignant ascites    - s/p LLQ Paracentesis performed. 4.9 liters of clear yellow fluid removed 8/29  - pending [facility] discussion would consider a long-term peritoneal catheter, ie. Aspira drain    2) metastatis GB cancer  - per oncology     3) PE  - unable to anticoagulate given thrombocytopenia     4) CHA  - nephrology on board    5) Leukocytosis   - per primary team     [facility]  Gastroenterology and Hepatology  [address] [place], [place]  Office: [phone]

## 2022-08-31 NOTE — PROGRESS NOTE ADULT - PROBLEM SELECTOR PLAN 1
Patient with hyponatremia of 127 on presentation likely volume overloaded vs SIADH. Per daughter, was 127 at [facility] earlier this month and patient has been drinking lots of water at home    Sodium improved to 135 after paracentesis with concurrent albumin. Continue with fluid restriction to <1L.  C/w albumin BID for now. Whenever patient is to get repeat paracentesis, he will need concurrent albumin unit in 1:1 ratio. It will help with hemodynamics and prevent intravascular volume depletion.  AM Cortisol was 33. Order TSH and obtain Urinalysis, Urine lytes, and Uprot/creat. Hold diuretics for now as low BP

## 2022-08-31 NOTE — PROGRESS NOTE ADULT - SUBJECTIVE AND OBJECTIVE BOX
[facility], Division of Infectious Diseases  DAHIANA Elaine Y. Patel, S. Shah, G. Casimir  [phone]  ([phone] - weekdays after 5pm and weekends)    Name: ZUHAIR NOONAN  Age/Gender: 72y Male  MRN: [id number]    Interval History:  Patient seen and examined this morning.   Returned from having abdominal U/S.   Denies fever, chills, pain or any new complaints.   Notes reviewed. Afebrile   Allergies: No Known Allergies      Objective:  Vitals:   T(F): 97.6 (08-31-22 @ 04:32), Max: 98.2 (08-30-22 @ 11:48)  HR: 91 (08-31-22 @ 04:32) (90 - 97)  BP: 124/77 (08-31-22 @ 04:32) (100/67 - 124/77)  RR: 18 (08-31-22 @ 04:32) (18 - 18)  SpO2: 97% (08-31-22 @ 04:32) (97% - 97%)  Physical Examination:  General: no acute distress  HEENT: NC/AT, anicteric, neck supple  Respiratory: no acc muscle use, breathing comfortably  Cardiovascular: S1 and S2 present  Gastrointestinal: normal appearing, nondistended  Extremities: LE edema, no cyanosis  Skin: Prior abd incisions with induration, blanching erythema with slight TTP and warmth  Lines: R chest wall port accessed with no erythema or TTP    Laboratory Studies:  CBC:                       12.2   7.93  )-----------( 41       ( 31 Aug 2022 06:02 )             37.6     WBC Trend:  7.93 08-31-22 @ 06:02  10.27 08-30-22 @ 07:04  12.97 08-29-22 @ 07:43  13.19 08-28-22 @ 07:19  10.48 08-27-22 @ 07:20  9.30 08-26-22 @ 13:06    CMP: 08-31    135  |  95<L>  |  63<H>  ----------------------------<  144<H>  3.7   |  30  |  1.02    Ca    8.8      31 Aug 2022 06:02    TPro  5.3<L>  /  Alb  3.1<L>  /  TBili  0.6  /  DBili  x   /  AST  46<H>  /  ALT  25  /  AlkPhos  406<H>  08-30    Creatinine, Serum: 1.02 mg/dL (08-31-22 @ 06:02)  Creatinine, Serum: 1.69 mg/dL (08-30-22 @ 07:04)  Creatinine, Serum: 2.37 mg/dL (08-29-22 @ 07:46)  Creatinine, Serum: 2.33 mg/dL (08-28-22 @ 07:14)  Creatinine, Serum: 1.70 mg/dL (08-27-22 @ 07:09)  Creatinine, Serum: 1.19 mg/dL (08-26-22 @ 19:15)  Creatinine, Serum: 1.25 mg/dL (08-26-22 @ 13:06)    LIVER FUNCTIONS - ( 30 Aug 2022 07:04 )  Alb: 3.1 g/dL / Pro: 5.3 g/dL / ALK PHOS: 406 U/L / ALT: 25 U/L / AST: 46 U/L / GGT: x           Microbiology: reviewed   Culture - Fungal, Body Fluid (collected 08-29-22 @ 14:50)  Source: Peritoneal Peritoneal Fluid  Preliminary Report (08-30-22 @ 07:37):    Testing in progress    Culture - Body Fluid with Gram Stain (collected 08-29-22 @ 14:50)  Source: Peritoneal Peritoneal Fluid  Gram Stain (08-29-22 @ 20:58):    polymorphonuclear leukocytes seen    No organisms seen    by cytocentrifuge  Preliminary Report (08-30-22 @ 15:41):    No growth    Culture - Blood (collected 08-27-22 @ 01:55)  Source: .Blood Blood-Peripheral  Preliminary Report (08-28-22 @ 04:02):    No growth to date.    Culture - Blood (collected 08-27-22 @ 01:40)  Source: .Blood Blood-Peripheral  Preliminary Report (08-28-22 @ 04:02):    No growth to date.    Culture - Urine (collected 08-26-22 @ 17:07)  Source: Clean Catch Clean Catch (Midstream)  Final Report (08-27-22 @ 23:41):    <10,000 CFU/mL Normal Urogenital Eliana    Radiology: reviewed   US Abdomen Limited (08.31.22 @ 08:55) >IMPRESSION: 4 cm and 10 cm fluid collections in the right and left abdominal sites that track to the skin. Upon interview with the patient, these skin sites likely correspond to previous paracentesis catheter insertion sites. The collections appear amenable to aspiration.2 cm septated collection at the area of umbilicus. This collection does not appear amenable to aspiration.    Medications:  albumin human 25% IVPB 100 milliLiter(s) IV Intermittent every 6 hours  cefTRIAXone   IVPB 2000 milliGRAM(s) IV Intermittent every 24 hours  chlorhexidine 2% Cloths 1 Application(s) Topical daily  gabapentin 200 milliGRAM(s) Oral two times a day  HYDROmorphone  Injectable 1 milliGRAM(s) IV Push every 3 hours PRN  midodrine. 20 milliGRAM(s) Oral three times a day  oxyCODONE    IR 10 milliGRAM(s) Oral every 4 hours PRN  pantoprazole    Tablet 40 milliGRAM(s) Oral before breakfast  polyethylene glycol 3350 17 Gram(s) Oral daily PRN  senna 2 Tablet(s) Oral at bedtime  vancomycin  IVPB 1250 milliGRAM(s) IV Intermittent every 24 hours    Antimicrobials:  cefTRIAXone   IVPB 2000 milliGRAM(s) IV Intermittent every 24 hours  vancomycin  IVPB 1250 milliGRAM(s) IV Intermittent every 24 hours

## 2022-08-31 NOTE — PROGRESS NOTE ADULT - SUBJECTIVE AND OBJECTIVE BOX
[facility] Division of Kidney Diseases & Hypertension  FOLLOW UP NOTE  [phone]--------------------------------------------------------------------------------  Chief Complaint:Hypotension    72 y.o M w/ hx of recent dx of gallbladder cancer with metastasis who presents to hospital for hypotension, found to have creatinine of 1.25, BUN (unknown baseline). Per daughter, patient diagnosed with gallbladder cancer at [facility] in July when he was undergoing a planned gall bladder removal. Patient has received 1 dose of gemcitabine with Dr. Tito Valentin, [facility]. Patient then had worsening SOB beginning this month, and to [facility] for SOB s/p left thoracentesis and placed on salt tabs 1g BID, 1L fluid restriction and lasix 40 mg for LE edema. Currently, he reports to having poor PO intake, eats ice cream and drinks lots of water and endorses polyuria. He was compliant with his medications. S/p paracentesis on 8/29      24 hour events/subjective:  Patient's creatinine downtrended to 1.06. Planning for IVC filter given his LE DVT but unable to get AC in setting of thrombocytopenia. Patient also noted to have abdominal wall cellulitis     PAST HISTORY  --------------------------------------------------------------------------------  No significant changes to PMH, PSH, FHx, SHx, unless otherwise noted    ALLERGIES & MEDICATIONS  --------------------------------------------------------------------------------  Allergies    No Known Allergies    Intolerances      Standing Inpatient Medications  albumin human 25% IVPB 100 milliLiter(s) IV Intermittent every 6 hours  cefTRIAXone   IVPB 2000 milliGRAM(s) IV Intermittent every 24 hours  chlorhexidine 2% Cloths 1 Application(s) Topical daily  gabapentin 200 milliGRAM(s) Oral two times a day  midodrine. 20 milliGRAM(s) Oral three times a day  pantoprazole    Tablet 40 milliGRAM(s) Oral before breakfast  senna 2 Tablet(s) Oral at bedtime  vancomycin  IVPB 1250 milliGRAM(s) IV Intermittent every 24 hours    PRN Inpatient Medications  HYDROmorphone  Injectable 1 milliGRAM(s) IV Push every 3 hours PRN  oxyCODONE    IR 10 milliGRAM(s) Oral every 4 hours PRN  polyethylene glycol 3350 17 Gram(s) Oral daily PRN      REVIEW OF SYSTEMS  --------------------------------------------------------------------------------  Gen: No fevers/chills +fatigue, poor oral intake   Skin: No rashes  Head/Eyes/Ears: Normal hearing,   Respiratory: No dyspnea, +cough  CV: No chest pain  GI: + abdominal pain, diarrhea  : No dysuria, hematuria  MSK: No  edema  Heme: No easy bruising or bleeding  Psych: No significant depression      All other systems were reviewed and are negative, except as noted.    VITALS/PHYSICAL EXAM  --------------------------------------------------------------------------------  T(C): 36.4 (08-31-22 @ 11:48), Max: 36.7 (08-30-22 @ 20:33)  HR: 92 (08-31-22 @ 11:48) (91 - 97)  BP: 107/68 (08-31-22 @ 11:48) (100/67 - 124/77)  RR: 18 (08-31-22 @ 11:48) (18 - 18)  SpO2: 95% (08-31-22 @ 11:48) (95% - 97%)  Wt(kg): --    Weight (kg): 104 (08-31-22 @ 08:41)      08-30-22 @ 07:01  -  08-31-22 @ 07:00  --------------------------------------------------------  IN: 590 mL / OUT: 1600 mL / NET: -1010 mL    08-31-22 @ 07:01  -  08-31-22 @ 13:58  --------------------------------------------------------  IN: 120 mL / OUT: 400 mL / NET: -280 mL      Physical Exam:  	Gen: NAD, well-appearing  	HEENT: PERRL, supple neck, clear oropharynx  	Pulm: Diminished airflow in bibasilar lungs  	CV: RRR, S1S2;  	Back: No spinal or CVA tenderness  	Abd: +BS, soft, erythema, band like 2-3 cm lesions with nodular texture   	: No suprapubic tenderness                      Extremities:+1 pitting edema                       Neuro: No focal deficits, intact gait  	Skin: Warm, without rashes      LABS/STUDIES  --------------------------------------------------------------------------------              12.2   7.93  >-----------<  41       [08-31-22 @ 06:02]              37.6     135  |  95  |  63  ----------------------------<  144      [08-31-22 @ 06:02]  3.7   |  30  |  1.02        Ca     8.8     [08-31-22 @ 06:02]    TPro  5.3  /  Alb  3.1  /  TBili  0.6  /  DBili  x   /  AST  46  /  ALT  25  /  AlkPhos  406  [08-30-22 @ 07:04]          Creatinine Trend:  SCr 1.02 [08-31 @ 06:02]  SCr 1.69 [08-30 @ 07:04]  SCr 2.37 [08-29 @ 07:46]  SCr 2.33 [08-28 @ 07:14]  SCr 1.70 [08-27 @ 07:09]    Urinalysis - [08-27-22 @ 07:20]      Color Yellow / Appearance Slightly Turbid / SG 1.038 / pH 5.5      Gluc Negative / Ketone Negative  / Bili Negative / Urobili Negative       Blood Small / Protein 30 mg/dL / Leuk Est Negative / Nitrite Negative      RBC 5 / WBC 15 / Hyaline 16 / Gran  / Sq Epi  / Non Sq Epi 14 / Bacteria Negative    Urine Creatinine 88      [08-28-22 @ 14:29]  Urine Protein 95      [08-27-22 @ 16:47]  Urine Sodium 8      [08-28-22 @ 14:29]  Urine Potassium 58      [08-28-22 @ 14:29]  Urine Chloride <20      [08-28-22 @ 14:29]  Urine Phosphorus 49.9      [08-28-22 @ 14:28]  Urine Osmolality 423      [08-28-22 @ 14:29]    TSH 1.80      [08-27-22 @ 07:22]  Lipid: chol 98, , HDL 37, LDL --      [08-27-22 @ 07:21]    HCV 0.06, Nonreact      [08-27-22 @ 07:22]

## 2022-08-31 NOTE — PROGRESS NOTE ADULT - ASSESSMENT
71yo M PMHx gallbladder cancer with metastasis to the peritoneum, right pleura, liver, hyponatremia who presents to [facility] from outside laboratory for hypotension, CHA and hyponatremia. Patient admits to abdominal distention, and was recently admitted for a thoracentesis.    # Hyponatremia  # hypotension  # Volume overload evidenced by ascites/ effusion 2/2 GB ca w/ mets vs Systolic CHF    # CHA  Diet with fluid restriction to 1L   renal following  creat improving  cont midodrine borderline BP  sp paracentesis 8/29 4.9 L removed  cont albumin   leukocytosis downtrending  monitor erythema on abd  strict I&O   daily weights   echo noted with TTE- moderate systolic dysfunction with segmental wma   may eventually need to be on diuretics   cardiology consult     #Abdominal Wall Cellulitis w/ possible abscess   -abd ult noted with possible fluid collections    -no fever   -IR consult for possible aspiration   -c/w ceftriaxone and vancomcyin per ID     #acute pulmonary embolism & DVT  Unable to AC in view of thrombocytopenia & malignancy  TTE- moderate systolic dysfunction with segmental wma   IR consult for IVC filter  vascular cardiology fu    # Stage IV Ca Gallbladder diffuse mets  # Thrombocytopenia Likely due to Chemotherapy   # transaminitis  outpt following Dr. Tito [facility]in of [facility]  sp Gemcitabine / Durvalumab ([place] 8/19/2022), heme/onc following  appreciate palliative care discussion with family - would like to pursue all measures, full code  transfuse platelets if <10K or <50K with bleeding or prior to procedures --> Per IR, goal of Platelets is > 30K for paracentesis   statin on hold    Hold off on pharm DVT PPX for now    gabino Slaughter [phone] today    PCP Dr. Patricia Goddard & Dr Davies    Please contact with any questions or concerns [phone].

## 2022-08-31 NOTE — PROGRESS NOTE ADULT - SUBJECTIVE AND OBJECTIVE BOX
Follow-up Pulm Progress Note    O2 sats 99% on 2LNC  Lowered to 1LNC now  Denies CP, SOB    Medications:  MEDICATIONS  (STANDING):  albumin human 25% IVPB 100 milliLiter(s) IV Intermittent every 6 hours  cefTRIAXone   IVPB 2000 milliGRAM(s) IV Intermittent every 24 hours  chlorhexidine 2% Cloths 1 Application(s) Topical daily  gabapentin 200 milliGRAM(s) Oral two times a day  midodrine. 20 milliGRAM(s) Oral three times a day  pantoprazole    Tablet 40 milliGRAM(s) Oral before breakfast  senna 2 Tablet(s) Oral at bedtime  vancomycin  IVPB 1250 milliGRAM(s) IV Intermittent every 24 hours    MEDICATIONS  (PRN):  HYDROmorphone  Injectable 1 milliGRAM(s) IV Push every 3 hours PRN Severe Pain (7 - 10)  oxyCODONE    IR 10 milliGRAM(s) Oral every 4 hours PRN Moderate Pain (4 - 6)  polyethylene glycol 3350 17 Gram(s) Oral daily PRN Constipation    Vital Signs Last 24 Hrs  T(C): 36.4 (31 Aug 2022 04:32), Max: 36.8 (30 Aug 2022 11:48)  T(F): 97.6 (31 Aug 2022 04:32), Max: 98.2 (30 Aug 2022 11:48)  HR: 91 (31 Aug 2022 04:32) (90 - 97)  BP: 124/77 (31 Aug 2022 04:32) (100/67 - 124/77)  BP(mean): --  RR: 18 (31 Aug 2022 04:32) (18 - 18)  SpO2: 97% (31 Aug 2022 04:32) (97% - 97%)    Parameters below as of 31 Aug 2022 04:32  Patient On (Oxygen Delivery Method): nasal cannula    08-30 @ 07:01  -  08-31 @ 07:00  --------------------------------------------------------  IN: 590 mL / OUT: 1600 mL / NET: -1010 mL      LABS:                        12.2   7.93  )-----------( 41       ( 31 Aug 2022 06:02 )             37.6     08-31    135  |  95<L>  |  63<H>  ----------------------------<  144<H>  3.7   |  30  |  1.02    Ca    8.8      31 Aug 2022 06:02    TPro  5.3<L>  /  Alb  3.1<L>  /  TBili  0.6  /  DBili  x   /  AST  46<H>  /  ALT  25  /  AlkPhos  406<H>  08-30    Fluid characteristics  -- 08-29 @ 14:50  pH --    tprot 3.6    Cell count  Appearance Cloudy  Fluid type --  BF lymph 45  color Yellow  eosinophil --  PMN --  Mesothelial 1  Monocyte 11  Other body cells --    CULTURES:     Culture - Blood (collected 08-27-22 @ 01:55)  Source: .Blood Blood-Peripheral  Preliminary Report (08-28-22 @ 04:02):    No growth to date.    Culture - Blood (collected 08-27-22 @ 01:40)  Source: .Blood Blood-Peripheral  Preliminary Report (08-28-22 @ 04:02):    No growth to date.    Culture - Urine (collected 08-26-22 @ 17:07)  Source: Clean Catch Clean Catch (Midstream)  Final Report (08-27-22 @ 23:41):    <10,000 CFU/mL Normal Urogenital Eliana    Culture - Body Fluid with Gram Stain (collected 08-29-22 @ 14:50)  Source: Peritoneal Peritoneal Fluid  Gram Stain (08-29-22 @ 20:58):    polymorphonuclear leukocytes seen    No organisms seen    by cytocentrifuge  Preliminary Report (08-30-22 @ 15:41):    No growth      Culture - Fungal, Body Fluid (collected 08-29-22 @ 14:50)  Source: Peritoneal Peritoneal Fluid  Preliminary Report (08-30-22 @ 07:37):    Testing in progress    Physical Examination:  PULM: Decreased BS at bases  CVS: S1, S2 heard    RADIOLOGY REVIEWED  CT chest: < from: CT Chest w/ IV Cont (08.26.22 @ 14:43) >    FINDINGS:  Streak artifact related the patient's upper extremities limits portions   of the exam, most prominently within the abdomen.    CHEST:  LUNGS, PLEURA, AND LARGE AIRWAYS: Patent central airways. Moderate right   and small left pleural effusions with associated compressive atelectasis.   Scattered bilateral pulmonary nodules, with example in the left upper   lobe measuring 4 mm (series 3 image 124), and example in the right upper   lobe measuring 4 mm (series 3 image 155). Mild interlobular septal   thickening in the right lung, which appears somewhat irregular, possibly   representing lymphangitic spread of disease.  VESSELS: Right chest wall port with catheter tip in the right atrium.   Atherosclerotic changes. Filling defect within the left lower lobar and   segmental pulmonary arteries, concerning for pulmonary embolism (series   601 image 85).  HEART: Heart size is normal. Mitral annular calcification. No pericardial   effusion.  MEDIASTINUM AND DARYL: No lymphadenopathy.  CHEST WALL AND LOWER NECK: Enlarged heterogeneous thyroid gland with   mixed density left thyroid nodule measuring approximate 2.4 cm (series 2  image 2).    ABDOMEN AND PELVIS:    LIVER: Scattered indeterminate hypodense liver lesions, with a larger   example at the dome measuring 4.0 cm. Another example measuring   approximately 2.6 cm at the posterior dome has an associated coarse   calcification.  BILE DUCTS: Normal caliber.  GALLBLADDER: Hyperdensity within the gallbladder may represent sludge, or   possibly the patient's known malignancy.  SPLEEN: Within normal limits.  PANCREAS: Ill-defined hypoattenuating lesion along the pancreatic tail   measuring approximately 3.8 x 2.1 cm (series 2 image 75). Abnormal tissue   extends into the splenic hilum with likely encasement of splenic vessels   and abuts the adjacent stomach.  ADRENALS: Within normal limits.  KIDNEYS/URETERS: A fewpunctate nonobstructing calculi in the upper pole   of the right kidney. No hydronephrosis.    Portions of the pelvis are obscured by streak artifact from a left hip   prosthesis.    BLADDER: Partially obscured by streak artifact; grossly within normal   limits.  REPRODUCTIVE ORGANS: Partially obscured by streak artifact. The prostate   is normal in size.    BOWEL: Linear metallic density measuring approximately 2 cm in length at   the gastric fundus. Colonic diverticulosis. No bowel obstruction.  PERITONEUM: Moderate to large volume ascites. Infiltration of the   peritoneum with appearance of omental caking. Sites of irregular   peritoneal thickening with nodularity, with example in the left lower   quadrant (series 2 image 132).  VESSELS: Atherosclerotic changes.  RETROPERITONEUM/LYMPH NODES: No lymphadenopathy.  ABDOMINAL WALL: Small fat-containing left inguinal hernia. Ovoid   thickening of the left ventral abdominal wall musculature measuring 2.3 x   3.3 x 3.7 cm (series 2 image 127). Subcutaneous infiltration in the upper   ventral abdominal wall.  BONES: Left hip arthroplasty. Degenerative changes. Grade 1   anterolisthesis of L4 over L5. At least moderate central canal narrowing   at L4-L5. Calcified bodies extending inferiorly below the left   glenohumeral joint. Calcified body adjacent to the right glenohumeral   joint.    IMPRESSION:    CHEST:  *  Pulmonary embolism within left lower lobar and segmental pulmonary   arteries.  *  Moderate right and small left pleural effusions.  *  Scattered nonspecific subcentimeter pulmonary nodules. Irregular   interlobular septal thickening in the right lung, possibly representing   lymphangitic spread of disease.  *  Enlarged heterogeneous thyroid gland with a mixed density leftthyroid   lobe nodule measuring 2.4 cm.    ABDOMEN AND PELVIS:  *  Scattered indeterminate liver lesions, presumably the patient's known   metastatic disease.  *  Findings consistent with known peritoneal carcinomatosis. Moderate to   large volume ascites.  *  Ill-defined lesion along the pancreatic tail, possibly reflecting   metastatic disease, with a primary pancreatic neoplasm not excluded.  *  Ovoid thickening of the left ventral abdominal musculature measuring   2.3 x 3.3 x 3.7 cm, potentially representing an implant, or possibly an   intramuscular hematoma.  *  Linear metallic density in the gastric fundus, possibly an endoclip.   Correlate with patient history.      Findings of pulmonary embolism were discussed with Dr. Argueta  8/26/2022   3:49 PM by Dr. Staley with read back confirmation.    --- End of Report ---    < end of copied text >      < from: VA Duplex Lower Ext Vein Scan, Bilat (08.29.22 @ 16:34) >  FINDINGS:    RIGHT:    There is acute, occlusive DVT affecting the right common femoral, deep   femoral, femoral and popliteal veins, above the knee.    The right posterior tibial peroneal trunk, and the posterior tibial,   peroneal and gastrocnemius veins are also thrombosed, below the knee.    LEFT:    And there is acute, occlusive DVT affecting the left common femoral vein   and the left femoral vein in the proximal thigh.    The left femoral vein in the distal thigh and the popliteal vein are   patent and free of thrombus.    The left posterior tibial and peroneal veins are patent and free of   thrombus.      IMPRESSION:    There is acute, occlusive DVT affecting the right common femoral, deep   femoral, femoral and popliteal veins, above the knee.  The right posterior tibial peroneal trunk, and the posterior tibial,   peroneal and gastrocnemius veins are also thrombosed, below the knee.    There is acute, occlusive DVT affecting the left common femoral vein and   the left femoral vein in the proximal thigh.    < from: TTE with Doppler (w/Cont) (08.30.22 @ 12:22) >  Dimensions:    Normal Values:  LA:     3.7    2.0 - 4.0 cm  Ao:     4.0    2.0 - 3.8 cm  SEPTUM: 1.1    0.6 - 1.2 cm  PWT:    1.0    0.6 - 1.1 cm  LVIDd:  3.5    3.0 - 5.6 cm  LVIDs:  2.4    1.8 - 4.0 cm  Derived variables:  LVMI: 48 g/m2  RWT: 0.57  Fractional short: 31 %  EF (Modified Tovar Rule): 44 %Doppler Peak Velocity  (m/sec): AoV=0.9  ------------------------------------------------------------------------  Observations:  Mitral Valve: Mitral annular calcification, otherwise  normal mitral valve. No mitral regurgitation seen.  Aortic Valve/Aorta: Normal trileaflet aortic valve. Peak  transaortic valve gradient equals 3 mm Hg, mean transaortic  valve gradient equals 2 mm Hg, aortic valve velocity time  integral equals 15 cm. No aortic valve regurgitation seen.  Peak left ventricular outflow tract gradient equals 4 mm  Hg, mean gradient is equal to 2 mm Hg, LVOT velocity time  integral equals 15 cm.  Dilated Aortic Root: 4 cm.  Left Atrium: Left atrium not well visualized.  Left Ventricle: Endocardial visualization enhanced with  intravenous injection of Ultrasonic Enhancing Agent  (Lumason). Moderate segmental left ventricular systolic  dysfunction. There is septal, anterior, and lateral  hypokinesis. Normal left ventricular internal dimensions  and wall thicknesses. Unable to determine diastolic  function due to poor visualization of the left atrium.  Right Heart: Right atrium not well visualized. The right  ventricle is not well visualized. Normal tricuspid valve.  No tricuspid regurgitation. RVSP was not calculated due to  insufficient Doppler signal. Normal pulmonic valve.  Pericardium/Pleura: No pericardial effusionseen.  Hemodynamic: Estimated right atrial pressure is 8 mm Hg.  ------------------------------------------------------------------------  Conclusions:  1. Dilated Aortic Root: 4 cm.  2. Left atrium not well visualized.  3. Endocardial visualization enhanced with intravenous  injection of Ultrasonic Enhancing Agent (Lumason). Moderate  segmental left ventricular systolic dysfunction. There is  septal, anterior, and lateral hypokinesis.  4. Unable to determine diastolic function due to poor  visualization of the left atrium.  5. Right atrium not well visualized.  6. The right ventricle is not well visualized.  7. Normal tricuspid valve. No tricuspid regurgitation. RVSP  was not calculated due to insufficient Doppler signal.  *** No previous Echo exam.    < end of copied text >

## 2022-08-31 NOTE — PROVIDER CONTACT NOTE (OTHER) - RECOMMENDATIONS
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
PA notified and aware, Straight cath pt.
PA notified and made aware
notify provider

## 2022-08-31 NOTE — PROGRESS NOTE ADULT - ATTENDING COMMENTS
acute kidney injury - likely from poor renal perfusion in the setting of portal hypertension. possible component of abdominal compartment syndrome. agree with paracentesis- please give albumin 1:1. agree with midodrine. he was also very hypotensive on arrival and may have had some tubular injury.     Hyponatremia- sec to above + impaired free water excretion from renal failure.     paracentesis   maintain a MAP ~65-70  Albumin with paracentesis     zahida king  nephrology attending   please contact me on TEAMS   Office- [phone]
zahida king  nephrology attending   please contact me on TEAMS   Office- [phone]
much improved renal function after paracentesis likely abdominal compartment syndrome as well   albumin as above     zahida king  nephrology attending   please contact me on TEAMS   Office- [phone]
Metastatic biliary cancer with ascites, hypotension  CHA   Hyponatremia  Worsening CHA   Stop ureNa bc of worsening CHA   Recommend Odell cath, Continue albumin, midodrine, palliative paracentesis  GOC in progress  Poor prognosis    Tahira Juarez MD  Off: [phone]  contact me on teams    (After 5 pm or on weekends please page the on-call fellow/attending, can check [email or web address] for schedule. Login is good william, schedule under [facility] medicine, psych, derm)

## 2022-08-31 NOTE — PROVIDER CONTACT NOTE (OTHER) - ASSESSMENT
BP: 96/66, pain of 10/10 in lower abdomen.
Pt A&OX4. VS as noted. No cp or SOB. Pt complains of abd pain 10/10
Lower abdominal pain
Redness throughout body. Patient denies itchiness and discomfort.

## 2022-08-31 NOTE — PROGRESS NOTE ADULT - SUBJECTIVE AND OBJECTIVE BOX
INTERVAL HPI/OVERNIGHT EVENTS:    pt seen and examined. Denies abdominal pain, n/v. He is eating. No new GI complaints.     MEDICATIONS  (STANDING):  albumin human 25% IVPB 100 milliLiter(s) IV Intermittent every 6 hours  cefTRIAXone   IVPB 2000 milliGRAM(s) IV Intermittent every 24 hours  chlorhexidine 2% Cloths 1 Application(s) Topical daily  gabapentin 200 milliGRAM(s) Oral two times a day  midodrine. 20 milliGRAM(s) Oral three times a day  pantoprazole    Tablet 40 milliGRAM(s) Oral before breakfast  senna 2 Tablet(s) Oral at bedtime  vancomycin  IVPB 1250 milliGRAM(s) IV Intermittent every 24 hours    MEDICATIONS  (PRN):  HYDROmorphone  Injectable 1 milliGRAM(s) IV Push every 3 hours PRN Severe Pain (7 - 10)  oxyCODONE    IR 10 milliGRAM(s) Oral every 4 hours PRN Moderate Pain (4 - 6)  polyethylene glycol 3350 17 Gram(s) Oral daily PRN Constipation      Allergies    No Known Allergies    Intolerances        Review of Systems:  General:  No wt loss, fevers, chills, night sweats, fatigue,   Eyes:  Good vision, no reported pain  ENT:  No sore throat, pain, runny nose, dysphagia  CV:  No pain, palpitations, hypo/hypertension  Resp:  No dyspnea, cough, tachypnea, wheezing  GI:  See HPI  :  No pain, bleeding, incontinence, nocturia  Muscle:  No pain, weakness  Neuro:  No weakness, tingling, memory problems  Psych:  No fatigue, insomnia, mood problems, depression  Endocrine:  No polyuria, polydipsia, cold/heat intolerance  Heme:  No petechiae, ecchymosis, easy bruisability  Integumentary:  No rash, edema    Vital Signs Last 24 Hrs  T(C): 36.4 (31 Aug 2022 11:48), Max: 36.7 (30 Aug 2022 20:33)  T(F): 97.5 (31 Aug 2022 11:48), Max: 98.1 (30 Aug 2022 20:33)  HR: 92 (31 Aug 2022 11:48) (91 - 97)  BP: 107/68 (31 Aug 2022 11:48) (100/67 - 124/77)  BP(mean): --  RR: 18 (31 Aug 2022 11:48) (18 - 18)  SpO2: 95% (31 Aug 2022 11:48) (95% - 97%)    Parameters below as of 31 Aug 2022 11:48  Patient On (Oxygen Delivery Method): nasal cannula        PHYSICAL EXAM:    GENERAL:  Appears stated age, well-groomed, well-nourished, no distress  HEENT:  NC/AT,  conjunctivae anicteric, clear and pink,   NECK: supple, trachea midline  CHEST:  Full & symmetric excursion, no increased effort, breath sounds clear  HEART:  Regular rhythm, no JVD  ABDOMEN:  Soft, non-tender, non-distended, normoactive bowel sounds,  no masses , no hepatosplenomegaly, +ascites   EXTREMITIES:  no cyanosis,clubbing or edema  SKIN:  No rash, erythema, or, ecchymoses, no jaundice  NEURO:  Alert, non-focal, no asterixis  PSYCH: Appropriate affect, oriented to place and time  RECTAL: Deferred    LABS:                        12.2   7.93  )-----------( 41       ( 31 Aug 2022 06:02 )             37.6     08-31    135  |  95<L>  |  63<H>  ----------------------------<  144<H>  3.7   |  30  |  1.02    Ca    8.8      31 Aug 2022 06:02    TPro  5.3<L>  /  Alb  3.1<L>  /  TBili  0.6  /  DBili  x   /  AST  46<H>  /  ALT  25  /  AlkPhos  406<H>  08-30          RADIOLOGY & ADDITIONAL TESTS:

## 2022-08-31 NOTE — PROGRESS NOTE ADULT - ASSESSMENT
73 y/o M with PMH of metastatic gallbladder CA (with mets to peritoneum, pleura, liver), hyponatremia, HLD. Presents to ED from [facility] for hypotension (reported SBP 70s), CHA, hyponatremia. Also admits to abdominal distention. Noted to have recent hospital admission at [facility] (3 weeks ago per patient, s/p L thoracentesis). CT C/A/P with moderate R and small L pl effusions, scattered pulm nodules with concern for lymphangitic spread, scattered liver & pancreatic lesions, peritoneal carcinomatosis with moderate to large volume ascites Also with incidental finding of L lower lobar and segmental PE. Pulmonary called to consult for pleural effusions, PE.

## 2022-09-01 NOTE — PROGRESS NOTE ADULT - ASSESSMENT
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 Tito Valentin of [facility] s/p Gemcitabine (dose reduced) /Durvalumab LD 8/19/2022 presenting with hypotension 70's/60's,  noted to be hyponatremic (Na 127) and CHA with volume depletion.    Of note patient was recently admitted to [facility] for SOB s/p left thoracentesis and was noted to be hyponatremic on that admission and was DC'd on salt tabs 1g BID, 1L fluid restriction and Lasix 40 mg for LE edema.    Stage IV Ca Gallbladder  --Under care by Dr. Tito Valentin of [facility]  --S/P Gemcitabine (reduced dose)/Durvalumab (LD 8/19/2022)  --He was offered hospice vs tx at [facility] in the past, pt wanted to give tx a try. At this time, he has multiple complications, would be a poor candidate for systemic tx if he does not improve. --Per discussion with Dr Valentin, hospice appropriate  --Palliative care on board for symptom management; planning family meeting for [facility]  --Per their most recent note, patient still wants to pursue disease modifying treatment at [facility]    Ascites  --S/P paracentesis 8/29 - 4.9 L removed  --Improved discomfort but still present  --On Rocephin ppx with concern for SBP - cultures NGTD  --Pt on Rocephin   --Planning for repeat paracentesis tomorrow 9/2 - Please arrange to have Pleurx placed during procedure    Pulmonary Embolus  --Cannot anticoagulate at this time given thrombocytopenia  --LE Doppler shows acute LLE DVT  --Patient will require IVC filter as platelets are too low for anticoagulation  --S/P IVC filter on 9/1    Hyponatremia  --Recently treated at [facility]  --Had been on Na tabs and fluid restriction  --Na 122 today   --Treatment per primary team, nephrology    CHA  --renal following, related to multiple issues, fluid imbalance  --Creatinine today 1.69    Hypotension  --Most likely secondary to volume depletion as well  --Improved with IVF   --on midodrine  --defer to renal, GI, specialists and primary team    Thrombocytopenia  -- Stable at 30K  --Most likely secondary to chemotherapy/liver disease  --monitor for now  --Please transfuse platelets if <10K or <50K with bleeding or prior to procedures  -- PTT low, not DIC    EF 44%  --Seen on recent TTE  --Cardiology called to evaluate    GOC  --Poor performance with progression of disease and increased tumor burden  --Dr. Saravia spoke with patient's wife and daughter about poor prognosis and they have agreed to DNR/DNI as well as hospice placement (home hospice preferred)  --Dr. Valentin notified and agrees that this is very reasonable.    We will continue to follow patient and coordinate with [facility]    José Arambula PA-C  Hematology/Oncology  [facility]   [phone] (office)  [phone] (alt office)  Evenings and weekends please call MD on call or office

## 2022-09-01 NOTE — CONSULT NOTE ADULT - CONSULT REQUESTED DATE/TIME
30-Aug-2022 12:10
26-Aug-2022 13:50
26-Aug-2022 15:01
26-Aug-2022 16:50
27-Aug-2022 10:00
31-Aug-2022 14:11
30-Aug-2022 17:52
01-Sep-2022 12:39
27-Aug-2022 12:20
27-Aug-2022 12:13

## 2022-09-01 NOTE — PROCEDURE NOTE - PLAN
Potentially retrievable filter. If patient no longer requires IVC filter or is able to tolerate systemic anticoagulation, the patient should follow-up with IR for removal.

## 2022-09-01 NOTE — PROGRESS NOTE ADULT - PROBLEM SELECTOR PLAN 1
Left lower lobar and segmental PE - incidentally found on CT C/A/P with IV contrast  -Likely provoked in the setting of malignancy   -Troponins, proBNP elevated  -Off full dose AC in the setting of thrombocytopenia. CT also with concern for L intramuscular hematoma  -LE duplex +DVT  -TTE with limited visualization of RV  -Vascular cardiology f/u  -Heme/onc f/u  -S/p IVC filter in IR 9/1.

## 2022-09-01 NOTE — PROGRESS NOTE ADULT - ASSESSMENT
72 year old man with metastatic Gb cancer and malignant ascites, admitted with hypotension, hyponatremia and CHA.     1) malignant ascites    - s/p LLQ Paracentesis performed. 4.9 liters of clear yellow fluid removed 8/29  - per floor ACP, family has decided for home hospice    2) metastatis GB cancer  - per oncology     3) PE, DVT  - unable to anticoagulate given thrombocytopenia  -s/p IVC filter today     4) CHA  - nephrology on board    5) Leukocytosis, improved   - per primary team     [facility]  Gastroenterology and Hepatology  [address] [place], [place]  Office: [phone]     72 year old man with metastatic Gb cancer and malignant ascites, admitted with hypotension, hyponatremia and CHA.     1) malignant ascites    - s/p LVP w/ 4.9 liters removed 8/29  - per floor ACP, family has decided for home hospice  - if p has recurrent ascites, would consider palliative peritoneal drain     2) metastatis GB cancer  - per oncology     3) PE, DVT  - unable to anticoagulate given thrombocytopenia  -s/p IVC filter today     4) CHA  - nephrology on board    5) Leukocytosis, improved   - per primary team     [facility]  Gastroenterology and Hepatology  [address] [place], [place]  Office: [phone]

## 2022-09-01 NOTE — CONSULT NOTE ADULT - CONSULT REQUESTED BY NAME
Rani
Dr. Saravia
[facility]
bebe
Dr. Melgar
ED
medicine
Primary Team
Dr. Edmondson

## 2022-09-01 NOTE — PROGRESS NOTE ADULT - ASSESSMENT
71yo M PMHx gallbladder cancer with metastasis to the peritoneum, right pleura, liver, hyponatremia who presents to [facility] from outside laboratory for hypotension, CHA and hyponatremia. Patient admits to abdominal distention, and was recently admitted for a thoracentesis. Also with abdominal wall cellulitis. Acute PE and DVT.    # Hyponatremia  # hypotension  # Volume overload evidenced by ascites/ effusion 2/2 GB ca w/ mets vs Systolic CHF    # CHA  Diet with fluid restriction to 1L   renal following  creat improving  cont midodrine borderline BP  sp paracentesis 8/29 4.9 L removed  cont albumin   leukocytosis downtrending  monitor erythema on abd  strict I&O   daily weights   echo noted with TTE- moderate systolic dysfunction with segmental wma   may eventually need to be on diuretics   cardiology consult     #Abdominal Wall Cellulitis w/ possible abscess   -abd ult noted with possible fluid collections    -no fever   -IR consult for possible aspiration   -c/w ceftriaxone and vancomcyin per ID     #acute pulmonary embolism & DVT  Unable to AC in view of thrombocytopenia & malignancy  TTE- moderate systolic dysfunction with segmental wma   IR consult for IVC filter  vascular cardiology fu    # Stage IV Ca Gallbladder diffuse mets  # Thrombocytopenia Likely due to Chemotherapy   # transaminitis  outpt following Dr. Tito Valentin of [facility]  sp Gemcitabine / Durvalumab (LD 8/19/2022), heme/onc following  appreciate palliative care discussion with family - would like to pursue all measures, full code  transfuse platelets if <10K or <50K with bleeding or prior to procedures --> Per IR, goal of Platelets is > 30K for paracentesis   statin on hold    Hold off on pharm DVT PPX for now    Spoke with wife Sukhjinder [phone] and daughter today.     DIspo: hospice consult, aim for home hospice.       PCP Dr. Patricia Goddard & Dr Davies    Please contact with any questions or concerns [phone].

## 2022-09-01 NOTE — PROGRESS NOTE ADULT - ASSESSMENT
71 y/o M with PMH of metastatic gallbladder CA (with mets to peritoneum, pleura, liver), hyponatremia, HLD. Presents to ED from [facility] for hypotension (reported SBP 70s), CHA, hyponatremia. Also admits to abdominal distention. Noted to have recent hospital admission at [facility] (3 weeks ago per patient, s/p L thoracentesis). CT C/A/P with moderate R and small L pl effusions, scattered pulm nodules with concern for lymphangitic spread, scattered liver & pancreatic lesions, peritoneal carcinomatosis with moderate to large volume ascites Also with incidental finding of L lower lobar and segmental PE. Pulmonary called to consult for pleural effusions, PE.

## 2022-09-01 NOTE — PROCEDURE NOTE - PROCEDURE FINDINGS AND DETAILS
LLQ Paracentesis performed. 4.9 liters of clear yellow fluid removed. Patient tolerated procedure well. Full report to follow.
R IJ vein access. Venogram without central thrombus. Successful infrarenal IVC filter placement.

## 2022-09-01 NOTE — CONSULT NOTE ADULT - SUBJECTIVE AND OBJECTIVE BOX
Interventional Radiology    Evaluate for Procedure: Paracentesis     HPI:  72 year old male with PMHx of gallbladder cancer with metastasis to the peritoneum, found to have bilateral above the knee DVT s/p IVC filter placement today. Patient with recurrent symptomatic ascites, IR consulted for repeat paracentesis. Patient now being transitioned to hospice per primary team. Last paracentesis on 8/29, 5L removed.     Allergies:     Medications (Abx/Cardiac/Anticoagulation/Blood Products)  cefTRIAXone   IVPB: 100 mL/Hr IV Intermittent (08-31 @ 11:39)  midodrine.: 20 milliGRAM(s) Oral (09-01 @ 06:15)  vancomycin  IVPB: 166.67 mL/Hr IV Intermittent (08-30 @ 14:17)  vancomycin  IVPB: 125 mL/Hr IV Intermittent (08-31 @ 14:32)    Data:  T(C): 36.8  HR: 105  BP: 110/73  RR: 18  SpO2: 98%    -WBC 7.04 / HgB 11.8 / Hct 37.6 / Plt 50  -Na 138 / Cl 96 / BUN 53 / Glucose 106  -K 4.2 / CO2 26 / Cr 0.95  -ALT -- / Alk Phos -- / T.Bili --  -INR 1.20 / PTT 31.1    Radiology: reviewed     Assessment/Plan:  72 year old male with PMHx of gallbladder cancer with metastasis to the peritoneum, found to have bilateral above the knee DVT s/p IVC filter placement today. Patient with recurrent symptomatic ascites, IR consulted for repeat paracentesis. Patient now being transitioned to hospice per primary team. Last paracentesis on 8/29, 5L removed.       -Will plan for paracentesis tomorrow 9/2.   -Please place IR procedure order under [facility] cornelius to feed pt from IR stand point  -STAT am labs  -Maintain COVID within 5 days  -Hold a/c x 24-48hrs  -Maintain active T&S  -Above d/w primary team  -Please contact IR at [phone] with any questions/ concerns regarding above.

## 2022-09-01 NOTE — PROGRESS NOTE ADULT - SUBJECTIVE AND OBJECTIVE BOX
[facility], Division of Infectious Diseases  DAHIANA Elaine Y. Patel, S. Shah, G. Casimir  [phone]  ([phone] - weekdays after 5pm and weekends)    Name: ZUHAIR NOONAN  Age/Gender: 72y Male  MRN: [id number]    Interval History:  Patient seen and examined this morning.   Feels thirsty, no other complaints.  Notes reviewed, plan for IVC filter placement today  No concerning overnight events  Afebrile   Allergies: No Known Allergies      Objective:  Vitals:   T(F): 97.6 (09-01-22 @ 04:59), Max: 97.6 (09-01-22 @ 04:59)  HR: 98 (09-01-22 @ 04:59) (92 - 98)  BP: 110/72 (09-01-22 @ 04:59) (107/68 - 110/72)  RR: 18 (09-01-22 @ 04:59) (18 - 18)  SpO2: 98% (09-01-22 @ 04:59) (95% - 98%)  Physical Examination:  General: no acute distress  HEENT: NC/AT, anicteric, hoarseness, neck supple  Respiratory: no acc muscle use, breathing comfortably  Cardiovascular: S1 and S2 present  Gastrointestinal: normal appearing, nondistended  Extremities: LE edema, no cyanosis  Skin: Prior abd incisions with induration, blanching erythema, no TTP or inc warmth  Lines: R chest wall port accessed with no erythema or TTP    Laboratory Studies:  CBC:                       11.8   7.04  )-----------( 50       ( 01 Sep 2022 06:52 )             37.6     WBC Trend:  7.04 09-01-22 @ 06:52  7.93 08-31-22 @ 06:02  10.27 08-30-22 @ 07:04  12.97 08-29-22 @ 07:43  13.19 08-28-22 @ 07:19  10.48 08-27-22 @ 07:20  9.30 08-26-22 @ 13:06    CMP: 09-01    138  |  96  |  53<H>  ----------------------------<  106<H>  4.2   |  26  |  0.95    Ca    9.3      01 Sep 2022 06:59      Creatinine, Serum: 0.95 mg/dL (09-01-22 @ 06:59)  Creatinine, Serum: 1.02 mg/dL (08-31-22 @ 06:02)  Creatinine, Serum: 1.69 mg/dL (08-30-22 @ 07:04)  Creatinine, Serum: 2.37 mg/dL (08-29-22 @ 07:46)  Creatinine, Serum: 2.33 mg/dL (08-28-22 @ 07:14)  Creatinine, Serum: 1.70 mg/dL (08-27-22 @ 07:09)  Creatinine, Serum: 1.19 mg/dL (08-26-22 @ 19:15)  Creatinine, Serum: 1.25 mg/dL (08-26-22 @ 13:06)    Microbiology: reviewed   Culture - Fungal, Body Fluid (collected 08-29-22 @ 14:50)  Source: Peritoneal Peritoneal Fluid  Preliminary Report (08-30-22 @ 07:37):    Testing in progress    Culture - Body Fluid with Gram Stain (collected 08-29-22 @ 14:50)  Source: Peritoneal Peritoneal Fluid  Gram Stain (08-29-22 @ 20:58):    polymorphonuclear leukocytes seen    No organisms seen    by cytocentrifuge  Preliminary Report (08-30-22 @ 15:41):    No growth    Culture - Blood (collected 08-27-22 @ 01:55)  Source: .Blood Blood-Peripheral  Final Report (09-01-22 @ 04:00):    No Growth Final    Culture - Blood (collected 08-27-22 @ 01:40)  Source: .Blood Blood-Peripheral  Final Report (09-01-22 @ 04:00):    No Growth Final    Culture - Urine (collected 08-26-22 @ 17:07)  Source: Clean Catch Clean Catch (Midstream)  Final Report (08-27-22 @ 23:41):    <10,000 CFU/mL Normal Urogenital Eliana    Radiology: reviewed     Medications:  cefTRIAXone   IVPB 2000 milliGRAM(s) IV Intermittent every 24 hours  chlorhexidine 2% Cloths 1 Application(s) Topical daily  gabapentin 200 milliGRAM(s) Oral two times a day  HYDROmorphone  Injectable 1 milliGRAM(s) IV Push every 3 hours PRN  midodrine. 20 milliGRAM(s) Oral three times a day  oxyCODONE    IR 10 milliGRAM(s) Oral every 4 hours PRN  pantoprazole    Tablet 40 milliGRAM(s) Oral before breakfast  polyethylene glycol 3350 17 Gram(s) Oral daily PRN  senna 2 Tablet(s) Oral at bedtime  vancomycin  IVPB 1250 milliGRAM(s) IV Intermittent every 24 hours    Antimicrobials:  cefTRIAXone   IVPB 2000 milliGRAM(s) IV Intermittent every 24 hours  vancomycin  IVPB 1250 milliGRAM(s) IV Intermittent every 24 hours

## 2022-09-01 NOTE — CONSULT NOTE ADULT - PROVIDER SPECIALTY LIST ADULT
Intervent Radiology
Intervent Radiology
Nephrology
Vascular Cardiology
Intervent Radiology
Heme/Onc
Cardiology
Gastroenterology
Palliative Care
Infectious Disease
Pulmonology

## 2022-09-01 NOTE — PROGRESS NOTE ADULT - SUBJECTIVE AND OBJECTIVE BOX
Patient is a 72y old  Male who presents with a chief complaint of Hyponatremia and CHA (01 Sep 2022 09:03)      SUBJECTIVE / OVERNIGHT EVENTS:  Off floor for IR guided IVC filter.       Vital Signs Last 24 Hrs  T(C): 36.7 (01 Sep 2022 11:10), Max: 36.8 (01 Sep 2022 09:46)  T(F): 98 (01 Sep 2022 11:10), Max: 98.3 (01 Sep 2022 09:46)  HR: 110 (01 Sep 2022 11:10) (83 - 110)  BP: 104/74 (01 Sep 2022 11:10) (104/74 - 110/72)  BP(mean): --  RR: 18 (01 Sep 2022 11:10) (18 - 18)  SpO2: 98% (01 Sep 2022 11:10) (95% - 98%)    Parameters below as of 01 Sep 2022 11:10  Patient On (Oxygen Delivery Method): room air      I&O's Summary    31 Aug 2022 07:01  -  01 Sep 2022 07:00  --------------------------------------------------------  IN: 240 mL / OUT: 1950 mL / NET: -1710 mL          LABS:                        11.8   7.04  )-----------( 50       ( 01 Sep 2022 06:52 )             37.6     09-01    138  |  96  |  53<H>  ----------------------------<  106<H>  4.2   |  26  |  0.95    Ca    9.3      01 Sep 2022 06:59      PT/INR - ( 01 Sep 2022 06:56 )   PT: 14.0 sec;   INR: 1.20 ratio         PTT - ( 01 Sep 2022 06:56 )  PTT:31.1 sec  CAPILLARY BLOOD GLUCOSE                RADIOLOGY & ADDITIONAL TESTS:    Imaging Personally Reviewed:  [x] YES  [ ] NO    Consultant(s) Notes Reviewed:  [x] YES  [ ] NO      MEDICATIONS  (STANDING):  cefTRIAXone   IVPB 2000 milliGRAM(s) IV Intermittent every 24 hours  chlorhexidine 2% Cloths 1 Application(s) Topical daily  gabapentin 200 milliGRAM(s) Oral two times a day  midodrine. 20 milliGRAM(s) Oral three times a day  pantoprazole    Tablet 40 milliGRAM(s) Oral before breakfast  senna 2 Tablet(s) Oral at bedtime  vancomycin  IVPB 1000 milliGRAM(s) IV Intermittent every 12 hours    MEDICATIONS  (PRN):  HYDROmorphone  Injectable 1 milliGRAM(s) IV Push every 3 hours PRN Severe Pain (7 - 10)  oxyCODONE    IR 10 milliGRAM(s) Oral every 4 hours PRN Moderate Pain (4 - 6)  polyethylene glycol 3350 17 Gram(s) Oral daily PRN Constipation      Care Discussed with Consultants/Other Providers [x] YES  [ ] NO    HEALTH ISSUES - PROBLEM Dx:  Hyponatremia    Suspected pulmonary embolism    Suspected deep vein thrombosis (DVT)    Pain due to neoplasm    Gallbladder cancer    Weakness    Palliative care encounter    Pleural effusion    Pulmonary embolism    Thrombocytopenia    Ascites    Shortness of breath    Constipation    CHA (acute kidney injury)    DVT, lower extremity

## 2022-09-01 NOTE — PROGRESS NOTE ADULT - SUBJECTIVE AND OBJECTIVE BOX
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 Found to have acute B/L femoral DVT and LLL PE. Patient is s/p paracentesis with 4.9L removed on 8/29. TTE noted with EF 44%, moderate segmental LV dysfunction, septal/anterior/lateral wall hypokinesis, RV not well visualized. Cardiology consulted for further evaluation.    Review of Systems:   Constitutional: [ ] fevers, [ ] chills.   Skin: [ ] dry skin. [ ] rashes.  Psychiatric: [ ] depression, [ ] anxiety.   Gastrointestinal: [ ] BRBPR, [ ] melena.   Neurological: [ ] confusion. [ ] seizures. [ ] shuffling gait.   Ears,Nose,Mouth and Throat: [ ] ear pain [ ] sore throat.   Eyes: [ ] diplopia.   Respiratory: [ ] hemoptysis. [ ] shortness of breath  Cardiovascular: See HPI above  Hematologic/Lymphatic: [ ] anemia. [ ] painful nodes. [ ] prolonged bleeding.   Genitourinary: [ ] hematuria. [ ] flank pain.   Endocrine: [ ] significant change in weight. [ ] intolerance to heat and cold.     Review of systems [ x] otherwise negative, [ ] otherwise unable to obtain    FH: no family history of sudden cardiac death in first degree relatives    SH: [ ] tobacco, [ ] alcohol, [ ] drugs    cefTRIAXone   IVPB 2000 milliGRAM(s) IV Intermittent every 24 hours  chlorhexidine 2% Cloths 1 Application(s) Topical daily  gabapentin 200 milliGRAM(s) Oral two times a day  HYDROmorphone  Injectable 1 milliGRAM(s) IV Push every 3 hours PRN  midodrine. 20 milliGRAM(s) Oral three times a day  oxyCODONE    IR 10 milliGRAM(s) Oral every 4 hours PRN  pantoprazole    Tablet 40 milliGRAM(s) Oral before breakfast  polyethylene glycol 3350 17 Gram(s) Oral daily PRN  senna 2 Tablet(s) Oral at bedtime  vancomycin  IVPB 1000 milliGRAM(s) IV Intermittent every 12 hours                            11.8   7.04  )-----------( 50       ( 01 Sep 2022 06:52 )             37.6       138  |  96  |  53<H>  ----------------------------<  106<H>  4.2   |  26  |  0.95    Ca    9.3      01 Sep 2022 06:59      T(C): 36.8 (09-01-22 @ 12:02), Max: 36.8 (09-01-22 @ 09:46)  HR: 105 (09-01-22 @ 12:02) (83 - 110)  BP: 110/73 (09-01-22 @ 12:02) (104/74 - 110/73)  RR: 18 (09-01-22 @ 12:02) (18 - 18)  SpO2: 98% (09-01-22 @ 12:02) (95% - 98%)  Wt(kg): --    I&O's Summary    31 Aug 2022 07:01  -  01 Sep 2022 07:00  --------------------------------------------------------  IN: 240 mL / OUT: 1950 mL / NET: -1710 mL    01 Sep 2022 07:01  -  01 Sep 2022 16:30  --------------------------------------------------------  IN: 0 mL / OUT: 450 mL / NET: -450 mL      Gen: Appears well in NAD  HEENT:  (-)icterus (-)pallor  CV: N S1 S2 1/6 HEVER (+)2 Pulses B/l  Resp:  Clear to auscultation B/L, normal effort  GI: (+) BS Soft, NT, ND  Lymph:  (-)Edema, (-)obvious lymphadenopathy  Skin: Warm to touch, Normal turgor  Psych: Appropriate mood and affect      TELEMETRY: SR 80-90	      ECG: Sinus Tachycardia at 105 bpm, no acute ST-T changes	    RADIOLOGY:         CXR: < from: Xray Chest 1 View AP/PA (08.26.22 @ 14:43) >  IMPRESSION:  Right chest wall MediPort tip in the region of the right atrium.  Bibasilar patchy airspace disease likelyatelectasis. Small left effusion.    < end of copied text >    < from: CT Chest w/ IV Cont (08.26.22 @ 14:43) >  IMPRESSION:    CHEST:  *  Pulmonary embolism within left lower lobar and segmental pulmonary   arteries.  *  Moderate right and small left pleural effusions.  *  Scattered nonspecific subcentimeter pulmonary nodules. Irregular   interlobular septal thickening in the right lung, possibly representing   lymphangitic spread of disease.  *  Enlarged heterogeneous thyroid gland with a mixed density leftthyroid   lobe nodule measuring 2.4 cm.    ABDOMEN AND PELVIS:  *  Scattered indeterminate liver lesions, presumably the patient's known   metastatic disease.  *  Findings consistent with known peritoneal carcinomatosis. Moderate to   large volume ascites.  *  Ill-defined lesion along the pancreatic tail, possibly reflecting   metastatic disease, with a primary pancreatic neoplasm not excluded.  *  Ovoid thickening of the left ventral abdominal musculature measuring   2.3 x 3.3 x 3.7 cm, potentially representing an implant, or possibly an   intramuscular hematoma.  *  Linear metallic density in the gastric fundus, possibly an endoclip.   Correlate with patient history.      Findings of pulmonary embolism were discussed with Dr. Argueta  8/26/2022   3:49 PM by Dr. Staley with read back confirmation.    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 Found to have acute B/L femoral DVT and LLL PE. Patient is s/p paracentesis with 4.9L removed on 8/29. TTE noted with EF 44%, moderate segmental LV dysfunction, septal/anterior/lateral wall hypokinesis, RV not well visualized. Cardiology consulted for further evaluation.    - Elevated troponin now downtrending likely demand ischemia in setting of hypotension and PE as well as CHA - no active chest pain or acute ischemic EKG changes - do not suspect ACS  - Vascular/IR eval noted regarding PE/DVT - no AC given thrombocytopenia, plan for IVC filter  - Not a candidate for ischemic eval given thrombocytopenia - unable to be on APT/AC  - Recommend medical management of moderate LV dysfunction with beta blockers and ACE/ARB however unable to start due to hypotension requiring midodrine  - Diuretics on hold per renal. CHA/Hyponatremia resolving.  - No further inpatient cardiac w/u expected at this time

## 2022-09-01 NOTE — PROGRESS NOTE ADULT - SUBJECTIVE AND OBJECTIVE BOX
Patient is a 72y old  Male who presents with a chief complaint of Hyponatremia and CHA (01 Sep 2022 12:55)    Patient seen this morning. S/P IVC filter placement.     MEDICATIONS  (STANDING):  cefTRIAXone   IVPB 2000 milliGRAM(s) IV Intermittent every 24 hours  chlorhexidine 2% Cloths 1 Application(s) Topical daily  gabapentin 200 milliGRAM(s) Oral two times a day  midodrine. 20 milliGRAM(s) Oral three times a day  pantoprazole    Tablet 40 milliGRAM(s) Oral before breakfast  senna 2 Tablet(s) Oral at bedtime  vancomycin  IVPB 1000 milliGRAM(s) IV Intermittent every 12 hours    MEDICATIONS  (PRN):  HYDROmorphone  Injectable 1 milliGRAM(s) IV Push every 3 hours PRN Severe Pain (7 - 10)  oxyCODONE    IR 10 milliGRAM(s) Oral every 4 hours PRN Moderate Pain (4 - 6)  polyethylene glycol 3350 17 Gram(s) Oral daily PRN Constipation    Vital Signs Last 24 Hrs  T(C): 36.8 (01 Sep 2022 12:02), Max: 36.8 (01 Sep 2022 09:46)  T(F): 98.2 (01 Sep 2022 12:02), Max: 98.3 (01 Sep 2022 09:46)  HR: 105 (01 Sep 2022 12:02) (83 - 110)  BP: 110/73 (01 Sep 2022 12:02) (104/74 - 110/73)  BP(mean): --  RR: 18 (01 Sep 2022 12:02) (18 - 18)  SpO2: 98% (01 Sep 2022 12:02) (95% - 98%)    Parameters below as of 01 Sep 2022 12:02  Patient On (Oxygen Delivery Method): room air        PE  NAD  Awake, lethargic (recovering from anesthesia)  Anicteric, MMM  RRR  CTAB  Abd distended, uncomfortable  No c/c/e  No rash grossly                            11.8   7.04  )-----------( 50       ( 01 Sep 2022 06:52 )             37.6       09-01    138  |  96  |  53<H>  ----------------------------<  106<H>  4.2   |  26  |  0.95    Ca    9.3      01 Sep 2022 06:59        ACC: [id number] EXAM:  CT ABDOMEN AND PELVIS                          *** ADDENDUM***    IMPRESSION:  Redemonstration of multiple sites of subcutaneous infiltration in the   abdominal WALL, which may be postprocedural.    Moderate to large volume ascites, not significantly changed.    Redemonstration of peritoneal carcinomatosis, liver lesions, and   pancreatic tail mass.    Moderate right and small pleural effusions, unchanged.    --- End of Report ---    *** END OF ADDENDUM***      PROCEDURE DATE:  08/31/2022          INTERPRETATION:  CLINICAL INFORMATION: Status post paracentesis, evaluate   fluid collection.    COMPARISON: Abdominal ultrasound 8/31/2022; CT chest, abdomen, and pelvis   8/26/2022.    CONTRAST/COMPLICATIONS:  IV Contrast: NONE  Oral Contrast: NONE  Complications: None reported at time of study completion    PROCEDURE:  CT of the Abdomen and Pelvis was performed.  Sagittal and coronal reformats were performed.    FINDINGS:  LOWER CHEST: Moderate right and small left pleural effusions with   adjacent atelectasis, unchanged.    LIVER: Redemonstrated bilobar hypodense liver lesions and coarse   calcification at the hepatic dome.  BILE DUCTS: Normal caliber.  GALLBLADDER: Intraluminal hyperdensity may represent sludge versus   vicarious excretion of contrast.  SPLEEN: Within normal limits.  PANCREAS: Pancreatic tail lesion better characterized on recent prior   contrast enhanced CT.  ADRENALS: Within normal limits.  KIDNEYS/URETERS: No hydronephrosis. Tiny nonobstructing bilateral renal   calculi.    BLADDER: Odell catheter.  REPRODUCTIVE ORGANS: Prostate within normal limits.    BOWEL: Redemonstration of a linear metallic density in the gastric   fundus. No bowel obstruction. Colonic diverticulosis without evidence of   diverticulitis.  PERITONEUM: Moderate to large volume abdominopelvic ascites, not   significant changed. Redemonstration of peritoneal carcinomatosis.  VESSELS: Atherosclerotic changes.  RETROPERITONEUM/LYMPH NODES: Small caliber retroperitoneal lymph nodes.  ABDOMINAL WALL: Redemonstrated 3.1 cm nodule in the left rectus   musculature. Redemonstrated multiple sites of subcutaneous infiltration   in the ventral abdominal wall. Small fat-containing left inguinal hernia.  BONES: Degenerative changes. Left hip arthroplasty.    IMPRESSION:  Redemonstration of multiple sites of subcutaneous infiltration in the   abdominal, which may be postprocedural.    Moderate to large volume ascites, not significantly changed.    Redemonstration of peritoneal carcinomatosis, liver lesions, and   pancreatic tail mass.    Moderate right and small pleural effusions, unchanged.    --- End of Report ---

## 2022-09-01 NOTE — CONSULT NOTE ADULT - CONSULT REASON
r/o SSTI
Stage IV Ca Gallbladder
Abnormal Echo
Hyponatremia
PE
Paracentesis
IVC filter placement
ascites
Complex symptom management in the setting of advanced illness
Pleural effusions, PE, lung nodules

## 2022-09-01 NOTE — PROGRESS NOTE ADULT - ASSESSMENT
Patient is a 72 year old male with PMHx of gallbladder cancer with metastasis to the peritoneum, right pleura, liver, hyponatremia, HLD who presents to [facility] from outside laboratory for hypotension, CHA and hyponatremia. Patient admits to abdominal distention, and was recently admitted for a thoracentesis at OSH. ID consulted for concern for abdominal cellulitis.     Abdominal cellulitis, possible abscess   - 3 indurated areas on abdomen -- chronic since surgery/drains per pt but now with erythema, slight TTP   - afebrile, WBC trended noted - normalized    - abdominal U/S with 4cm and 10 cm fluid collections in R and L abd sites that track to skin -- amenable to aspiration; 3rd is a 2cm septated collection at umbilicus area does not appear amenable to aspiration   - IR evaluation appreciated, unable to aspiration collections     - continue to monitor clinically, erythema slightly less/stable with no pain -- if any worsening would reconsult   - continue on ceftriaxone (started 8/27--)   - continue vancomycin -- will d/w pharmacist to adjust dose given improving renal function   -- monitor trough, goal 10-15   - monitor temps, WBC    Ascites, s/p paracentesis 8/29, rule out SBP   Stage IV gall bladder cancer with metastasis to peritoneum, right pleura, liver  Thrombocytopenia likely due to chemotherapy  Transaminitis in setting of above   - 4.9L fluid removed 8/29 -- fluid analysis reviewed, <250 neutrophils   - ascitic fluid culture gram stain with no orgnaisms, culture NGTD    - continue on ceftriaxone for SBP ppx    Acute PE and DVT    - unable to AC d/t thrombocytopenia and malignancy   - vascular and cardiology following   - IR planning for IVC filter today 9/1/22    CHA   - Nephrology following, monitor Cr   - renally dose meds/abx      Alex Medel M.D.  [facility], Division of Infectious Diseases  [phone]  After 5pm on weekdays and all day on weekends - please call [phone]

## 2022-09-01 NOTE — PROGRESS NOTE ADULT - CONVERSATION DETAILS
Spoke with wife and daughter in detail of patient's grim overall prognosis. Opted for home hospice. Want him as comfortable as possible when he arrives home. Requested DME, hospital bed and meds at home (naroctics, anxiolytics) to assisst with end of life care at home. Both daughter and wife understand the inevitable. Spoke with ACP . Hospice consult placed.
An extensive conversation was held.  I provided a contemporary and accurate explanation of palliative medicine.  We discussed the role of palliative medicine in enhancing the quality of life for patients living with serious medical illness, the role of the interdisciplinary team in addressing symptoms, coping, and healthcare navigation, and the impact of palliative care along the illness trajectory.  We discussed globals aspects of the patient's care with a focus on review goals and values.  I discussed  different approaches to care: curative, restorative, or purely symptom focused. I discussed the principles of hospice care including but not limited to overarching principles of treatment, candidacy, venues where hospice care is carried out, expectations surrounding rehospitalization, aide availability (time), and symptom burden focus.  After our discussion the patient and family decided that hospice is not their preferred discharge plan.  They would like a home care referral; I d/w CM    ACP 16 min

## 2022-09-01 NOTE — PROGRESS NOTE ADULT - SUBJECTIVE AND OBJECTIVE BOX
Chief Complaint:  Patient is a 72y old  Male who presents with a chief complaint of Hyponatremia and CHA (01 Sep 2022 12:39)      Date of service 22 @ 12:56      Interval Events:   Patient seen and examined.   s/p IVC filter today    Hospital Medications:  cefTRIAXone   IVPB 2000 milliGRAM(s) IV Intermittent every 24 hours  chlorhexidine 2% Cloths 1 Application(s) Topical daily  gabapentin 200 milliGRAM(s) Oral two times a day  HYDROmorphone  Injectable 1 milliGRAM(s) IV Push every 3 hours PRN  midodrine. 20 milliGRAM(s) Oral three times a day  oxyCODONE    IR 10 milliGRAM(s) Oral every 4 hours PRN  pantoprazole    Tablet 40 milliGRAM(s) Oral before breakfast  polyethylene glycol 3350 17 Gram(s) Oral daily PRN  senna 2 Tablet(s) Oral at bedtime  vancomycin  IVPB 1000 milliGRAM(s) IV Intermittent every 12 hours        Review of Systems:  General:  No wt loss, fevers, chills, night sweats, fatigue,   Eyes:  Good vision, no reported pain  ENT:  No sore throat, pain, runny nose, dysphagia  CV:  No pain, palpitations, hypo/hypertension  Resp:  No dyspnea, cough, tachypnea, wheezing  GI:  See HPI  :  No pain, bleeding, incontinence, nocturia  Muscle:  No pain, weakness  Neuro:  No weakness, tingling, memory problems  Psych:  No fatigue, insomnia, mood problems, depression  Endocrine:  No polyuria, polydipsia, cold/heat intolerance  Heme:  No petechiae, ecchymosis, easy bruisability  Integumentary:  No rash, edema    PHYSICAL EXAM:   Vital Signs:  Vital Signs Last 24 Hrs  T(C): 36.8 (01 Sep 2022 12:02), Max: 36.8 (01 Sep 2022 09:46)  T(F): 98.2 (01 Sep 2022 12:02), Max: 98.3 (01 Sep 2022 09:46)  HR: 105 (01 Sep 2022 12:02) (83 - 110)  BP: 110/73 (01 Sep 2022 12:02) (104/74 - 110/73)  BP(mean): --  RR: 18 (01 Sep 2022 12:02) (18 - 18)  SpO2: 98% (01 Sep 2022 12:02) (95% - 98%)    Parameters below as of 01 Sep 2022 12:02  Patient On (Oxygen Delivery Method): room air      Daily     Daily Weight in k.2 (01 Sep 2022 04:59)      PHYSICAL EXAM:     GENERAL:  Appears stated age, well-groomed, well-nourished, no distress  HEENT:  NC/AT,  conjunctivae anicteric, clear and pink,   NECK: supple, trachea midline  CHEST:  Full & symmetric excursion, no increased effort, breath sounds clear  HEART:  Regular rhythm, no JVD  ABDOMEN:  Soft, non-tender, non-distended, normoactive bowel sounds,  no masses , no hepatosplenomegaly  EXTREMITIES:  no cyanosis,clubbing or edema  SKIN:  No rash, erythema, or, ecchymoses, no jaundice  NEURO:  Alert, non-focal, no asterixis  PSYCH: Appropriate affect, oriented to place and time  RECTAL: Deferred      LABS Personally reviewed by me:                        11.8   7.04  )-----------( 50       ( 01 Sep 2022 06:52 )             37.6     Mean Cell Volume: 98.9 fl (22 @ 06:52)        138  |  96  |  53<H>  ----------------------------<  106<H>  4.2   |  26  |  0.95    Ca    9.3      01 Sep 2022 06:59        PT/INR - ( 01 Sep 2022 06:56 )   PT: 14.0 sec;   INR: 1.20 ratio         PTT - ( 01 Sep 2022 06:56 )  PTT:31.1 sec                            11.8   7.04  )-----------( 50       ( 01 Sep 2022 06:52 )             37.6                         12.2   7.93  )-----------( 41       ( 31 Aug 2022 06:02 )             37.6                         12.6   10.27 )-----------( 30       ( 30 Aug 2022 07:04 )             39.3       Imaging personally reviewed by me:

## 2022-09-01 NOTE — CONSULT NOTE ADULT - REASON FOR ADMISSION
Hyponatremia and CHA
Hypotension
Hyponatremia and CHA

## 2022-09-01 NOTE — PROGRESS NOTE ADULT - PROBLEM SELECTOR PLAN 5
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 [facility] [place]  -CT C/A/P with scattered lung nodules with concern for lymphangitic spread, scattered liver & pancreatic lesions, peritoneal carcinomatosis   -Heme/onc f/u.  -Prognosis guarded, palliative care f/u. 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 [facility] [place]  -CT C/A/P with scattered lung nodules with concern for lymphangitic spread, scattered liver & pancreatic lesions, peritoneal carcinomatosis   -Heme/onc f/u.  -Prognosis guarded, palliative care f/u. Plans for home hospice.

## 2022-09-01 NOTE — PROGRESS NOTE ADULT - SUBJECTIVE AND OBJECTIVE BOX
Follow-up Pulm Progress Note        Medications:  MEDICATIONS  (STANDING):  cefTRIAXone   IVPB 2000 milliGRAM(s) IV Intermittent every 24 hours  chlorhexidine 2% Cloths 1 Application(s) Topical daily  gabapentin 200 milliGRAM(s) Oral two times a day  midodrine. 20 milliGRAM(s) Oral three times a day  pantoprazole    Tablet 40 milliGRAM(s) Oral before breakfast  senna 2 Tablet(s) Oral at bedtime  vancomycin  IVPB 1000 milliGRAM(s) IV Intermittent every 12 hours    MEDICATIONS  (PRN):  HYDROmorphone  Injectable 1 milliGRAM(s) IV Push every 3 hours PRN Severe Pain (7 - 10)  oxyCODONE    IR 10 milliGRAM(s) Oral every 4 hours PRN Moderate Pain (4 - 6)  polyethylene glycol 3350 17 Gram(s) Oral daily PRN Constipation    Vital Signs Last 24 Hrs  T(C): 36.7 (01 Sep 2022 11:10), Max: 36.8 (01 Sep 2022 09:46)  T(F): 98 (01 Sep 2022 11:10), Max: 98.3 (01 Sep 2022 09:46)  HR: 110 (01 Sep 2022 11:10) (83 - 110)  BP: 104/74 (01 Sep 2022 11:10) (104/74 - 110/72)  BP(mean): --  RR: 18 (01 Sep 2022 11:10) (18 - 18)  SpO2: 98% (01 Sep 2022 11:10) (95% - 98%)    Parameters below as of 01 Sep 2022 11:10  Patient On (Oxygen Delivery Method): room air    08-31 @ 07:01  -  09-01 @ 07:00  --------------------------------------------------------  IN: 240 mL / OUT: 1950 mL / NET: -1710 mL    LABS:                        11.8   7.04  )-----------( 50       ( 01 Sep 2022 06:52 )             37.6     09-01    138  |  96  |  53<H>  ----------------------------<  106<H>  4.2   |  26  |  0.95    Ca    9.3      01 Sep 2022 06:59    PT/INR - ( 01 Sep 2022 06:56 )   PT: 14.0 sec;   INR: 1.20 ratio    PTT - ( 01 Sep 2022 06:56 )  PTT:31.1 sec    Fluid characteristics  -- 08-29 @ 14:50  pH --    tprot 3.6    Cell count  Appearance Cloudy  Fluid type --  BF lymph 45  color Yellow  eosinophil --  PMN --  Mesothelial 1  Monocyte 11  Other body cells --    CULTURES:     Culture - Blood (collected 08-27-22 @ 01:55)  Source: .Blood Blood-Peripheral  Final Report (09-01-22 @ 04:00):    No Growth Final    Culture - Blood (collected 08-27-22 @ 01:40)  Source: .Blood Blood-Peripheral  Final Report (09-01-22 @ 04:00):    No Growth Final    Culture - Urine (collected 08-26-22 @ 17:07)  Source: Clean Catch Clean Catch (Midstream)  Final Report (08-27-22 @ 23:41):    <10,000 CFU/mL Normal Urogenital Eliana    Culture - Body Fluid with Gram Stain (collected 08-29-22 @ 14:50)  Source: Peritoneal Peritoneal Fluid  Gram Stain (08-29-22 @ 20:58):    polymorphonuclear leukocytes seen    No organisms seen    by cytocentrifuge  Preliminary Report (08-30-22 @ 15:41):    No growth    Culture - Fungal, Body Fluid (collected 08-29-22 @ 14:50)  Source: Peritoneal Peritoneal Fluid  Preliminary Report (08-30-22 @ 07:37):    Testing in progress    Physical Examination:  PULM: Decreased BS at bases  CVS: S1, S2 heard    RADIOLOGY REVIEWED    < from: CT Abdomen and Pelvis No Cont (08.31.22 @ 16:30) >    ACC: [id number] EXAM:  CT ABDOMEN AND PELVIS                          *** ADDENDUM***    IMPRESSION:  Redemonstration of multiple sites of subcutaneous infiltration in the   abdominal WALL, which may be postprocedural.    Moderate to large volume ascites, not significantly changed.    Redemonstration of peritoneal carcinomatosis, liver lesions, and   pancreatic tail mass.    Moderate right and small pleural effusions, unchanged.    --- End of Report ---    *** END OF ADDENDUM***      PROCEDURE DATE:  08/31/2022          INTERPRETATION:  CLINICAL INFORMATION: Status post paracentesis, evaluate   fluid collection.    COMPARISON: Abdominal ultrasound 8/31/2022; CT chest, abdomen, and pelvis   8/26/2022.    CONTRAST/COMPLICATIONS:  IV Contrast: NONE  Oral Contrast: NONE  Complications: None reported at time of study completion    PROCEDURE:  CT of the Abdomen and Pelvis was performed.  Sagittal and coronal reformats were performed.    FINDINGS:  LOWER CHEST: Moderate right and small left pleural effusions with   adjacent atelectasis, unchanged.    LIVER: Redemonstrated bilobar hypodense liver lesions and coarse   calcification at the hepatic dome.  BILE DUCTS: Normal caliber.  GALLBLADDER: Intraluminal hyperdensity may represent sludge versus   vicarious excretion of contrast.  SPLEEN: Within normal limits.  PANCREAS: Pancreatic tail lesion better characterized on recent prior   contrast enhanced CT.  ADRENALS: Within normal limits.  KIDNEYS/URETERS: No hydronephrosis. Tiny nonobstructing bilateral renal   calculi.    BLADDER: Odell catheter.  REPRODUCTIVE ORGANS: Prostate within normal limits.    BOWEL: Redemonstration of a linear metallic density in the gastric   fundus. No bowel obstruction. Colonic diverticulosis without evidence of   diverticulitis.  PERITONEUM: Moderate to large volume abdominopelvic ascites, not   significant changed. Redemonstration of peritoneal carcinomatosis.  VESSELS: Atherosclerotic changes.  RETROPERITONEUM/LYMPH NODES: Small caliber retroperitoneal lymph nodes.  ABDOMINAL WALL: Redemonstrated 3.1 cm nodule in the left rectus   musculature. Redemonstrated multiple sites of subcutaneous infiltration   in the ventral abdominal wall. Small fat-containing left inguinal hernia.  BONES: Degenerative changes. Left hip arthroplasty.    IMPRESSION:  Redemonstration of multiple sites of subcutaneous infiltration in the   abdominal, which may be postprocedural.    Moderate to large volume ascites, not significantly changed.    Redemonstration of peritoneal carcinomatosis, liver lesions, and   pancreatic tail mass.    Moderate right and small pleural effusions, unchanged.    --- End of Report ---    < end of copied text >    CT chest: < from: CT Chest w/ IV Cont (08.26.22 @ 14:43) >    FINDINGS:  Streak artifact related the patient's upper extremities limits portions   of the exam, most prominently within the abdomen.    CHEST:  LUNGS, PLEURA, AND LARGE AIRWAYS: Patent central airways. Moderate right   and small left pleural effusions with associated compressive atelectasis.   Scattered bilateral pulmonary nodules, with example in the left upper   lobe measuring 4 mm (series 3 image 124), and example in the right upper   lobe measuring 4 mm (series 3 image 155). Mild interlobular septal   thickening in the right lung, which appears somewhat irregular, possibly   representing lymphangitic spread of disease.  VESSELS: Right chest wall port with catheter tip in the right atrium.   Atherosclerotic changes. Filling defect within the left lower lobar and   segmental pulmonary arteries, concerning for pulmonary embolism (series   601 image 85).  HEART: Heart size is normal. Mitral annular calcification. No pericardial   effusion.  MEDIASTINUM AND DARYL: No lymphadenopathy.  CHEST WALL AND LOWER NECK: Enlarged heterogeneous thyroid gland with   mixed density left thyroid nodule measuring approximate 2.4 cm (series 2  image 2).    ABDOMEN AND PELVIS:    LIVER: Scattered indeterminate hypodense liver lesions, with a larger   example at the dome measuring 4.0 cm. Another example measuring   approximately 2.6 cm at the posterior dome has an associated coarse   calcification.  BILE DUCTS: Normal caliber.  GALLBLADDER: Hyperdensity within the gallbladder may represent sludge, or   possibly the patient's known malignancy.  SPLEEN: Within normal limits.  PANCREAS: Ill-defined hypoattenuating lesion along the pancreatic tail   measuring approximately 3.8 x 2.1 cm (series 2 image 75). Abnormal tissue   extends into the splenic hilum with likely encasement of splenic vessels   and abuts the adjacent stomach.  ADRENALS: Within normal limits.  KIDNEYS/URETERS: A fewpunctate nonobstructing calculi in the upper pole   of the right kidney. No hydronephrosis.    Portions of the pelvis are obscured by streak artifact from a left hip   prosthesis.    BLADDER: Partially obscured by streak artifact; grossly within normal   limits.  REPRODUCTIVE ORGANS: Partially obscured by streak artifact. The prostate   is normal in size.    BOWEL: Linear metallic density measuring approximately 2 cm in length at   the gastric fundus. Colonic diverticulosis. No bowel obstruction.  PERITONEUM: Moderate to large volume ascites. Infiltration of the   peritoneum with appearance of omental caking. Sites of irregular   peritoneal thickening with nodularity, with example in the left lower   quadrant (series 2 image 132).  VESSELS: Atherosclerotic changes.  RETROPERITONEUM/LYMPH NODES: No lymphadenopathy.  ABDOMINAL WALL: Small fat-containing left inguinal hernia. Ovoid   thickening of the left ventral abdominal wall musculature measuring 2.3 x   3.3 x 3.7 cm (series 2 image 127). Subcutaneous infiltration in the upper   ventral abdominal wall.  BONES: Left hip arthroplasty. Degenerative changes. Grade 1   anterolisthesis of L4 over L5. At least moderate central canal narrowing   at L4-L5. Calcified bodies extending inferiorly below the left   glenohumeral joint. Calcified body adjacent to the right glenohumeral   joint.    IMPRESSION:    CHEST:  *  Pulmonary embolism within left lower lobar and segmental pulmonary   arteries.  *  Moderate right and small left pleural effusions.  *  Scattered nonspecific subcentimeter pulmonary nodules. Irregular   interlobular septal thickening in the right lung, possibly representing   lymphangitic spread of disease.  *  Enlarged heterogeneous thyroid gland with a mixed density leftthyroid   lobe nodule measuring 2.4 cm.    ABDOMEN AND PELVIS:  *  Scattered indeterminate liver lesions, presumably the patient's known   metastatic disease.  *  Findings consistent with known peritoneal carcinomatosis. Moderate to   large volume ascites.  *  Ill-defined lesion along the pancreatic tail, possibly reflecting   metastatic disease, with a primary pancreatic neoplasm not excluded.  *  Ovoid thickening of the left ventral abdominal musculature measuring   2.3 x 3.3 x 3.7 cm, potentially representing an implant, or possibly an   intramuscular hematoma.  *  Linear metallic density in the gastric fundus, possibly an endoclip.   Correlate with patient history.      Findings of pulmonary embolism were discussed with Dr. Argueta  8/26/2022   3:49 PM by Dr. Staley with read back confirmation.    --- End of Report ---    < end of copied text >      < from: VA Duplex Lower Ext Vein Scan, Bilat (08.29.22 @ 16:34) >  FINDINGS:    RIGHT:    There is acute, occlusive DVT affecting the right common femoral, deep   femoral, femoral and popliteal veins, above the knee.    The right posterior tibial peroneal trunk, and the posterior tibial,   peroneal and gastrocnemius veins are also thrombosed, below the knee.    LEFT:    And there is acute, occlusive DVT affecting the left common femoral vein   and the left femoral vein in the proximal thigh.    The left femoral vein in the distal thigh and the popliteal vein are   patent and free of thrombus.    The left posterior tibial and peroneal veins are patent and free of   thrombus.      IMPRESSION:    There is acute, occlusive DVT affecting the right common femoral, deep   femoral, femoral and popliteal veins, above the knee.  The right posterior tibial peroneal trunk, and the posterior tibial,   peroneal and gastrocnemius veins are also thrombosed, below the knee.    There is acute, occlusive DVT affecting the left common femoral vein and   the left femoral vein in the proximal thigh.    < from: TTE with Doppler (w/Cont) (08.30.22 @ 12:22) >  Dimensions:    Normal Values:  LA:     3.7    2.0 - 4.0 cm  Ao:     4.0    2.0 - 3.8 cm  SEPTUM: 1.1    0.6 - 1.2 cm  PWT:    1.0    0.6 - 1.1 cm  LVIDd:  3.5    3.0 - 5.6 cm  LVIDs:  2.4    1.8 - 4.0 cm  Derived variables:  LVMI: 48 g/m2  RWT: 0.57  Fractional short: 31 %  EF (Modified Tovar Rule): 44 %Doppler Peak Velocity  (m/sec): AoV=0.9  ------------------------------------------------------------------------  Observations:  Mitral Valve: Mitral annular calcification, otherwise  normal mitral valve. No mitral regurgitation seen.  Aortic Valve/Aorta: Normal trileaflet aortic valve. Peak  transaortic valve gradient equals 3 mm Hg, mean transaortic  valve gradient equals 2 mm Hg, aortic valve velocity time  integral equals 15 cm. No aortic valve regurgitation seen.  Peak left ventricular outflow tract gradient equals 4 mm  Hg, mean gradient is equal to 2 mm Hg, LVOT velocity time  integral equals 15 cm.  Dilated Aortic Root: 4 cm.  Left Atrium: Left atrium not well visualized.  Left Ventricle: Endocardial visualization enhanced with  intravenous injection of Ultrasonic Enhancing Agent  (Lumason). Moderate segmental left ventricular systolic  dysfunction. There is septal, anterior, and lateral  hypokinesis. Normal left ventricular internal dimensions  and wall thicknesses. Unable to determine diastolic  function due to poor visualization of the left atrium.  Right Heart: Right atrium not well visualized. The right  ventricle is not well visualized. Normal tricuspid valve.  No tricuspid regurgitation. RVSP was not calculated due to  insufficient Doppler signal. Normal pulmonic valve.  Pericardium/Pleura: No pericardial effusionseen.  Hemodynamic: Estimated right atrial pressure is 8 mm Hg.  ------------------------------------------------------------------------  Conclusions:  1. Dilated Aortic Root: 4 cm.  2. Left atrium not well visualized.  3. Endocardial visualization enhanced with intravenous  injection of Ultrasonic Enhancing Agent (Lumason). Moderate  segmental left ventricular systolic dysfunction. There is  septal, anterior, and lateral hypokinesis.  4. Unable to determine diastolic function due to poor  visualization of the left atrium.  5. Right atrium not well visualized.  6. The right ventricle is not well visualized.  7. Normal tricuspid valve. No tricuspid regurgitation. RVSP  was not calculated due to insufficient Doppler signal.  *** No previous Echo exam.    < end of copied text >

## 2022-09-02 NOTE — DISCHARGE NOTE PROVIDER - PROVIDER TOKENS
PROVIDER:[TOKEN:[[id number]]] PROVIDER:[TOKEN:[[id number]],FOLLOWUP:[1 week]] PROVIDER:[TOKEN:[[id number]],FOLLOWUP:[1 week]],PROVIDER:[TOKEN:[[id number]],FOLLOWUP:[Routine]]

## 2022-09-02 NOTE — PROGRESS NOTE ADULT - PROBLEM SELECTOR PLAN 3
PPSV 50%  High risk of further decline given comorbidities.
Likely in the setting of PE, pleural effusions, abdominal ascites  -Keep O>I as tolerated  -Keep sats >90% with supplemental O2 PRN (currently 2LNC). Pt not noted to be hypoxic on admission, wean O2 as tolerated  -VBG with no Co2 retention.
CT chest with b/l pleural effusions R>L  -Pleural effusions may be in the setting of fluid overload vs malignancy  -S/p recent admission at [facility] 8/1-8/12 for SOB in the setting of L pleural effusion, s/p L thoracentesis  [place] [facility] [place] b/l pl effusions R>L, increased since July 2022  -Keep O>I as tolerated  -TTE with moderate LV systolic dysfunction.
PPSV 50%
CT chest with b/l pleural effusions R>L  -Pleural effusions may be in the setting of fluid overload vs malignancy  -S/p recent admission at [facility] 8/1-8/12 for SOB in the setting of L pleural effusion, s/p L thoracentesis  -[place] [facility] [place] with b/l pl effusions R>L, increased since July 2022  -TTE with moderate LV systolic dysfunction  -CT A/P (lower chest) with similar moderate R, small L pl effusion  -Keep O>I as tolerated. Diuresis had been held in the setting of hyponatremia, hypotension. Consider resuming as tolerated, home dose Lasix 40 mg PO qd.
CT chest with b/l pleural effusions R>L  -Pleural effusions may be in the setting of fluid overload vs malignancy  -S/p recent admission at [facility] 8/1-8/12 for SOB in the setting of L pleural effusion, s/p L thoracentesis  -[facility] also with b/l pl effusions R>L, increased since July 2022  -Keep O>I as tolerated  -F/u TTE.
CT chest with b/l pleural effusions R>L  -Pleural effusions may be in the setting of fluid overload vs malignancy  -S/p recent admission at [facility] 8/1-8/12 for SOB in the setting of L pleural effusion, s/p L thoracentesis  -[place] chest [place] [facility] [place] also with b/l pl effusions R>L, increased since July 2022  -TTE with moderate LV systolic dysfunction  -CT A/P (lower chest) with similar moderate R, small L pl effusion  -Keep O>I as tolerated. Diuresis had been held in the setting of hyponatremia, hypotension. Consider resuming as tolerated, home dose Lasix 40 mg PO qd.  -Keep sats >90% with O2 PRN (currently 2LNC). C/o nasal congestion, start saline nasal spray 4x day PRN.

## 2022-09-02 NOTE — PROGRESS NOTE ADULT - PROBLEM SELECTOR PLAN 6
CT A/P with peritoneal carcinomatosis with moderate to large volume ascites  -S/p LLQ paracentesis in IR 8/29, 4.9 L removed  -Empiric ABX per primary team. ID f/u.
CT A/P with peritoneal carcinomatosis with moderate to large volume ascites  -S/p LLQ paracentesis in IR 8/29, 4.9 L removed  -Empiric ABX per ID for SBP prophylaxis. Also on vanco for abdominal cellulitis.
CT A/P with peritoneal carcinomatosis with moderate to large volume ascites  -S/p LLQ paracentesis in IR 8/29, 4.9 L removed  -Empiric ABX per ID for SBP prophylaxis. Also on vanco for abdominal cellulitis.
Monitor Plt count  -Transfusions per primary team/heme/onc  -Heme/onc f/u.
CT A/P with peritoneal carcinomatosis with moderate to large volume ascites  -S/p LLQ paracentesis in IR 8/29, 4.9 L removed  -Empiric ABX per primary team.

## 2022-09-02 NOTE — PROCEDURE NOTE - ADDITIONAL PROCEDURE DETAILS
Some bleeding seen upon removal, pressure held and bleeding resolved. Sterile dressing applied. Hemodynamically stable, normotensive. Abdomen soft, nontender, nondistended. Some blood seen in tubing upon removal. No oozing seen from site. Sterile dressing applied. Hemodynamically stable, normotensive. Abdomen soft, nontender, nondistended. Areas of erythema seen throughout abdomen (present prior to procedure).

## 2022-09-02 NOTE — DISCHARGE NOTE PROVIDER - CARE PROVIDER_API CALL
Deneen Chiu)  Radiology General  [address] [place] [address]  Phone: [phone]  Fax: [phone]  Follow Up Time:    Deneen Chiu)  Radiology General  [address] [place] [address]  Phone: [phone]  Fax: [phone]  Follow Up Time: 1 week   Deneen Chiu)  Radiology General  [address] [place] [address]  Phone: [phone]  Fax: [phone]  Follow Up Time: 1 week    Tito Valentin  Hematology/Oncology  [address] [place] [address]  Phone: [phone]  Fax: [phone]  Follow Up Time: Routine

## 2022-09-02 NOTE — PROGRESS NOTE ADULT - PROBLEM SELECTOR PLAN 5
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 [facility] [place]/p [place]  -CT C/A/P with scattered lung nodules with concern for lymphangitic spread, scattered liver & pancreatic lesions, peritoneal carcinomatosis   -Heme/onc f/u.  -Prognosis guarded, palliative care f/u. Plans for home hospice.

## 2022-09-02 NOTE — PROGRESS NOTE ADULT - ASSESSMENT
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 Tito Valentin of [facility] s/p Gemcitabine (dose reduced) /Durvalumab LD 8/19/2022 presenting with hypotension 70's/60's,  noted to be hyponatremic (Na 127) and CHA with volume depletion.    Of note patient was recently admitted to [facility] for SOB s/p left thoracentesis and was noted to be hyponatremic on that admission and was DC'd on salt tabs 1g BID, 1L fluid restriction and Lasix 40 mg for LE edema.    Stage IV Ca Gallbladder  --Under care by Dr. Tito Valentin of [facility]  --S/P Gemcitabine (reduced dose)/Durvalumab (LD 8/19/2022)  --He was offered hospice vs tx at [facility] [place] [facility] in the past, pt wanted to give tx a try. At this time, he has multiple complications, would be a poor candidate for systemic tx if he does not improve. --Per discussion with Dr Valentin, hospice appropriate  --Patient and family have had GOC discussion - have agreed to home hospice. Equipment is being dekvered tomorrow (9/3) with hopeful discharge home on Sundary 9/4.      Ascites  --S/P paracentesis 8/29 - 4.9 L removed  --Improved discomfort but still present  --On Rocephin ppx with concern for SBP - cultures NGTD  --Pt on Rocephin   --S/P repeat paracentesis today 9/2 - 5.9 L removed    Pulmonary Embolus  --Cannot anticoagulate at this time given thrombocytopenia  --LE Doppler shows acute LLE DVT  --S/P IVC filter on 9/1    Hyponatremia  --Recently treated at [facility]  --Had been on Na tabs and fluid restriction  --Na normalized  --Treatment per primary team, nephrology    CHA  --renal following, related to multiple issues, fluid imbalance  --Creatinine today normalized    Hypotension  --Most likely secondary to volume depletion as well  --Improved with IVF   --on midodrine  --defer to renal, GI, specialists and primary team    Thrombocytopenia  -- Has improved since admission but still low  --Most likely secondary to chemotherapy/liver disease  --monitor for now  --Please transfuse platelets if <10K or <50K with bleeding or prior to procedures  -- PTT low, not DIC    EF 44%  --Seen on recent TTE  --Cardiology called to evaluate    GOC  --Poor performance with progression of disease and increased tumor burden  --Dr. Saravia spoke with patient's wife and daughter about poor prognosis and they have agreed to DNR/DNI as well as hospice placement (home hospice preferred)  --Dr. Valentin notified and agrees that this is very reasonable.  --Pending set up for home hospice - equipment being delivered    We will continue to follow patient and coordinate with [facility]    José Arambula PA-C  Hematology/Oncology  [facility]   [phone] (office)  [phone] (alt office)  Evenings and weekends please call MD on call or office

## 2022-09-02 NOTE — PROGRESS NOTE ADULT - SUBJECTIVE AND OBJECTIVE BOX
Patient is a 72y old  Male who presents with a chief complaint of Hyponatremia and CHA (02 Sep 2022 13:41)      SUBJECTIVE / OVERNIGHT EVENTS:  Patient seen and examined.   Comfortable, no complaints.   Family at bedside.       Vital Signs Last 24 Hrs  T(C): 36.7 (02 Sep 2022 12:29), Max: 37.2 (01 Sep 2022 20:44)  T(F): 98.1 (02 Sep 2022 12:29), Max: 99 (01 Sep 2022 20:44)  HR: 108 (02 Sep 2022 12:29) (98 - 109)  BP: 101/73 (02 Sep 2022 12:29) (95/62 - 120/76)  BP(mean): --  RR: 17 (02 Sep 2022 12:29) (17 - 20)  SpO2: 96% (02 Sep 2022 12:29) (95% - 96%)    Parameters below as of 02 Sep 2022 12:29  Patient On (Oxygen Delivery Method): nasal cannula  O2 Flow (L/min): 2    I&O's Summary    01 Sep 2022 07:01  -  02 Sep 2022 07:00  --------------------------------------------------------  IN: 360 mL / OUT: 1775 mL / NET: -1415 mL    02 Sep 2022 07:01  -  02 Sep 2022 13:48  --------------------------------------------------------  IN: 120 mL / OUT: 0 mL / NET: 120 mL        PE:  GENERAL: NAD, AAOx2  CHEST/LUNG: CTABL, No wheeze  HEART: Regular rate and rhythm; no murmur  ABDOMEN: Soft, 3 points of indurated lessions-- small erythema, no warmth, Bowel sounds present  : rodriguez  EXTREMITIES:  2+ Peripheral Pulses, +2 LE edema  NEURO: No focal deficits      LABS:                        13.1   8.74  )-----------( 67       ( 02 Sep 2022 05:26 )             42.3     09-02    140  |  99  |  47<H>  ----------------------------<  120<H>  4.0   |  30  |  0.79    Ca    8.9      02 Sep 2022 05:26  Phos  2.3     09-02  Mg     2.2     09-02    TPro  5.6<L>  /  Alb  3.5  /  TBili  1.0  /  DBili  x   /  AST  100<H>  /  ALT  46<H>  /  AlkPhos  343<H>  09-02    PT/INR - ( 01 Sep 2022 06:56 )   PT: 14.0 sec;   INR: 1.20 ratio         PTT - ( 01 Sep 2022 06:56 )  PTT:31.1 sec  CAPILLARY BLOOD GLUCOSE                RADIOLOGY & ADDITIONAL TESTS:    Imaging Personally Reviewed:  [x] YES  [ ] NO    Consultant(s) Notes Reviewed:  [x] YES  [ ] NO      MEDICATIONS  (STANDING):  albumin human 25% IVPB 50 milliLiter(s) IV Intermittent every 6 hours  chlorhexidine 2% Cloths 1 Application(s) Topical daily  gabapentin 200 milliGRAM(s) Oral two times a day  midodrine. 20 milliGRAM(s) Oral three times a day  pantoprazole    Tablet 40 milliGRAM(s) Oral before breakfast  polyethylene glycol 3350 17 Gram(s) Oral daily  senna 2 Tablet(s) Oral at bedtime  triamcinolone 0.1% Cream 1 Application(s) Topical every 12 hours  vancomycin  IVPB 1000 milliGRAM(s) IV Intermittent every 12 hours    MEDICATIONS  (PRN):  HYDROmorphone  Injectable 1 milliGRAM(s) IV Push every 3 hours PRN Severe Pain (7 - 10)  oxyCODONE    IR 10 milliGRAM(s) Oral every 4 hours PRN Moderate Pain (4 - 6)  sodium chloride 0.65% Nasal 1 Spray(s) Both Nostrils four times a day PRN Nasal Congestion      Care Discussed with Consultants/Other Providers [x] YES  [ ] NO    HEALTH ISSUES - PROBLEM Dx:  Hyponatremia    Suspected pulmonary embolism    Suspected deep vein thrombosis (DVT)    Pain due to neoplasm    Gallbladder cancer    Weakness    Palliative care encounter    Pleural effusion    Pulmonary embolism    Thrombocytopenia    Ascites    Shortness of breath    Constipation    CHA (acute kidney injury)    DVT, lower extremity

## 2022-09-02 NOTE — DISCHARGE NOTE PROVIDER - HOSPITAL COURSE
73yo M PMHx gallbladder cancer with metastasis to the peritoneum, right pleura, liver, hyponatremia who presents to [facility] from outside laboratory for hypotension, CHA and hyponatremia. Patient admits to abdominal distention, and was recently admitted for a thoracentesis. Also with abdominal wall cellulitis. Acute PE and DVT.    # Hyponatremia  # hypotension  # Volume overload evidenced by ascites/ effusion 2/2 GB ca w/ mets vs Systolic CHF    # CHA  Diet with fluid restriction to 1L   renal following  creat improving  cont midodrine borderline BP  sp paracentesis 8/29 4.9 L removed s/p repeat para 09/2--5.9  cont albumin PRNC  leukocytosis downtrending  monitor erythema on abd  strict I&O   daily weights   echo noted with TTE- moderate systolic dysfunction with segmental wma   cardiology consult appreciated --no acei- bb due to hypotension... diuretics?  Plan for home hospice     #Abdominal Wall Cellulitis w/ possible abscess   -abd ult noted with possible fluid collections    -no fever   -IR consult for possible aspiration   -c/w ceftriaxone and vancomcyin per ID     #acute pulmonary embolism & DVT  s/p IVC filter   Unable to AC in view of thrombocytopenia & malignancy  TTE- moderate systolic dysfunction with segmental wma   vascular cardiology fu    # Stage IV Ca Gallbladder diffuse mets  # Thrombocytopenia Likely due to Chemotherapy   # transaminitis  outpt following Dr. Tito Valentin of [facility] [place]  sp Gemcitabine / Durvalumab (LD 8/19/2022), heme/onc following  appreciate palliative care recs; plan is for home hospice   transfuse platelets if <10K or <50K with bleeding or prior to procedures --> Per IR, goal of Platelets is > 30K for paracentesis   statin on hold    Hold off on pharm DVT PPX for now

## 2022-09-02 NOTE — PROGRESS NOTE ADULT - SUBJECTIVE AND OBJECTIVE BOX
[facility], Division of Infectious Diseases  DAHIANA Elaine Y. Patel, S. Shah, G. Casimir  [phone]  ([phone] - weekdays after 5pm and weekends)    Name: ZUHAIR NOONAN  Age/Gender: 72y Male  MRN: [id number]    Interval History:  Patient seen and examined this morning.   Feels ok, denies fever, pain or any new complaints.   Notes reviewed. No concerning overnight events  Afebrile   Allergies: No Known Allergies    Objective:  Vitals:   T(F): 98 (09-02-22 @ 04:55), Max: 99 (09-01-22 @ 20:44)  HR: 105 (09-02-22 @ 04:55) (83 - 110)  BP: 114/80 (09-02-22 @ 04:55) (95/62 - 118/76)  RR: 20 (09-02-22 @ 04:55) (18 - 20)  SpO2: 96% (09-02-22 @ 04:55) (95% - 98%)  Physical Examination:  General: no acute distress  HEENT: NC/AT, anicteric, neck supple  Respiratory: no acc muscle use, breathing comfortably  Cardiovascular: S1 and S2 present  Gastrointestinal: soft, nontender, nondistended  Extremities: b/l LE edema with stasis dermatitis, no warmth or TTP, no cyanosis  Skin: Prior abd incisions with induration, blanching erythema, no TTP or inc warmth  Lines: R chest wall port accessed with no erythema    Laboratory Studies:  CBC:                       13.1   8.74  )-----------( 67       ( 02 Sep 2022 05:26 )             42.3     WBC Trend:  8.74 09-02-22 @ 05:26  7.04 09-01-22 @ 06:52  7.93 08-31-22 @ 06:02  10.27 08-30-22 @ 07:04  12.97 08-29-22 @ 07:43  13.19 08-28-22 @ 07:19  10.48 08-27-22 @ 07:20  9.30 08-26-22 @ 13:06    CMP: 09-02    140  |  99  |  47<H>  ----------------------------<  120<H>  4.0   |  30  |  0.79    Ca    8.9      02 Sep 2022 05:26  Phos  2.3     09-02  Mg     2.2     09-02    TPro  5.6<L>  /  Alb  3.5  /  TBili  1.0  /  DBili  x   /  AST  100<H>  /  ALT  46<H>  /  AlkPhos  343<H>  09-02    Creatinine, Serum: 0.79 mg/dL (09-02-22 @ 05:26)  Creatinine, Serum: 0.95 mg/dL (09-01-22 @ 06:59)  Creatinine, Serum: 1.02 mg/dL (08-31-22 @ 06:02)  Creatinine, Serum: 1.69 mg/dL (08-30-22 @ 07:04)  Creatinine, Serum: 2.37 mg/dL (08-29-22 @ 07:46)  Creatinine, Serum: 2.33 mg/dL (08-28-22 @ 07:14)  Creatinine, Serum: 1.70 mg/dL (08-27-22 @ 07:09)  Creatinine, Serum: 1.19 mg/dL (08-26-22 @ 19:15)  Creatinine, Serum: 1.25 mg/dL (08-26-22 @ 13:06)    LIVER FUNCTIONS - ( 02 Sep 2022 05:26 )  Alb: 3.5 g/dL / Pro: 5.6 g/dL / ALK PHOS: 343 U/L / ALT: 46 U/L / AST: 100 U/L / GGT: x           Microbiology: reviewed   Culture - Fungal, Body Fluid (collected 08-29-22 @ 14:50)  Source: Peritoneal Peritoneal Fluid  Preliminary Report (08-30-22 @ 07:37):    Testing in progress    Culture - Body Fluid with Gram Stain (collected 08-29-22 @ 14:50)  Source: Peritoneal Peritoneal Fluid  Gram Stain (08-29-22 @ 20:58):    polymorphonuclear leukocytes seen    No organisms seen    by cytocentrifuge  Preliminary Report (08-30-22 @ 15:41):    No growth    Culture - Blood (collected 08-27-22 @ 01:55)  Source: .Blood Blood-Peripheral  Final Report (09-01-22 @ 04:00):    No Growth Final    Culture - Blood (collected 08-27-22 @ 01:40)  Source: .Blood Blood-Peripheral  Final Report (09-01-22 @ 04:00):    No Growth Final    Culture - Urine (collected 08-26-22 @ 17:07)  Source: Clean Catch Clean Catch (Midstream)  Final Report (08-27-22 @ 23:41):    <10,000 CFU/mL Normal Urogenital Eliana    Radiology: reviewed     Medications:  cefTRIAXone   IVPB 2000 milliGRAM(s) IV Intermittent every 24 hours  chlorhexidine 2% Cloths 1 Application(s) Topical daily  gabapentin 200 milliGRAM(s) Oral two times a day  HYDROmorphone  Injectable 1 milliGRAM(s) IV Push every 3 hours PRN  midodrine. 20 milliGRAM(s) Oral three times a day  oxyCODONE    IR 10 milliGRAM(s) Oral every 4 hours PRN  pantoprazole    Tablet 40 milliGRAM(s) Oral before breakfast  polyethylene glycol 3350 17 Gram(s) Oral daily PRN  senna 2 Tablet(s) Oral at bedtime  vancomycin  IVPB 1000 milliGRAM(s) IV Intermittent every 12 hours    Antimicrobials:  cefTRIAXone   IVPB 2000 milliGRAM(s) IV Intermittent every 24 hours  vancomycin  IVPB 1000 milliGRAM(s) IV Intermittent every 12 hours

## 2022-09-02 NOTE — PRE PROCEDURE NOTE - PRE PROCEDURE EVALUATION
Procedure: IVC Filter Placement    Indication: Bilateral DVT and PE with contraindication for anticoagulation      Clinical History: 72 year old male with PMHx of gallbladder cancer with metastasis to the peritoneum, found to have bilateral above the knee DVT and pulmonary embolism; team unable to anticoagulate. IR consulted for IVC filter placement.       Meds:cefTRIAXone   IVPB 2000 milliGRAM(s) IV Intermittent every 24 hours  chlorhexidine 2% Cloths 1 Application(s) Topical daily  gabapentin 200 milliGRAM(s) Oral two times a day  HYDROmorphone  Injectable 1 milliGRAM(s) IV Push every 3 hours PRN  midodrine. 20 milliGRAM(s) Oral three times a day  oxyCODONE    IR 10 milliGRAM(s) Oral every 4 hours PRN  pantoprazole    Tablet 40 milliGRAM(s) Oral before breakfast  polyethylene glycol 3350 17 Gram(s) Oral daily PRN  senna 2 Tablet(s) Oral at bedtime  vancomycin  IVPB 1000 milliGRAM(s) IV Intermittent every 12 hours      Allergies: No Known Allergies        Labs:                           11.8   7.04  )-----------( 50       ( 01 Sep 2022 06:52 )             37.6     PT/INR - ( 01 Sep 2022 06:56 )   PT: 14.0 sec;   INR: 1.20 ratio         PTT - ( 01 Sep 2022 06:56 )  PTT:31.1 sec  09-01    138  |  96  |  53<H>  ----------------------------<  106<H>  4.2   |  26  |  0.95    Ca    9.3      01 Sep 2022 06:59          Physical Exam: NAD      Anesthesia: Local    
Interventional Radiology    HPI: 72 year old male with PMHx of gallbladder cancer with metastasis to the peritoneum, found to have bilateral above the knee DVT s/p IVC filter placement today. Patient with recurrent symptomatic ascites, IR consulted for repeat paracentesis. Patient now being transitioned to hospice per primary team. Last paracentesis on 8/29, 5L removed.     Allergies: NKDA  Medications (Abx/Cardiac/Anticoagulation/Blood Products)    cefTRIAXone   IVPB: 100 mL/Hr IV Intermittent (09-01 @ 14:29)  midodrine.: 20 milliGRAM(s) Oral (09-02 @ 06:09)  vancomycin  IVPB: 125 mL/Hr IV Intermittent (08-31 @ 14:32)  vancomycin  IVPB: 125 mL/Hr IV Intermittent (09-01 @ 23:11)    Data:    T(C): 37.2  HR: 109  BP: 120/76  RR: 17  SpO2: 95%    Hypotension    Yes    Handoff    MEWS Score    Gallbladder cancer    IVC filter placement    Hypotension    Hyponatremia    Suspected pulmonary embolism    Suspected deep vein thrombosis (DVT)    Pain due to neoplasm    Gallbladder cancer    Weakness    Palliative care encounter    Pleural effusion    Pulmonary embolism    Thrombocytopenia    Ascites    Shortness of breath    Constipation    CHA (acute kidney injury)    DVT, lower extremity    HYPOTENSIVE    [email or web address]        Exam  General: No acute distress  Chest: Non labored breathing    -WBC 8.74 / HgB 13.1 / Hct 42.3 / Plt 67  -Na 140 / Cl 99 / BUN 47 / Glucose 120  -K 4.0 / CO2 30 / Cr 0.79  -ALT 46 / Alk Phos 343 / T.Bili 1.0  -INR1.20    Imaging:     Plan: 72y Male presents for paracentesis.  -Risks/Benefits/alternatives explained with the patient and/or healthcare proxy and witnessed informed consent obtained.   
Interventional Radiology    HPI: 72y Male with 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 Tito Valentin of [facility] [place] s/p Gemcitabine (dose reduced) /Durvalumab LD on 8/19/2022. Patient is here today for Paracentesis.    Allergies:   Medications (Abx/Cardiac/Anticoagulation/Blood Products)    cefTRIAXone   IVPB: 100 mL/Hr IV Intermittent (08-28 @ 12:09)  midodrine.: 20 milliGRAM(s) Oral (08-29 @ 06:04)  urea Oral Powder: 30 Gram(s) Oral (08-28 @ 06:22)    Data:  182.9  110  T(C): 36.9  HR: 98  BP: 100/67  RR: 18  SpO2: 98%    Exam  General: No acute distress  Chest: Non labored breathing  Abdomen: Non-distended  Extremities: No swelling, warm    -WBC 12.97 / HgB 12.6 / Hct 38.6 / Plt 34  -Na 127 / Cl 86 /  / Glucose 88  -K 5.1 / CO2 27 / Cr 2.37  -ALT 35 / Alk Phos 501 / T.Bili 0.5  -INR1.31    Imaging:   < from: US Abdomen Limited (08.26.22 @ 15:58) >  PROCEDURE DATE:  08/26/2022      INTERPRETATION:  CLINICAL INDICATION: 72-year-old male with abdominal   distention and previous ascites,    TECHNIQUE: Ultrasonography of the abdomen was preformed to assess for   ascites.    COMPARISON: CT abdomen and pelvis 8/26/2022    FINDINGS:    Right upper quadrant: Mild amount of ascites, measuring 7 cm in depth..  Right lower quadrant: Mild to moderate amount of ascites measuring 3.5 cm   in depth..  Left upper quadrant: Large amount of ascites, measuring 10.0 cm in depth.  Left lower quadrant: Moderate amount of ascites, measuring 3.0 cm in   depth.    IMPRESSION:    Abdominal and pelvic ascites with largest pocket in the left upper   quadrant.      Plan: 72y Male presents for Paracentesis  -Risks/Benefits/alternatives explained with the patient and/or healthcare proxy and witnessed informed consent obtained.

## 2022-09-02 NOTE — DISCHARGE NOTE PROVIDER - NSDCCPCAREPLAN_GEN_ALL_CORE_FT
PRINCIPAL DISCHARGE DIAGNOSIS  Diagnosis: Hypotension  Assessment and Plan of Treatment: Started on Midodrine, improved. Prescription sent to your pharmacy, continue as prescribed unless otherwise indicated by hospice team.      SECONDARY DISCHARGE DIAGNOSES  Diagnosis: Hyponatremia  Assessment and Plan of Treatment: Resolved    Diagnosis: Pulmonary embolism  Assessment and Plan of Treatment: not a candidate for anticoagulation given thrombocytopenia in setting of cancer.    Diagnosis: Deep vein thrombosis (DVT) with pulmonary embolism present on admission  Assessment and Plan of Treatment: Not a candidate for anticoagulation, IVC filter placed by interventional radiology on 9/1    Diagnosis: Ascites  Assessment and Plan of Treatment: status post paracentesis on 8/29 and 9/2    Diagnosis: Metastasis from gallbladder cancer  Assessment and Plan of Treatment: Patient and family have decided to go forward with hospice/comfort care, DNR/DNI.

## 2022-09-02 NOTE — PROGRESS NOTE ADULT - ASSESSMENT
71 y/o M with PMH of metastatic gallbladder CA (with mets to peritoneum, pleura, liver), hyponatremia, HLD. Presents to ED from [facility] [place] for hypotension (reported SBP 70s), CHA, hyponatremia. Also admits to abdominal distention. Noted to have recent hospital admission at [facility] (3 weeks ago per patient, s/p L thoracentesis). CT C/A/P with moderate R and small L pl effusions, scattered pulm nodules with concern for lymphangitic spread, scattered liver & pancreatic lesions, peritoneal carcinomatosis with moderate to large volume ascites Also with incidental finding of L lower lobar and segmental PE. Pulmonary called to consult for pleural effusions, PE.

## 2022-09-02 NOTE — PROGRESS NOTE ADULT - ASSESSMENT
73yo M PMHx gallbladder cancer with metastasis to the peritoneum, right pleura, liver, hyponatremia who presents to [facility] from outside laboratory for hypotension, CHA and hyponatremia. Patient admits to abdominal distention, and was recently admitted for a thoracentesis. Also with abdominal wall cellulitis. Acute PE and DVT.    # Hyponatremia  # hypotension  # Volume overload evidenced by ascites/ effusion 2/2 GB ca w/ mets vs Systolic CHF    # CHA  Diet with fluid restriction to 1L   renal following  creat improving  cont midodrine borderline BP  sp paracentesis 8/29 4.9 L removed s/p repeat para 09/2--5.9  cont albumin.   leukocytosis downtrending  monitor erythema on abd  strict I&O   daily weights   echo noted with TTE- moderate systolic dysfunction with segmental wma   cardiology consult appreciated --no acei- bb due to hypotension... diuretics?    #Abdominal Wall Cellulitis w/ possible abscess   -abd ult noted with possible fluid collections    -no fever   -IR consult for possible aspiration   -c/w ceftriaxone and vancomcyin per ID     #acute pulmonary embolism & DVT  Unable to AC in view of thrombocytopenia & malignancy  TTE- moderate systolic dysfunction with segmental wma   IR consult for IVC filter  vascular cardiology fu    # Stage IV Ca Gallbladder diffuse mets  # Thrombocytopenia Likely due to Chemotherapy   # transaminitis  outpt following Dr. Tito Valentin of [facility] [place]  sp Gemcitabine / Durvalumab (LD 8/19/2022), heme/onc following  appreciate palliative care discussion with family - would like to pursue all measures, full code  transfuse platelets if <10K or <50K with bleeding or prior to procedures --> Per IR, goal of Platelets is > 30K for paracentesis   statin on hold    Hold off on pharm DVT PPX for now    Wife Sukhjinder [phone] and daughter.  Discussed care with family at bedside.       DIspo: hospice consult, aim for home hospice, awaiting arrival of DME. .       PCP Dr. Patricia Goddard & Dr Davies    Please contact with any questions or concerns [phone].

## 2022-09-02 NOTE — PROGRESS NOTE ADULT - SUBJECTIVE AND OBJECTIVE BOX
Interventional Radiology Follow-Up Note.     Patient seen and examined @ bedside around 7 AM.     This is a 72y Male s/p IVC filter placement on 9/1 in Interventional Radiology with Dr. Chiu.     Medication:     cefTRIAXone   IVPB: (09-01)  midodrine.: (09-02)  vancomycin  IVPB: (08-31)  vancomycin  IVPB: (09-01)    Vitals:   T(F): 98, Max: 99 (20:44)  HR: 105  BP: 114/80  RR: 20  SpO2: 96%    Physical Exam:  General: Nontoxic, in NAD.  Abdomen: soft, Distended. Dressing c/d/i.        LABS:                         13.1   8.74  )-----------( 67       ( 02 Sep 2022 05:26 )             42.3       09-02    140  |  99  |  47<H>  ----------------------------<  120<H>  4.0   |  30  |  0.79    Ca    8.9      02 Sep 2022 05:26  Phos  2.3     09-02  Mg     2.2     09-02    TPro  5.6<L>  /  Alb  3.5  /  TBili  1.0  /  DBili  x   /  AST  100<H>  /  ALT  46<H>  /  AlkPhos  343<H>  09-02                  PT/INR - ( 01 Sep 2022 06:56 )   PT: 14.0 sec;   INR: 1.20 ratio         PTT - ( 01 Sep 2022 06:56 )  PTT:31.1 sec     Assessment/Plan: 72 year old male with PMHx of gallbladder cancer with metastasis to the peritoneum, found to have bilateral above the knee DVT and pulmonary embolism ; team unable to anticoagulate 2/2 thrombocytopenia.    - Potentially retrievable filter. If patient no longer requires IVC filter or is able to tolerate systemic anticoagulation, the patient should follow-up with IR for removal.       Please call IR  [phone] with any questions, concerns, or issues regarding above.    Also available on Teams.

## 2022-09-02 NOTE — PROGRESS NOTE ADULT - PROBLEM SELECTOR PLAN 7
Monitor Plt count  -Transfusions per primary team/heme/onc  -Heme/onc f/u.
Monitor Plt count  -Transfusions per primary team/heme/onc  -Heme/onc f/u.
Per renal.
Monitor Plt count  -Transfusions per primary team/heme/onc  -Heme/onc f/u.
Monitor Plt count  -Transfusions per primary team/heme/onc  -Heme/onc f/u.

## 2022-09-02 NOTE — DISCHARGE NOTE PROVIDER - NSDCMRMEDTOKEN_GEN_ALL_CORE_FT
allopurinol 300 mg oral tablet: 1 tab(s) orally once a day  clotrimazole 10 mg oral lozenge: 1 lozenge orally 5 times a day, As Needed  furosemide 40 mg oral tablet: 1 tab(s) orally once a day  gabapentin 100 mg oral capsule: 2 cap(s) orally 2 times a day  lactulose 10 g/15 mL oral syrup: 30 milliliter(s) orally once a day, As Needed  meloxicam 15 mg oral tablet: 1 tab(s) orally once a day  nystatin 100,000 units/mL oral suspension: 10 milliliter(s) orally every 6 hours, As Needed  omeprazole 40 mg oral delayed release capsule: 1 cap(s) orally once a day  ondansetron 8 mg oral tablet, disintegratin tab(s) orally 3 times a day  rosuvastatin 20 mg oral tablet: 1 tab(s) orally once a day  Sodium Chloride 1 g oral tablet: 1 tab(s) orally once a day  traZODone 100 mg oral tablet: 1 tab(s) orally once a day (at bedtime)   doxycycline hyclate 100 mg oral tablet: 1 tab(s) orally 2 times a day   gabapentin 100 mg oral capsule: 2 cap(s) orally 2 times a day  midodrine 10 mg oral tablet: 2 tab(s) orally 3 times a day   doxycycline hyclate 100 mg oral tablet: 1 tab(s) orally 2 times a day   gabapentin 100 mg oral capsule: 2 cap(s) orally 2 times a day  midodrine 10 mg oral tablet: 2 tab(s) orally 3 times a day  morphine 15 mg oral tablet: 1 tab(s) orally every 4 hours, As Needed pain MDD:6 tabs

## 2022-09-02 NOTE — PROGRESS NOTE ADULT - SUBJECTIVE AND OBJECTIVE BOX
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 Found to have acute B/L femoral DVT and LLL PE. Patient is s/p paracentesis with 4.9L removed on 8/29. TTE noted with EF 44%, moderate segmental LV dysfunction, septal/anterior/lateral wall hypokinesis, RV not well visualized. Cardiology consulted for further evaluation.    Review of Systems:   Constitutional: [ ] fevers, [ ] chills.   Skin: [ ] dry skin. [ ] rashes.  Psychiatric: [ ] depression, [ ] anxiety.   Gastrointestinal: [ ] BRBPR, [ ] melena.   Neurological: [ ] confusion. [ ] seizures. [ ] shuffling gait.   Ears,Nose,Mouth and Throat: [ ] ear pain [ ] sore throat.   Eyes: [ ] diplopia.   Respiratory: [ ] hemoptysis. [ ] shortness of breath  Cardiovascular: See HPI above  Hematologic/Lymphatic: [ ] anemia. [ ] painful nodes. [ ] prolonged bleeding.   Genitourinary: [ ] hematuria. [ ] flank pain.   Endocrine: [ ] significant change in weight. [ ] intolerance to heat and cold.     Review of systems [ x] otherwise negative, [ ] otherwise unable to obtain    FH: no family history of sudden cardiac death in first degree relatives    SH: [ ] tobacco, [ ] alcohol, [ ] drugs    albumin human 25% IVPB 50 milliLiter(s) IV Intermittent every 6 hours  chlorhexidine 2% Cloths 1 Application(s) Topical daily  gabapentin 200 milliGRAM(s) Oral two times a day  HYDROmorphone  Injectable 1 milliGRAM(s) IV Push every 3 hours PRN  midodrine. 20 milliGRAM(s) Oral three times a day  oxyCODONE    IR 10 milliGRAM(s) Oral every 4 hours PRN  pantoprazole    Tablet 40 milliGRAM(s) Oral before breakfast  polyethylene glycol 3350 17 Gram(s) Oral daily  senna 2 Tablet(s) Oral at bedtime  sodium chloride 0.65% Nasal 1 Spray(s) Both Nostrils four times a day PRN  triamcinolone 0.1% Cream 1 Application(s) Topical every 12 hours  vancomycin  IVPB 1000 milliGRAM(s) IV Intermittent every 12 hours                            13.1   8.74  )-----------( 67       ( 02 Sep 2022 05:26 )             42.3       09-02    140  |  99  |  47<H>  ----------------------------<  120<H>  4.0   |  30  |  0.79    Ca    8.9      02 Sep 2022 05:26  Phos  2.3     09-02  Mg     2.2     09-02    TPro  5.6<L>  /  Alb  3.5  /  TBili  1.0  /  DBili  x   /  AST  100<H>  /  ALT  46<H>  /  AlkPhos  343<H>  09-02    T(C): 36.7 (09-02-22 @ 12:29), Max: 37.2 (09-01-22 @ 20:44)  HR: 108 (09-02-22 @ 12:29) (98 - 109)  BP: 101/73 (09-02-22 @ 12:29) (95/62 - 120/76)  RR: 17 (09-02-22 @ 12:29) (17 - 20)  SpO2: 96% (09-02-22 @ 12:29) (95% - 96%)  Wt(kg): --    I&O's Summary    01 Sep 2022 07:01  -  02 Sep 2022 07:00  --------------------------------------------------------  IN: 360 mL / OUT: 1775 mL / NET: -1415 mL    02 Sep 2022 07:01  -  02 Sep 2022 16:10  --------------------------------------------------------  IN: 120 mL / OUT: 0 mL / NET: 120 mL    Gen: Appears well in NAD  HEENT:  (-)icterus (-)pallor  CV: N S1 S2 1/6 HEVER (+)2 Pulses B/l  Resp:  Clear to auscultation B/L, normal effort  GI: (+) BS Soft, NT, ND  Lymph:  (-)Edema, (-)obvious lymphadenopathy  Skin: Warm to touch, Normal turgor  Psych: Appropriate mood and affect      TELEMETRY: SR 80-90	      ECG: Sinus Tachycardia at 105 bpm, no acute ST-T changes	    RADIOLOGY:         CXR: < from: Xray Chest 1 View AP/PA (08.26.22 @ 14:43) >  IMPRESSION:  Right chest wall MediPort tip in the region of the right atrium.  Bibasilar patchy airspace disease likelyatelectasis. Small left effusion.    < end of copied text >    < from: CT Chest w/ IV Cont (08.26.22 @ 14:43) >  IMPRESSION:    CHEST:  *  Pulmonary embolism within left lower lobar and segmental pulmonary   arteries.  *  Moderate right and small left pleural effusions.  *  Scattered nonspecific subcentimeter pulmonary nodules. Irregular   interlobular septal thickening in the right lung, possibly representing   lymphangitic spread of disease.  *  Enlarged heterogeneous thyroid gland with a mixed density leftthyroid   lobe nodule measuring 2.4 cm.    ABDOMEN AND PELVIS:  *  Scattered indeterminate liver lesions, presumably the patient's known   metastatic disease.  *  Findings consistent with known peritoneal carcinomatosis. Moderate to   large volume ascites.  *  Ill-defined lesion along the pancreatic tail, possibly reflecting   metastatic disease, with a primary pancreatic neoplasm not excluded.  *  Ovoid thickening of the left ventral abdominal musculature measuring   2.3 x 3.3 x 3.7 cm, potentially representing an implant, or possibly an   intramuscular hematoma.  *  Linear metallic density in the gastric fundus, possibly an endoclip.   Correlate with patient history.      Findings of pulmonary embolism were discussed with Dr. Argueta  8/26/2022   3:49 PM by Dr. Staley with read back confirmation.    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 Found to have acute B/L femoral DVT and LLL PE. Patient is s/p paracentesis with 4.9L removed on 8/29. TTE noted with EF 44%, moderate segmental LV dysfunction, septal/anterior/lateral wall hypokinesis, RV not well visualized. Cardiology consulted for further evaluation.    - Elevated troponin now downtrending likely demand ischemia in setting of hypotension and PE as well as CHA - no active chest pain or acute ischemic EKG changes - do not suspect ACS  - Vascular/IR eval noted regarding PE/DVT - no AC given thrombocytopenia, plan for IVC filter  - Not a candidate for ischemic eval given thrombocytopenia - unable to be on APT/AC  - Recommend medical management of moderate LV dysfunction with beta blockers and ACE/ARB however unable to start due to hypotension requiring midodrine  - Diuretics on hold per renal. CHA/Hyponatremia resolving.  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 Found to have acute B/L femoral DVT and LLL PE. Patient is s/p paracentesis with 4.9L removed on 8/29. TTE noted with EF 44%, moderate segmental LV dysfunction, septal/anterior/lateral wall hypokinesis, RV not well visualized. Cardiology consulted for further evaluation.    pt more lethargic today, other consultants notes reviewed.    Review of Systems:   Constitutional: [ ] fevers, [ ] chills.   Skin: [ ] dry skin. [ ] rashes.  Psychiatric: [ ] depression, [ ] anxiety.   Gastrointestinal: [ ] BRBPR, [ ] melena.   Neurological: [ ] confusion. [ ] seizures. [ ] shuffling gait.   Ears,Nose,Mouth and Throat: [ ] ear pain [ ] sore throat.   Eyes: [ ] diplopia.   Respiratory: [ ] hemoptysis. [ ] shortness of breath  Cardiovascular: See HPI above  Hematologic/Lymphatic: [ ] anemia. [ ] painful nodes. [ ] prolonged bleeding.   Genitourinary: [ ] hematuria. [ ] flank pain.   Endocrine: [ ] significant change in weight. [ ] intolerance to heat and cold.     Review of systems [ ] otherwise negative, [x ] otherwise unable to obtain    FH: no family history of sudden cardiac death in first degree relatives    SH: [ ] tobacco, [ ] alcohol, [ ] drugs    albumin human 25% IVPB 50 milliLiter(s) IV Intermittent every 6 hours  chlorhexidine 2% Cloths 1 Application(s) Topical daily  gabapentin 200 milliGRAM(s) Oral two times a day  HYDROmorphone  Injectable 1 milliGRAM(s) IV Push every 3 hours PRN  midodrine. 20 milliGRAM(s) Oral three times a day  oxyCODONE    IR 10 milliGRAM(s) Oral every 4 hours PRN  pantoprazole    Tablet 40 milliGRAM(s) Oral before breakfast  polyethylene glycol 3350 17 Gram(s) Oral daily  senna 2 Tablet(s) Oral at bedtime  sodium chloride 0.65% Nasal 1 Spray(s) Both Nostrils four times a day PRN  triamcinolone 0.1% Cream 1 Application(s) Topical every 12 hours  vancomycin  IVPB 1000 milliGRAM(s) IV Intermittent every 12 hours                            13.1   8.74  )-----------( 67       ( 02 Sep 2022 05:26 )             42.3       09-02    140  |  99  |  47<H>  ----------------------------<  120<H>  4.0   |  30  |  0.79    Ca    8.9      02 Sep 2022 05:26  Phos  2.3     09-02  Mg     2.2     09-02    TPro  5.6<L>  /  Alb  3.5  /  TBili  1.0  /  DBili  x   /  AST  100<H>  /  ALT  46<H>  /  AlkPhos  343<H>  09-02    T(C): 36.7 (09-02-22 @ 12:29), Max: 37.2 (09-01-22 @ 20:44)  HR: 108 (09-02-22 @ 12:29) (98 - 109)  BP: 101/73 (09-02-22 @ 12:29) (95/62 - 120/76)  RR: 17 (09-02-22 @ 12:29) (17 - 20)  SpO2: 96% (09-02-22 @ 12:29) (95% - 96%)  Wt(kg): --    I&O's Summary    01 Sep 2022 07:01  -  02 Sep 2022 07:00  --------------------------------------------------------  IN: 360 mL / OUT: 1775 mL / NET: -1415 mL    02 Sep 2022 07:01  -  02 Sep 2022 16:10  --------------------------------------------------------  IN: 120 mL / OUT: 0 mL / NET: 120 mL    Gen: lethargic but arousable  HEENT:  (-)icterus (-)pallor  CV: N S1 S2 1/6 HEVER (+)2 Pulses B/l  Resp:  Clear to auscultation B/L, normal effort  GI: (+) BS Soft, NT, ND  Lymph:  (-)Edema, (-)obvious lymphadenopathy  Skin: Warm to touch, Normal turgor  Psych: Appropriate mood and affect      TELEMETRY: SR 80-90	      ECG: Sinus Tachycardia at 105 bpm, no acute ST-T changes	    RADIOLOGY:         CXR: < from: Xray Chest 1 View AP/PA (08.26.22 @ 14:43) >  IMPRESSION:  Right chest wall MediPort tip in the region of the right atrium.  Bibasilar patchy airspace disease likelyatelectasis. Small left effusion.    < end of copied text >    < from: CT Chest w/ IV Cont (08.26.22 @ 14:43) >  IMPRESSION:    CHEST:  *  Pulmonary embolism within left lower lobar and segmental pulmonary   arteries.  *  Moderate right and small left pleural effusions.  *  Scattered nonspecific subcentimeter pulmonary nodules. Irregular   interlobular septal thickening in the right lung, possibly representing   lymphangitic spread of disease.  *  Enlarged heterogeneous thyroid gland with a mixed density leftthyroid   lobe nodule measuring 2.4 cm.    ABDOMEN AND PELVIS:  *  Scattered indeterminate liver lesions, presumably the patient's known   metastatic disease.  *  Findings consistent with known peritoneal carcinomatosis. Moderate to   large volume ascites.  *  Ill-defined lesion along the pancreatic tail, possibly reflecting   metastatic disease, with a primary pancreatic neoplasm not excluded.  *  Ovoid thickening of the left ventral abdominal musculature measuring   2.3 x 3.3 x 3.7 cm, potentially representing an implant, or possibly an   intramuscular hematoma.  *  Linear metallic density in the gastric fundus, possibly an endoclip.   Correlate with patient history.      Findings of pulmonary embolism were discussed with Dr. Argueta  8/26/2022   3:49 PM by Dr. Staley with read back confirmation.    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 Found to have acute B/L femoral DVT and LLL PE. Patient is s/p paracentesis with 4.9L removed on 8/29. TTE noted with EF 44%, moderate segmental LV dysfunction, septal/anterior/lateral wall hypokinesis, RV not well visualized. Cardiology consulted for further evaluation.    - Elevated troponin now downtrending likely demand ischemia in setting of hypotension and PE as well as CHA - no active chest pain or acute ischemic EKG changes - do not suspect ACS  - Not a candidate for ischemic eval given thrombocytopenia - unable to be on APT/AC  - Recommend medical management of moderate LV dysfunction with beta blockers and ACE/ARB however unable to start due to hypotension requiring midodrine  - Diuretics on hold per renal. CHA/Hyponatremia resolving.  - No further inpatient cardiac w/u expected at this time  -now DNR, planned for [facility]

## 2022-09-02 NOTE — PROGRESS NOTE ADULT - PROBLEM SELECTOR PROBLEM 3
Pleural effusion
Weakness
Shortness of breath
Pleural effusion
Weakness

## 2022-09-02 NOTE — PROGRESS NOTE ADULT - PROBLEM SELECTOR PROBLEM 5
Palliative care encounter
Palliative care encounter
Ascites
Gallbladder cancer

## 2022-09-02 NOTE — PROGRESS NOTE ADULT - SUBJECTIVE AND OBJECTIVE BOX
Follow-up Pulm Progress Note    s/p paracentesis in IR this AM - 5.9L removed   No new respiratory events overnight.  Denies SOB/CP.   c/o nasal congestion     Medications:  MEDICATIONS  (STANDING):  albumin human 25% IVPB 50 milliLiter(s) IV Intermittent every 6 hours  chlorhexidine 2% Cloths 1 Application(s) Topical daily  gabapentin 200 milliGRAM(s) Oral two times a day  midodrine. 20 milliGRAM(s) Oral three times a day  pantoprazole    Tablet 40 milliGRAM(s) Oral before breakfast  polyethylene glycol 3350 17 Gram(s) Oral daily  senna 2 Tablet(s) Oral at bedtime  vancomycin  IVPB 1000 milliGRAM(s) IV Intermittent every 12 hours    MEDICATIONS  (PRN):  HYDROmorphone  Injectable 1 milliGRAM(s) IV Push every 3 hours PRN Severe Pain (7 - 10)  oxyCODONE    IR 10 milliGRAM(s) Oral every 4 hours PRN Moderate Pain (4 - 6)          Vital Signs Last 24 Hrs  T(C): 36.7 (02 Sep 2022 12:29), Max: 37.2 (01 Sep 2022 20:44)  T(F): 98.1 (02 Sep 2022 12:29), Max: 99 (01 Sep 2022 20:44)  HR: 108 (02 Sep 2022 12:29) (98 - 109)  BP: 101/73 (02 Sep 2022 12:29) (95/62 - 120/76)  BP(mean): --  RR: 17 (02 Sep 2022 12:29) (17 - 20)  SpO2: 96% (02 Sep 2022 12:29) (95% - 96%)    Parameters below as of 02 Sep 2022 12:29  Patient On (Oxygen Delivery Method): nasal cannula  O2 Flow (L/min): 2            09-01 @ 07:01  -  09-02 @ 07:00  --------------------------------------------------------  IN: 360 mL / OUT: 1775 mL / NET: -1415 mL          LABS:                        13.1   8.74  )-----------( 67       ( 02 Sep 2022 05:26 )             42.3     09-02    140  |  99  |  47<H>  ----------------------------<  120<H>  4.0   |  30  |  0.79    Ca    8.9      02 Sep 2022 05:26  Phos  2.3     09-02  Mg     2.2     09-02    TPro  5.6<L>  /  Alb  3.5  /  TBili  1.0  /  DBili  x   /  AST  100<H>  /  ALT  46<H>  /  AlkPhos  343<H>  09-02          CAPILLARY BLOOD GLUCOSE        PT/INR - ( 01 Sep 2022 06:56 )   PT: 14.0 sec;   INR: 1.20 ratio         PTT - ( 01 Sep 2022 06:56 )  PTT:31.1 sec                Fluid characteristics  -- 08-29 @ 14:50  pH --    tprot 3.6    Cell count  Appearance Cloudy  Fluid type --  BF lymph 45  color Yellow  eosinophil --  PMN --  Mesothelial 1  Monocyte 11  Other body cells --      CULTURES:     Culture - Blood (collected 08-27-22 @ 01:55)  Source: .Blood Blood-Peripheral  Final Report (09-01-22 @ 04:00):    No Growth Final    Culture - Blood (collected 08-27-22 @ 01:40)  Source: .Blood Blood-Peripheral  Final Report (09-01-22 @ 04:00):    No Growth Final        Culture - Urine (collected 08-26-22 @ 17:07)  Source: Clean Catch Clean Catch (Midstream)  Final Report (08-27-22 @ 23:41):    <10,000 CFU/mL Normal Urogenital Eliana              Culture - Body Fluid with Gram Stain (collected 08-29-22 @ 14:50)  Source: Peritoneal Peritoneal Fluid  Gram Stain (08-29-22 @ 20:58):    polymorphonuclear leukocytes seen    No organisms seen    by cytocentrifuge  Preliminary Report (08-30-22 @ 15:41):    No growth            Culture - Fungal, Body Fluid (collected 08-29-22 @ 14:50)  Source: Peritoneal Peritoneal Fluid  Preliminary Report (08-30-22 @ 07:37):    Testing in progress              Physical Examination:  PULM: Decreased BS at bases  CVS: S1, S2 heard    RADIOLOGY REVIEWED    < from: CT Abdomen and Pelvis No Cont (08.31.22 @ 16:30) >    ACC: [id number] EXAM:  CT ABDOMEN AND PELVIS                          *** ADDENDUM***    IMPRESSION:  Redemonstration of multiple sites of subcutaneous infiltration in the   abdominal WALL, which may be postprocedural.    Moderate to large volume ascites, not significantly changed.    Redemonstration of peritoneal carcinomatosis, liver lesions, and   pancreatic tail mass.    Moderate right and small pleural effusions, unchanged.    --- End of Report ---    *** END OF ADDENDUM***      PROCEDURE DATE:  08/31/2022          INTERPRETATION:  CLINICAL INFORMATION: Status post paracentesis, evaluate   fluid collection.    COMPARISON: Abdominal ultrasound 8/31/2022; CT chest, abdomen, and pelvis   8/26/2022.    CONTRAST/COMPLICATIONS:  IV Contrast: NONE  Oral Contrast: NONE  Complications: None reported at time of study completion    PROCEDURE:  CT of the Abdomen and Pelvis was performed.  Sagittal and coronal reformats were performed.    FINDINGS:  LOWER CHEST: Moderate right and small left pleural effusions with   adjacent atelectasis, unchanged.    LIVER: Redemonstrated bilobar hypodense liver lesions and coarse   calcification at the hepatic dome.  BILE DUCTS: Normal caliber.  GALLBLADDER: Intraluminal hyperdensity may represent sludge versus   vicarious excretion of contrast.  SPLEEN: Within normal limits.  PANCREAS: Pancreatic tail lesion better characterized on recent prior   contrast enhanced CT.  ADRENALS: Within normal limits.  KIDNEYS/URETERS: No hydronephrosis. Tiny nonobstructing bilateral renal   calculi.    BLADDER: Odell catheter.  REPRODUCTIVE ORGANS: Prostate within normal limits.    BOWEL: Redemonstration of a linear metallic density in the gastric   fundus. No bowel obstruction. Colonic diverticulosis without evidence of   diverticulitis.  PERITONEUM: Moderate to large volume abdominopelvic ascites, not   significant changed. Redemonstration of peritoneal carcinomatosis.  VESSELS: Atherosclerotic changes.  RETROPERITONEUM/LYMPH NODES: Small caliber retroperitoneal lymph nodes.  ABDOMINAL WALL: Redemonstrated 3.1 cm nodule in the left rectus   musculature. Redemonstrated multiple sites of subcutaneous infiltration   in the ventral abdominal wall. Small fat-containing left inguinal hernia.  BONES: Degenerative changes. Left hip arthroplasty.    IMPRESSION:  Redemonstration of multiple sites of subcutaneous infiltration in the   abdominal, which may be postprocedural.    Moderate to large volume ascites, not significantly changed.    Redemonstration of peritoneal carcinomatosis, liver lesions, and   pancreatic tail mass.    Moderate right and small pleural effusions, unchanged.    --- End of Report ---    < end of copied text >    CT chest: < from: CT Chest w/ IV Cont (08.26.22 @ 14:43) >    FINDINGS:  Streak artifact related the patient's upper extremities limits portions   of the exam, most prominently within the abdomen.    CHEST:  LUNGS, PLEURA, AND LARGE AIRWAYS: Patent central airways. Moderate right   and small left pleural effusions with associated compressive atelectasis.   Scattered bilateral pulmonary nodules, with example in the left upper   lobe measuring 4 mm (series 3 image 124), and example in the right upper   lobe measuring 4 mm (series 3 image 155). Mild interlobular septal   thickening in the right lung, which appears somewhat irregular, possibly   representing lymphangitic spread of disease.  VESSELS: Right chest wall port with catheter tip in the right atrium.   Atherosclerotic changes. Filling defect within the left lower lobar and   segmental pulmonary arteries, concerning for pulmonary embolism (series   601 image 85).  HEART: Heart size is normal. Mitral annular calcification. No pericardial   effusion.  MEDIASTINUM AND DARYL: No lymphadenopathy.  CHEST WALL AND LOWER NECK: Enlarged heterogeneous thyroid gland with   mixed density left thyroid nodule measuring approximate 2.4 cm (series 2  image 2).    ABDOMEN AND PELVIS:    LIVER: Scattered indeterminate hypodense liver lesions, with a larger   example at the dome measuring 4.0 cm. Another example measuring   approximately 2.6 cm at the posterior dome has an associated coarse   calcification.  BILE DUCTS: Normal caliber.  GALLBLADDER: Hyperdensity within the gallbladder may represent sludge, or   possibly the patient's known malignancy.  SPLEEN: Within normal limits.  PANCREAS: Ill-defined hypoattenuating lesion along the pancreatic tail   measuring approximately 3.8 x 2.1 cm (series 2 image 75). Abnormal tissue   extends into the splenic hilum with likely encasement of splenic vessels   and abuts the adjacent stomach.  ADRENALS: Within normal limits.  KIDNEYS/URETERS: A fewpunctate nonobstructing calculi in the upper pole   of the right kidney. No hydronephrosis.    Portions of the pelvis are obscured by streak artifact from a left hip   prosthesis.    BLADDER: Partially obscured by streak artifact; grossly within normal   limits.  REPRODUCTIVE ORGANS: Partially obscured by streak artifact. The prostate   is normal in size.    BOWEL: Linear metallic density measuring approximately 2 cm in length at   the gastric fundus. Colonic diverticulosis. No bowel obstruction.  PERITONEUM: Moderate to large volume ascites. Infiltration of the   peritoneum with appearance of omental caking. Sites of irregular   peritoneal thickening with nodularity, with example in the left lower   quadrant (series 2 image 132).  VESSELS: Atherosclerotic changes.  RETROPERITONEUM/LYMPH NODES: No lymphadenopathy.  ABDOMINAL WALL: Small fat-containing left inguinal hernia. Ovoid   thickening of the left ventral abdominal wall musculature measuring 2.3 x   3.3 x 3.7 cm (series 2 image 127). Subcutaneous infiltration in the upper   ventral abdominal wall.  BONES: Left hip arthroplasty. Degenerative changes. Grade 1   anterolisthesis of L4 over L5. At least moderate central canal narrowing   at L4-L5. Calcified bodies extending inferiorly below the left   glenohumeral joint. Calcified body adjacent to the right glenohumeral   joint.    IMPRESSION:    CHEST:  *  Pulmonary embolism within left lower lobar and segmental pulmonary   arteries.  *  Moderate right and small left pleural effusions.  *  Scattered nonspecific subcentimeter pulmonary nodules. Irregular   interlobular septal thickening in the right lung, possibly representing   lymphangitic spread of disease.  *  Enlarged heterogeneous thyroid gland with a mixed density leftthyroid   lobe nodule measuring 2.4 cm.    ABDOMEN AND PELVIS:  *  Scattered indeterminate liver lesions, presumably the patient's known   metastatic disease.  *  Findings consistent with known peritoneal carcinomatosis. Moderate to   large volume ascites.  *  Ill-defined lesion along the pancreatic tail, possibly reflecting   metastatic disease, with a primary pancreatic neoplasm not excluded.  *  Ovoid thickening of the left ventral abdominal musculature measuring   2.3 x 3.3 x 3.7 cm, potentially representing an implant, or possibly an   intramuscular hematoma.  *  Linear metallic density in the gastric fundus, possibly an endoclip.   Correlate with patient history.      Findings of pulmonary embolism were discussed with Dr. Argueta  8/26/2022   3:49 PM by Dr. Staley with read back confirmation.    --- End of Report ---    < end of copied text >      < from: VA Duplex Lower Ext Vein Scan, Bilat (08.29.22 @ 16:34) >  FINDINGS:    RIGHT:    There is acute, occlusive DVT affecting the right common femoral, deep   femoral, femoral and popliteal veins, above the knee.    The right posterior tibial peroneal trunk, and the posterior tibial,   peroneal and gastrocnemius veins are also thrombosed, below the knee.    LEFT:    And there is acute, occlusive DVT affecting the left common femoral vein   and the left femoral vein in the proximal thigh.    The left femoral vein in the distal thigh and the popliteal vein are   patent and free of thrombus.    The left posterior tibial and peroneal veins are patent and free of   thrombus.      IMPRESSION:    There is acute, occlusive DVT affecting the right common femoral, deep   femoral, femoral and popliteal veins, above the knee.  The right posterior tibial peroneal trunk, and the posterior tibial,   peroneal and gastrocnemius veins are also thrombosed, below the knee.    There is acute, occlusive DVT affecting the left common femoral vein and   the left femoral vein in the proximal thigh.    < from: TTE with Doppler (w/Cont) (08.30.22 @ 12:22) >  Dimensions:    Normal Values:  LA:     3.7    2.0 - 4.0 cm  Ao:     4.0    2.0 - 3.8 cm  SEPTUM: 1.1    0.6 - 1.2 cm  PWT:    1.0    0.6 - 1.1 cm  LVIDd:  3.5    3.0 - 5.6 cm  LVIDs:  2.4    1.8 - 4.0 cm  Derived variables:  LVMI: 48 g/m2  RWT: 0.57  Fractional short: 31 %  EF (Modified Tovar Rule): 44 %Doppler Peak Velocity  (m/sec): AoV=0.9  ------------------------------------------------------------------------  Observations:  Mitral Valve: Mitral annular calcification, otherwise  normal mitral valve. No mitral regurgitation seen.  Aortic Valve/Aorta: Normal trileaflet aortic valve. Peak  transaortic valve gradient equals 3 mm Hg, mean transaortic  valve gradient equals 2 mm Hg, aortic valve velocity time  integral equals 15 cm. No aortic valve regurgitation seen.  Peak left ventricular outflow tract gradient equals 4 mm  Hg, mean gradient is equal to 2 mm Hg, LVOT velocity time  integral equals 15 cm.  Dilated Aortic Root: 4 cm.  Left Atrium: Left atrium not well visualized.  Left Ventricle: Endocardial visualization enhanced with  intravenous injection of Ultrasonic Enhancing Agent  (Lumason). Moderate segmental left ventricular systolic  dysfunction. There is septal, anterior, and lateral  hypokinesis. Normal left ventricular internal dimensions  and wall thicknesses. Unable to determine diastolic  function due to poor visualization of the left atrium.  Right Heart: Right atrium not well visualized. The right  ventricle is not well visualized. Normal tricuspid valve.  No tricuspid regurgitation. RVSP was not calculated due to  insufficient Doppler signal. Normal pulmonic valve.  Pericardium/Pleura: No pericardial effusionseen.  Hemodynamic: Estimated right atrial pressure is 8 mm Hg.  ------------------------------------------------------------------------  Conclusions:  1. Dilated Aortic Root: 4 cm.  2. Left atrium not well visualized.  3. Endocardial visualization enhanced with intravenous  injection of Ultrasonic Enhancing Agent (Lumason). Moderate  segmental left ventricular systolic dysfunction. There is  septal, anterior, and lateral hypokinesis.  4. Unable to determine diastolic function due to poor  visualization of the left atrium.  5. Right atrium not well visualized.  6. The right ventricle is not well visualized.  7. Normal tricuspid valve. No tricuspid regurgitation. RVSP  was not calculated due to insufficient Doppler signal.  *** No previous Echo exam.    < end of copied text >

## 2022-09-02 NOTE — PROGRESS NOTE ADULT - ASSESSMENT
72 year old man with metastatic Gb cancer and malignant ascites, admitted with hypotension, hyponatremia and CHA.     1) malignant ascites    - s/p LVP w/ 4.9 liters removed 8/29  -s/p paracentesis by IR 9/2 with 5.9L removed  - to receive albumin s/p para  - per floor ACP, family has decided for home hospice  - if pt has recurrent ascites, would consider palliative peritoneal drain     2) metastatis GB cancer  - per oncology     3) PE, DVT  - unable to anticoagulate given thrombocytopenia  -s/p IVC filter today     4) CHA  - nephrology on board    5) Leukocytosis, improved   - per primary team     6) Bowel regime  -daily Miralax 17g ordered as pt is on narcotics       Attending supervision statement: I have personally seen and examined the patient. I fully participated in the care of this patient. I have made amendments to the documentation where necessary, and agree with the history, physical exam, and plan as outlined by the ACP.    [facility]  Gastroenterology and Hepatology  [address] [place], [place]  Office: [phone]     72 year old man with metastatic Gb cancer and malignant ascites, admitted with hypotension, hyponatremia and CHA.     1) malignant ascites    - s/p LVP w/ 4.9 liters removed 8/29  -s/p paracentesis by IR 9/2 with 5.9L removed  - to receive albumin s/p para  - per floor ACP, family has decided for home hospice  - if pt has recurrent ascites, would consider palliative peritoneal drain     2) metastatis GB cancer  - per oncology     3) PE, DVT  - unable to anticoagulate given thrombocytopenia  -s/p IVC filter today     4) CHA  - nephrology on board    5) Leukocytosis, improved   - per primary team     6) Bowel regime  -daily Miralax 17g ordered as pt is on narcotics       Attending supervision statement: I have personally seen and examined the patient. I fully participated in the care of this patient. I have made amendments to the documentation where necessary, and agree with the history, physical exam, and plan as outlined by the ACP.    [facility]  Gastroenterology and Hepatology  [address] [place], [place]  Office: [phone]

## 2022-09-02 NOTE — PROGRESS NOTE ADULT - SUBJECTIVE AND OBJECTIVE BOX
Patient is a 72y old  Male who presents with a chief complaint of Hyponatremia and CHA (02 Sep 2022 13:48)    Patient seen this morning. Wife at bedside. Lethargic after paracentesis. 5900 mL removed.    MEDICATIONS  (STANDING):  albumin human 25% IVPB 50 milliLiter(s) IV Intermittent every 6 hours  chlorhexidine 2% Cloths 1 Application(s) Topical daily  gabapentin 200 milliGRAM(s) Oral two times a day  midodrine. 20 milliGRAM(s) Oral three times a day  pantoprazole    Tablet 40 milliGRAM(s) Oral before breakfast  polyethylene glycol 3350 17 Gram(s) Oral daily  senna 2 Tablet(s) Oral at bedtime  triamcinolone 0.1% Cream 1 Application(s) Topical every 12 hours  vancomycin  IVPB 1000 milliGRAM(s) IV Intermittent every 12 hours    MEDICATIONS  (PRN):  HYDROmorphone  Injectable 1 milliGRAM(s) IV Push every 3 hours PRN Severe Pain (7 - 10)  oxyCODONE    IR 10 milliGRAM(s) Oral every 4 hours PRN Moderate Pain (4 - 6)  sodium chloride 0.65% Nasal 1 Spray(s) Both Nostrils four times a day PRN Nasal Congestion    Vital Signs Last 24 Hrs  T(C): 36.7 (02 Sep 2022 12:29), Max: 37.2 (01 Sep 2022 20:44)  T(F): 98.1 (02 Sep 2022 12:29), Max: 99 (01 Sep 2022 20:44)  HR: 108 (02 Sep 2022 12:29) (98 - 109)  BP: 101/73 (02 Sep 2022 12:29) (95/62 - 120/76)  BP(mean): --  RR: 17 (02 Sep 2022 12:29) (17 - 20)  SpO2: 96% (02 Sep 2022 12:29) (95% - 96%)    Parameters below as of 02 Sep 2022 12:29  Patient On (Oxygen Delivery Method): nasal cannula  O2 Flow (L/min): 2      PE  NAD  Thin  Awake, lethargic  Anicteric, MMM  Abd less distended  No rash grossly                            13.1   8.74  )-----------( 67       ( 02 Sep 2022 05:26 )             42.3       09-02    140  |  99  |  47<H>  ----------------------------<  120<H>  4.0   |  30  |  0.79    Ca    8.9      02 Sep 2022 05:26  Phos  2.3     09-02  Mg     2.2     09-02    TPro  5.6<L>  /  Alb  3.5  /  TBili  1.0  /  DBili  x   /  AST  100<H>  /  ALT  46<H>  /  AlkPhos  343<H>  09-02

## 2022-09-02 NOTE — DISCHARGE NOTE PROVIDER - DETAILS OF MALNUTRITION DIAGNOSIS/DIAGNOSES
This patient has been assessed with a concern for Malnutrition and was treated during this hospitalization for the following Nutrition diagnosis/diagnoses:     -  08/29/2022: Moderate protein-calorie malnutrition

## 2022-09-02 NOTE — PROGRESS NOTE ADULT - PROBLEM SELECTOR PROBLEM 2
CHA (acute kidney injury)
CHA (acute kidney injury)
Constipation
DVT, lower extremity
CHA (acute kidney injury)
CHA (acute kidney injury)
DVT, lower extremity
Pleural effusion
Constipation
DVT, lower extremity
DVT, lower extremity

## 2022-09-02 NOTE — PROGRESS NOTE ADULT - ASSESSMENT
Patient is a 72 year old male with PMHx of gallbladder cancer with metastasis to the peritoneum, right pleura, liver, hyponatremia, HLD who presents to [facility] from outside laboratory for hypotension, CHA and hyponatremia. Patient admits to abdominal distention, and was recently admitted for a thoracentesis at OSH. ID consulted for concern for abdominal cellulitis.     Abdominal cellulitis, possible abscess   - 3 indurated areas on abdomen -- chronic since surgery/drains per pt     -- 8/30 noted with blanching erythema -- erythema stable, areas nontender   - abd U/S with 4cm and 10cm fluid collections in R and L abd sites that track to skin; 3rd is 2cm septated collection at umbilicus area   - per IR, unable to aspiration collections     - continue to monitor clinically -- if any worsening would reconsult   - continue ceftriaxone (started 8/27--)   - continue vancomycin 1g IV Q12h - can plan for 7-10d course     -- pharmacist assisting with dosing, monitor trough, goal 10-15    -- noted possible plan for home hospice, can switch to doxycycline 100mg PO BID if in line with GOC    Malignancy ascites, s/p paracentesis 8/29  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 Bharat Boudreaux will be covering for our group from 9/3-9/5. If you have any questions, please reach out to them at  [phone].    Alex Medel M.D.  [facility], Division of Infectious Diseases  [phone]  After 5pm on weekdays and all day on weekends - please call [phone]   Patient is a 72 year old male with PMHx of gallbladder cancer with metastasis to the peritoneum, right pleura, liver, hyponatremia, HLD who presents to [facility] from outside laboratory for hypotension, CHA and hyponatremia. Patient admits to abdominal distention, and was recently admitted for a thoracentesis at OSH. ID consulted for concern for abdominal cellulitis.     Abdominal cellulitis, possible abscess   - 3 indurated areas on abdomen -- chronic since surgery/drains per pt     -- 8/30 noted with blanching erythema while on ctx -- erythema stable, areas nontender   - abd U/S with 4cm and 10cm fluid collections in R and L abd sites that track to skin; 3rd is 2cm septated collection at umbilicus area   - per IR, unable to aspiration collections     - continue to monitor clinically -- if any worsening would reconsult   - s/p ceftriaxone (8/26-9/1)   - continue vancomycin 1g IV Q12h - can plan for 7-10d course     -- pharmacist assisting with dosing, monitor trough, goal 10-15    -- noted possible plan for home hospice, can switch to doxycycline 100mg PO BID if in line with GOC    Malignancy ascites, s/p paracentesis 8/29  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 Bharat Boudreaux will be covering for our group from 9/3-9/5. If you have any questions, please reach out to them at  [phone].    Alex Medel M.D.  [facility], Division of Infectious Diseases  [phone]  After 5pm on weekdays and all day on weekends - please call [phone]

## 2022-09-02 NOTE — PROGRESS NOTE ADULT - PROBLEM SELECTOR PROBLEM 4
Shortness of breath
Shortness of breath
Gallbladder cancer
Shortness of breath
Shortness of breath
Gallbladder cancer
Gallbladder cancer

## 2022-09-02 NOTE — PROGRESS NOTE ADULT - SUBJECTIVE AND OBJECTIVE BOX
Chief Complaint:  Patient is a 72y old  Male who presents with a chief complaint of Hyponatremia and CHA (02 Sep 2022 08:38)      Date of service 22 @ 13:13      Interval Events:   Patient seen and examined.   s/p paracentesis this morning  Patient now very lethargic.   daughter at bedside.     Hospital Medications:  chlorhexidine 2% Cloths 1 Application(s) Topical daily  gabapentin 200 milliGRAM(s) Oral two times a day  HYDROmorphone  Injectable 1 milliGRAM(s) IV Push every 3 hours PRN  midodrine. 20 milliGRAM(s) Oral three times a day  oxyCODONE    IR 10 milliGRAM(s) Oral every 4 hours PRN  pantoprazole    Tablet 40 milliGRAM(s) Oral before breakfast  polyethylene glycol 3350 17 Gram(s) Oral daily PRN  senna 2 Tablet(s) Oral at bedtime  vancomycin  IVPB 1000 milliGRAM(s) IV Intermittent every 12 hours        Review of Systems:  General:  No wt loss, fevers, chills, night sweats, fatigue,   Eyes:  Good vision, no reported pain  ENT:  No sore throat, pain, runny nose, dysphagia  CV:  No pain, palpitations, hypo/hypertension  Resp:  No dyspnea, cough, tachypnea, wheezing  GI:  See HPI  :  No pain, bleeding, incontinence, nocturia  Muscle:  No pain, weakness  Neuro:  No weakness, tingling, memory problems  Psych:  No fatigue, insomnia, mood problems, depression  Endocrine:  No polyuria, polydipsia, cold/heat intolerance  Heme:  No petechiae, ecchymosis, easy bruisability  Integumentary:  No rash, edema    PHYSICAL EXAM:   Vital Signs:  Vital Signs Last 24 Hrs  T(C): 36.7 (02 Sep 2022 12:29), Max: 37.2 (01 Sep 2022 20:44)  T(F): 98.1 (02 Sep 2022 12:29), Max: 99 (01 Sep 2022 20:44)  HR: 108 (02 Sep 2022 12:29) (98 - 109)  BP: 101/73 (02 Sep 2022 12:29) (95/62 - 120/76)  BP(mean): --  RR: 17 (02 Sep 2022 12:29) (17 - 20)  SpO2: 96% (02 Sep 2022 12:29) (95% - 96%)    Parameters below as of 02 Sep 2022 12:29  Patient On (Oxygen Delivery Method): nasal cannula  O2 Flow (L/min): 2    Daily     Daily Weight in k.8 (02 Sep 2022 07:50)      PHYSICAL EXAM:     GENERAL:  Appears stated age, well-groomed, well-nourished, no distress  HEENT:  NC/AT,  conjunctivae anicteric, clear and pink,   NECK: supple, trachea midline  CHEST:  Full & symmetric excursion, no increased effort, breath sounds clear  HEART:  Regular rhythm, no JVD  ABDOMEN:  Soft, non-tender, non-distended, normoactive bowel sounds,  no masses , no hepatosplenomegaly  EXTREMITIES:  no cyanosis,clubbing or edema  SKIN:  No rash, erythema, or, ecchymoses, no jaundice  NEURO:  Alert, non-focal, no asterixis  PSYCH: Appropriate affect, oriented to place and time  RECTAL: Deferred      LABS Personally reviewed by me:                        13.1   8.74  )-----------( 67       ( 02 Sep 2022 05:26 )             42.3     Mean Cell Volume: 100.0 fl (22 @ 05:26)        140  |  99  |  47<H>  ----------------------------<  120<H>  4.0   |  30  |  0.79    Ca    8.9      02 Sep 2022 05:26  Phos  2.3       Mg     2.2         TPro  5.6<L>  /  Alb  3.5  /  TBili  1.0  /  DBili  x   /  AST  100<H>  /  ALT  46<H>  /  AlkPhos  343<H>      LIVER FUNCTIONS - ( 02 Sep 2022 05:26 )  Alb: 3.5 g/dL / Pro: 5.6 g/dL / ALK PHOS: 343 U/L / ALT: 46 U/L / AST: 100 U/L / GGT: x           PT/INR - ( 01 Sep 2022 06:56 )   PT: 14.0 sec;   INR: 1.20 ratio         PTT - ( 01 Sep 2022 06:56 )  PTT:31.1 sec                            13.1   8.74  )-----------( 67       ( 02 Sep 2022 05:26 )             42.3                         11.8   7.04  )-----------( 50       ( 01 Sep 2022 06:52 )             37.6                         12.2   7.93  )-----------( 41       ( 31 Aug 2022 06:02 )             37.6       Imaging personally reviewed by me:

## 2022-09-02 NOTE — DISCHARGE NOTE PROVIDER - NSDCCPTREATMENT_GEN_ALL_CORE_FT
PRINCIPAL PROCEDURE  Procedure: IVC filter placement  Findings and Treatment: - If pt becomes a candidate for anticoagulation, the filter can be removed.   - Pt needs to call IR to make an appt for removal consultaion.   - IR phone numer: [phone]

## 2022-09-02 NOTE — PROGRESS NOTE ADULT - PROBLEM SELECTOR PLAN 4
Pt is looking to have treatments to prolong his life and reversible causes be addressed  Pt and family looking to the potential for full or dose reduced chemo if offered
Likely in the setting of PE, pleural effusions, abdominal ascites  -Keep O>I as tolerated  -Minimal O2 requirements. Keep sats >90% with supplemental O2 PRN (Currently RA-2LNC). Wean O2 as tolerated  -VBG with no CO2 retention.
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 Valentin at [facility] [place] [facility]  -S/p chemo  -CT C/A/P with scattered lung nodules with concern for lymphangitic spread, scattered liver & pancreatic lesions, peritoneal carcinomatosis   -Heme/onc f/u.  -Prognosis guarded, palliative care f/u.
Likely in the setting of PE, pleural effusions, abdominal ascites  -Keep O>I as tolerated  -Minimal O2 requirements. Keep sats >90% with supplemental O2 PRN (Currently RA-2LNC). Wean O2 as tolerated  -VBG with no CO2 retention.
Stage IV  Follows with Dr. Valentin   Oncology recommending hospice services, pending further GOC conversation  Pt and family would like a multidisciplinary meeting including oncology, tentatively scheduled for tomorrow at 2PM
Likely in the setting of PE, pleural effusions, abdominal ascites  -Keep O>I as tolerated  -Keep sats >90% with supplemental O2 PRN (currently 2LNC). Pt not noted to be hypoxic on admission, wean O2 as tolerated  -VBG with no Co2 retention.
Likely in the setting of PE, pleural effusions, abdominal ascites  -Keep O>I as tolerated  -Keep sats >90% with supplemental O2 PRN (currently 1LNC). Pt not noted to be hypoxic on admission, wean O2 as tolerated  -VBG with no Co2 retention.

## 2022-09-02 NOTE — PROGRESS NOTE ADULT - PROBLEM SELECTOR PLAN 2
Unknown baseline, creatinine on presentation was 1.25 which has increased to 2. 37 today likely from compartment syndrome from expanding ascites.    Creatinine improved s/p paracentesis with concurrent albumin as listed above to prevent extravasation of fluid intravascularly and back into the abdomen. Fluid cell count negative for SBP. No need for dialysis at this time. C/w rodriguez for now.  C/w lokelma 10 mg daily for now.      If you have any questions, please feel free to contact me  Al Zhong  Nephrology Fellow  [phone]; Prefer [employer] TEAMS  (After 5pm or on weekends please page the on-call fellow).    Patient was discussed with Dr. Schmid who agrees with my A/P. Addendum to follow
LE duplex with acute occlusive DVT affecting the R common femoral, deep femoral, femoral and popliteal veins. Also with R below the knee DVT affecting the posterior tibial peroneal truck, posterior tibial, peroneal, and gastrocnemius veins  -Acute occlusive DVT affecting the L common femoral vein and femoral vein in proximal thigh  -Off AC in the setting of thrombocytopenia  -IR consult for IVC filter.
LE duplex with acute occlusive DVT affecting the R common femoral, deep femoral, femoral and popliteal veins. Also with R below the knee DVT affecting the posterior tibial peroneal truck, posterior tibial, peroneal, and gastrocnemius veins  -Acute occlusive DVT affecting the L common femoral vein and femoral vein in proximal thigh  -Off AC in the setting of thrombocytopenia  -S/p IVC filter in IR 9/1.
Unknown baseline, creatinine on presentation was 1.25 which has increased to 2. 33 today likely from compartment syndrome from expanding ascites.    Patient would benefit from paracentesis, with concurrent albumin as listed above to prevent extravasation of fluid intravascularly and back into the abdomen. No need for diaylsis at this time. Please insert rodriguez to document accurate I/O and to prevent urinary retention. Please also give X1 dose of lokelma 10 mg today for K of 5.3, will likely increase tomorrow if not treated today. Please obtain a full set of urine lytes today (sodium, chloride, potassium, osm)    If you have any questions, please feel free to contact me  Al Zhong  Nephrology Fellow  [phone]; Prefer [employer] TEAMS  (After 5pm or on weekends please page the on-call fellow).    Patient was discussed with Dr. Juarez who agrees with my A/P. Addendum to follow
Unknown baseline, creatinine on presentation was 1.25 which has increased to 2. 37 today likely from compartment syndrome from expanding ascites.    Patient would benefit from paracentesis, with concurrent albumin as listed above to prevent extravasation of fluid intravascularly and back into the abdomen. No need for diaylsis at this time. C/w rodriguez for now.  C/w  lokelma 10 mg daily for now. Urine lytes with Na of 8, still pointing towards poor renal perfusion which can worsen kidney function    If you have any questions, please feel free to contact me  Al Zhong  Nephrology Fellow  [phone]; Prefer [employer] TEAMS  (After 5pm or on weekends please page the on-call fellow).    Patient was discussed with Dr. Schmid who agrees with my A/P. Addendum to follow
Continue to monitor
Unknown baseline, creatinine on presentation was 1.25 which has increased to 2. 37 today likely from compartment syndrome from expanding ascites.    Creatinine improved s/p paracentesis with concurrent albumin as listed above to prevent extravasation of fluid intravascularly and back into the abdomen. Fluid cell count negative for SBP. No need for dialysis at this time. C/w rodriguez for now.      Nephrology will be signing off at this time. Please reach out if you have any questions or concerns. Thank you for allowing us the opportunity to participate in this patient's care.     If you have any questions, please feel free to contact me  Al Zhong  Nephrology Fellow  [phone]; Prefer [employer] TEAMS  (After 5pm or on weekends please page the on-call fellow).    Patient was discussed with Dr. Schmid who agrees with my A/P. Addendum to follow
CT chest with b/l pleural effusions R>L  -Pleural effusions may be in the setting of fluid overload vs malignancy  -S/p recent admission at [facility] 8/1-8/12 for SOB in the setting of L pleural effusion, s/p L thoracentesis - unclear if any studies, cytology, cultures of fluid were sent. Spoke with wife (Sukhjinder), who denies having any medical records with further info  [place] [facility] [place] with b/l pl effusions R>L, increased since July 2022  -Keep O>I as tolerated  -F/u TTE.
No BM since admission  If no BM by today afternoon, add MIralax dq  Monitor BM
LE duplex with acute occlusive DVT affecting the R common femoral, deep femoral, femoral and popliteal veins. Also with R below the knee DVT affecting the posterior tibial peroneal truck, posterior tibial, peroneal, and gastrocnemius veins  -Acute occlusive DVT affecting the L common femoral vein and femoral vein in proximal thigh  -Off AC in the setting of thrombocytopenia  -S/p IVC filter in IR 9/1.
LE duplex with acute occlusive DVT affecting the R common femoral, deep femoral, femoral and popliteal veins. Also with R below the knee DVT affecting the posterior tibial peroneal truck, posterior tibial, peroneal, and gastrocnemius veins  -Acute occlusive DVT affecting the L common femoral vein and femoral vein in proximal thigh  -Off AC in the setting of thrombocytopenia  -IR consult for IVC filter, plans for tomorrow 9/1.

## 2022-09-02 NOTE — PROGRESS NOTE ADULT - PROBLEM SELECTOR PROBLEM 1
Hyponatremia
Hyponatremia
Ascites
Hyponatremia
Hyponatremia
Pulmonary embolism
Pulmonary embolism
Pain due to neoplasm
Pain due to neoplasm
Pulmonary embolism

## 2022-09-03 NOTE — PROGRESS NOTE ADULT - SUBJECTIVE AND OBJECTIVE BOX
Date of service  9/3/22    chief complaint: SOB    extended hpi:  71 y/o male with PMH of 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 Found to have acute B/L femoral DVT and LLL PE. Patient is s/p paracentesis with 4.9L removed on 8/29. TTE noted with EF 44%, moderate segmental LV dysfunction, septal/anterior/lateral wall hypokinesis, RV not well visualized. Cardiology consulted for further evaluation.    pt seen and examined, no complaints, ROS - .     Review of Systems:   Constitutional: [ ] fevers, [ ] chills.   Skin: [ ] dry skin. [ ] rashes.  Psychiatric: [ ] depression, [ ] anxiety.   Gastrointestinal: [ ] BRBPR, [ ] melena.   Neurological: [ ] confusion. [ ] seizures. [ ] shuffling gait.   Ears,Nose,Mouth and Throat: [ ] ear pain [ ] sore throat.   Eyes: [ ] diplopia.   Respiratory: [ ] hemoptysis. [ ] shortness of breath  Cardiovascular: See HPI above  Hematologic/Lymphatic: [ ] anemia. [ ] painful nodes. [ ] prolonged bleeding.   Genitourinary: [ ] hematuria. [ ] flank pain.   Endocrine: [ ] significant change in weight. [ ] intolerance to heat and cold.     Review of systems [ ] otherwise negative, [x ] otherwise unable to obtain    FH: no family history of sudden cardiac death in first degree relatives    SH: [ ] tobacco, [ ] alcohol, [ ] drugs         albumin human 25% IVPB 50 milliLiter(s) IV Intermittent every 6 hours  chlorhexidine 2% Cloths 1 Application(s) Topical daily  gabapentin 200 milliGRAM(s) Oral two times a day  HYDROmorphone  Injectable 1 milliGRAM(s) IV Push every 3 hours PRN  midodrine. 20 milliGRAM(s) Oral three times a day  oxyCODONE    IR 10 milliGRAM(s) Oral every 4 hours PRN  pantoprazole    Tablet 40 milliGRAM(s) Oral before breakfast  polyethylene glycol 3350 17 Gram(s) Oral daily  senna 2 Tablet(s) Oral at bedtime  sodium chloride 0.65% Nasal 1 Spray(s) Both Nostrils four times a day PRN  triamcinolone 0.1% Cream 1 Application(s) Topical every 12 hours                            13.1   8.74  )-----------( 67       ( 02 Sep 2022 05:26 )             42.3       Hemoglobin: 13.1 g/dL (09-02 @ 05:26)  Hemoglobin: 11.8 g/dL (09-01 @ 06:52)  Hemoglobin: 12.2 g/dL (08-31 @ 06:02)  Hemoglobin: 12.6 g/dL (08-30 @ 07:04)      09-03    134<L>  |  93<L>  |  56<H>  ----------------------------<  145<H>  4.0   |  27  |  0.94    Ca    8.2<L>      03 Sep 2022 06:02  Phos  2.3     09-02  Mg     2.2     09-02    TPro  5.1<L>  /  Alb  3.1<L>  /  TBili  0.9  /  DBili  x   /  AST  67<H>  /  ALT  39  /  AlkPhos  280<H>  09-03    Creatinine Trend: 0.94<--, 0.79<--, 0.95<--, 1.02<--, 1.69<--, 2.37<--    COAGS:           T(C): 36.6 (09-03-22 @ 05:22), Max: 37.2 (09-02-22 @ 10:12)  HR: 94 (09-03-22 @ 05:22) (92 - 109)  BP: 107/71 (09-03-22 @ 05:22) (101/73 - 120/76)  RR: 18 (09-03-22 @ 05:22) (17 - 18)  SpO2: 98% (09-03-22 @ 05:22) (95% - 98%)  Wt(kg): --    I&O's Summary    02 Sep 2022 07:01  -  03 Sep 2022 07:00  --------------------------------------------------------  IN: 360 mL / OUT: 400 mL / NET: -40 mL      Gen: lethargic but arousable  HEENT:  (-)icterus (-)pallor  CV: N S1 S2 1/6 HEVER (+)2 Pulses B/l  Resp:  Clear to auscultation B/L, normal effort  GI: (+) BS Soft, NT, ND  Lymph:  (-)Edema, (-)obvious lymphadenopathy  Skin: Warm to touch, Normal turgor  Psych: Appropriate mood and affect      TELEMETRY: SR 80-90	      ECG: Sinus Tachycardia at 105 bpm, no acute ST-T changes	    RADIOLOGY:         CXR: < from: Xray Chest 1 View AP/PA (08.26.22 @ 14:43) >  IMPRESSION:  Right chest wall MediPort tip in the region of the right atrium.  Bibasilar patchy airspace disease likelyatelectasis. Small left effusion.    < end of copied text >    < from: CT Chest w/ IV Cont (08.26.22 @ 14:43) >  IMPRESSION:    CHEST:  *  Pulmonary embolism within left lower lobar and segmental pulmonary   arteries.  *  Moderate right and small left pleural effusions.  *  Scattered nonspecific subcentimeter pulmonary nodules. Irregular   interlobular septal thickening in the right lung, possibly representing   lymphangitic spread of disease.  *  Enlarged heterogeneous thyroid gland with a mixed density leftthyroid   lobe nodule measuring 2.4 cm.    ABDOMEN AND PELVIS:  *  Scattered indeterminate liver lesions, presumably the patient's known   metastatic disease.  *  Findings consistent with known peritoneal carcinomatosis. Moderate to   large volume ascites.  *  Ill-defined lesion along the pancreatic tail, possibly reflecting   metastatic disease, with a primary pancreatic neoplasm not excluded.  *  Ovoid thickening of the left ventral abdominal musculature measuring   2.3 x 3.3 x 3.7 cm, potentially representing an implant, or possibly an   intramuscular hematoma.  *  Linear metallic density in the gastric fundus, possibly an endoclip.   Correlate with patient history.      Findings of pulmonary embolism were discussed with Dr. Argueta  8/26/2022   3:49 PM by Dr. Staley with read back confirmation.    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 Found to have acute B/L femoral DVT and LLL PE. Patient is s/p paracentesis with 4.9L removed on 8/29. TTE noted with EF 44%, moderate segmental LV dysfunction, septal/anterior/lateral wall hypokinesis, RV not well visualized. Cardiology consulted for further evaluation.    - Elevated troponin now downtrending likely demand ischemia in setting of hypotension and PE as well as CHA - no active chest pain or acute ischemic EKG changes - do not suspect ACS  - Not a candidate for ischemic eval given thrombocytopenia - unable to be on APT/AC  - Recommend medical management of moderate LV dysfunction with beta blockers and ACE/ARB however unable to start due to hypotension requiring midodrine  - Diuretics on hold per renal. CHA/Hyponatremia resolving.  - No further inpatient cardiac w/u expected at this time  -now DNR, planned for Home Hospice, equipment being delivered

## 2022-09-03 NOTE — PROGRESS NOTE ADULT - ASSESSMENT
Patient is a 72 year old male with PMHx of gallbladder cancer with metastasis to the peritoneum, right pleura, liver, hyponatremia, HLD who presents to [facility] from outside laboratory for hypotension, CHA and hyponatremia. Patient admits to abdominal distention, and was recently admitted for a thoracentesis at OSH. ID consulted for concern for abdominal cellulitis.     Abdominal cellulitis, possible abscess   - 3 indurated areas on abdomen -- chronic since surgery/drains per pt     -- 8/30 noted with blanching erythema while on ctx -- erythema stable, areas nontender   - abd U/S with 4cm and 10cm fluid collections in R and L abd sites that track to skin; 3rd is 2cm septated collection at umbilicus area   - per IR, unable to aspiration collections     - continue to monitor clinically -- if any worsening would reconsult   - s/p ceftriaxone (8/26-9/1)   - continue vancomycin 1g IV Q12h - can plan for 7-10d course     -- pharmacist assisting with dosing, monitor trough, goal 10-15    -- noted possible plan for home hospice, can switch to doxycycline 100mg PO BID if in line with GOC    Malignancy ascites, s/p paracentesis 8/29  Stage IV gall bladder cancer with metastasis to peritoneum, right pleura, liver  Thrombocytopenia likely due to chemotherapy  Transaminitis in setting of above   - 4.9L fluid removed 8/29 -- fluid analysis reviewed, <250 neutrophils   - ascitic fluid culture NGTD    - d/c ceftriaxone - cultures neg and completed 7d course     Acute PE and DVT    - unable to AC d/t thrombocytopenia and malignancy   - vascular and cardiology following   - s/p IVC filter placement by IR 9/1/22    CHA   - Nephrology following, monitor Cr   - renally dose meds/abx    Thank you for consulting us and involving us in the management of this most interesting and challenging case.  We will follow along in the care of this patient. Please call us at [phone] or text me directly on my cell# at [phone] with any concerns.   This has been addressed     Script shredded     Patient has been notified of DR. LOPEZ's response

## 2022-09-03 NOTE — PROGRESS NOTE ADULT - ASSESSMENT
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 Tito Valentin of [facility] s/p Gemcitabine (dose reduced) /Durvalumab LD 8/19/2022 presenting with hypotension 70's/60's,  noted to be hyponatremic (Na 127) and CHA with volume depletion.    Of note patient was recently admitted to [facility] for SOB s/p left thoracentesis and was noted to be hyponatremic on that admission and was DC'd on salt tabs 1g BID, 1L fluid restriction and Lasix 40 mg for LE edema.    Stage IV Ca Gallbladder  --Under care by Dr. Tito Valentin of [facility]  --S/P Gemcitabine (reduced dose)/Durvalumab (LD 8/19/2022)  --He was offered [place] [facility] [place], pt wanted to give tx a try. At this time, he has multiple complications, would be a poor candidate for systemic tx if he does not improve. --Per discussion with Dr Valentin, hospice appropriate  --Patient and family have had GOC discussion - have agreed to home hospice. Equipment is being dekvered tomorrow (9/3) with hopeful discharge home on Sundary 9/4.      Ascites  --S/P paracentesis 8/29 - 4.9 L removed  --Improved discomfort but still present  --On Rocephin ppx with concern for SBP - cultures NGTD  --Pt on Rocephin   --S/P repeat paracentesis 9/2 - 5.9 L removed yesterday    Pulmonary Embolus  --Cannot anticoagulate at this time given thrombocytopenia  --LE Doppler shows acute LLE DVT  --S/P IVC filter on 9/1    Hyponatremia  --Recently treated at [facility]  --Had been on Na tabs and fluid restriction  --Na normalized  --Treatment per primary team, nephrology    CHA  --renal following, related to multiple issues, fluid imbalance  --Creatinine today normalized    Hypotension  --Most likely secondary to volume depletion as well  --Improved with IVF   --on midodrine  --defer to renal, GI, specialists and primary team    Thrombocytopenia  -- Has improved since admission but still low  --Most likely secondary to chemotherapy/liver disease  --monitor for now  --Please transfuse platelets if <10K or <50K with bleeding or prior to procedures  -- PTT low, not DIC    EF 44%  --Seen on recent TTE  --Cardiology called to evaluate    GOC  --Poor performance with progression of disease and increased tumor burden  --Dr. Saravia spoke with patient's wife and daughter about poor prognosis and they have agreed to DNR/DNI as well as hospice placement (home hospice preferred)  --Dr. Valentin notified and agrees that this is very reasonable.  --Pending set up for home hospice - equipment being delivered    We will continue to follow patient and coordinate with [facility]    Jun Holman MD  Hematology/Oncology  [facility]   [phone]

## 2022-09-03 NOTE — PROGRESS NOTE ADULT - ASSESSMENT
73yo M PMHx gallbladder cancer with metastasis to the peritoneum, right pleura, liver, hyponatremia who presents to [facility] from outside laboratory for hypotension, CHA and hyponatremia. Patient admits to abdominal distention, and was recently admitted for a thoracentesis. Also with abdominal wall cellulitis. Acute PE and DVT.    # Hyponatremia  # hypotension  # Volume overload evidenced by ascites/ effusion 2/2 GB ca w/ mets vs Systolic CHF    # CHA  Diet with fluid restriction to 1L   renal following  creat improving  cont midodrine borderline BP  sp paracentesis 8/29 4.9 L removed s/p repeat para 09/2--5.9  cont albumin PRNC  leukocytosis downtrending  monitor erythema on abd  strict I&O   daily weights   echo noted with TTE- moderate systolic dysfunction with segmental wma   cardiology consult appreciated --no acei- bb due to hypotension... diuretics?  Plan for home hospice     #Abdominal Wall Cellulitis w/ possible abscess   -abd ult noted with possible fluid collections    -no fever   -IR consult for possible aspiration   -c/w ceftriaxone and vancomcyin per ID     #acute pulmonary embolism & DVT  s/p IVC filter   Unable to AC in view of thrombocytopenia & malignancy  TTE- moderate systolic dysfunction with segmental wma   vascular cardiology fu    # Stage IV Ca Gallbladder diffuse mets  # Thrombocytopenia Likely due to Chemotherapy   # transaminitis  outpt following Dr. Tito Valentin of [facility] [place]  sp Gemcitabine / Durvalumab (LD 8/19/2022), heme/onc following  appreciate palliative care recs; plan is for home hospice   transfuse platelets if <10K or <50K with bleeding or prior to procedures --> Per IR, goal of Platelets is > 30K for paracentesis   statin on hold    Hold off on pharm DVT PPX for now    Wife Sukhjinder [phone] and daughter.  Discussed care with family at bedside.       DIspo: Plan is for home hospice.   DNR /DNI.        PCP Dr. Patricia Goddard & Dr Davies    Please contact with any questions or concerns [phone].

## 2022-09-03 NOTE — PROGRESS NOTE ADULT - SUBJECTIVE AND OBJECTIVE BOX
Patient is a 72y old  Male who presents with a chief complaint of Hyponatremia and CHA (03 Sep 2022 10:07)      SUBJECTIVE / OVERNIGHT EVENTS:  Patient seen and examined.   COmfortable , no complaints.       Vital Signs Last 24 Hrs  T(C): 36.6 (03 Sep 2022 05:22), Max: 36.8 (02 Sep 2022 19:05)  T(F): 97.9 (03 Sep 2022 05:22), Max: 98.2 (02 Sep 2022 19:05)  HR: 94 (03 Sep 2022 05:22) (92 - 108)  BP: 107/71 (03 Sep 2022 05:22) (101/73 - 108/75)  BP(mean): --  RR: 18 (03 Sep 2022 05:22) (17 - 18)  SpO2: 98% (03 Sep 2022 05:22) (96% - 98%)    Parameters below as of 03 Sep 2022 05:22  Patient On (Oxygen Delivery Method): nasal cannula  O2 Flow (L/min): 2    I&O's Summary    02 Sep 2022 07:01  -  03 Sep 2022 07:00  --------------------------------------------------------  IN: 360 mL / OUT: 400 mL / NET: -40 mL    03 Sep 2022 07:01  -  03 Sep 2022 11:30  --------------------------------------------------------  IN: 150 mL / OUT: 400 mL / NET: -250 mL        PE:  GENERAL: NAD, AAOx2  CHEST/LUNG: CTABL, No wheeze  HEART: Regular rate and rhythm; no murmur  ABDOMEN: Soft, 3 points of indurated lessions-- small erythema, no warmth, Bowel sounds present  : rodriguez  EXTREMITIES:  2+ Peripheral Pulses, +2 LE edema  NEURO: No focal deficits    LABS:                        13.1   8.74  )-----------( 67       ( 02 Sep 2022 05:26 )             42.3     09-03    134<L>  |  93<L>  |  56<H>  ----------------------------<  145<H>  4.0   |  27  |  0.94    Ca    8.2<L>      03 Sep 2022 06:02  Phos  2.3     09-02  Mg     2.2     09-02    TPro  5.1<L>  /  Alb  3.1<L>  /  TBili  0.9  /  DBili  x   /  AST  67<H>  /  ALT  39  /  AlkPhos  280<H>  09-03      CAPILLARY BLOOD GLUCOSE                RADIOLOGY & ADDITIONAL TESTS:    Imaging Personally Reviewed:  [x] YES  [ ] NO    Consultant(s) Notes Reviewed:  [x] YES  [ ] NO      MEDICATIONS  (STANDING):  albumin human 25% IVPB 50 milliLiter(s) IV Intermittent every 6 hours  chlorhexidine 2% Cloths 1 Application(s) Topical daily  gabapentin 200 milliGRAM(s) Oral two times a day  midodrine. 20 milliGRAM(s) Oral three times a day  pantoprazole    Tablet 40 milliGRAM(s) Oral before breakfast  polyethylene glycol 3350 17 Gram(s) Oral daily  senna 2 Tablet(s) Oral at bedtime  triamcinolone 0.1% Cream 1 Application(s) Topical every 12 hours  vancomycin  IVPB 1000 milliGRAM(s) IV Intermittent every 12 hours    MEDICATIONS  (PRN):  HYDROmorphone  Injectable 1 milliGRAM(s) IV Push every 3 hours PRN Severe Pain (7 - 10)  oxyCODONE    IR 10 milliGRAM(s) Oral every 4 hours PRN Moderate Pain (4 - 6)  sodium chloride 0.65% Nasal 1 Spray(s) Both Nostrils four times a day PRN Nasal Congestion      Care Discussed with Consultants/Other Providers [x] YES  [ ] NO    HEALTH ISSUES - PROBLEM Dx:  Hyponatremia    Suspected pulmonary embolism    Suspected deep vein thrombosis (DVT)    Pain due to neoplasm    Gallbladder cancer    Weakness    Palliative care encounter    Pleural effusion    Pulmonary embolism    Thrombocytopenia    Ascites    Shortness of breath    Constipation    CHA (acute kidney injury)    DVT, lower extremity

## 2022-09-03 NOTE — PROGRESS NOTE ADULT - SUBJECTIVE AND OBJECTIVE BOX
[facility] DIVISION of INFECTIOUS DISEASE  Bharat Boudreaux MD PhD, Meenu Cisneros MD, Val Mcguire MD, Alex Medel MD, Israel Sheikh MD  and providing coverage with Mat Lopez MD  Providing Infectious Disease Consultations at [facility], [facility], [facility], [facility]'s    Office# [phone] to schedule follow up appointments  Answering Service for urgent calls or New Consults [phone]  Cell# to text for urgent issues Bharat Boudreaux [phone]     infectious diseases progress note:    ZUHAIR NOONAN is a 72y y. o. Male patient    Overnight and events of the last 24hrs reviewed    Allergies    No Known Allergies    Intolerances        ANTIBIOTICS/RELEVANT:  antimicrobials  vancomycin  IVPB 1000 milliGRAM(s) IV Intermittent every 12 hours    immunologic:    OTHER:  chlorhexidine 2% Cloths 1 Application(s) Topical daily  gabapentin 200 milliGRAM(s) Oral two times a day  HYDROmorphone  Injectable 1 milliGRAM(s) IV Push every 3 hours PRN  midodrine. 20 milliGRAM(s) Oral three times a day  oxyCODONE    IR 10 milliGRAM(s) Oral every 4 hours PRN  pantoprazole    Tablet 40 milliGRAM(s) Oral before breakfast  polyethylene glycol 3350 17 Gram(s) Oral daily  senna 2 Tablet(s) Oral at bedtime  sodium chloride 0.65% Nasal 1 Spray(s) Both Nostrils four times a day PRN  triamcinolone 0.1% Cream 1 Application(s) Topical every 12 hours      Objective:  Vital Signs Last 24 Hrs  T(C): 36.4 (03 Sep 2022 14:12), Max: 36.8 (02 Sep 2022 19:05)  T(F): 97.5 (03 Sep 2022 14:12), Max: 98.2 (02 Sep 2022 19:05)  HR: 89 (03 Sep 2022 14:12) (89 - 105)  BP: 117/79 (03 Sep 2022 14:12) (103/71 - 117/79)  BP(mean): --  RR: 18 (03 Sep 2022 14:12) (17 - 18)  SpO2: 97% (03 Sep 2022 14:12) (96% - 98%)    Parameters below as of 03 Sep 2022 14:12  Patient On (Oxygen Delivery Method): nasal cannula  O2 Flow (L/min): 2      T(C): 36.4 (09-03-22 @ 14:12), Max: 37.2 (09-01-22 @ 20:44)  T(C): 36.4 (09-03-22 @ 14:12), Max: 37.2 (09-01-22 @ 20:44)  T(C): 36.4 (09-03-22 @ 14:12), Max: 37.2 (09-01-22 @ 20:44)    PHYSICAL EXAM:  HEENT: NC atraumatic  Neck: supple  Respiratory: no accessory muscle use, breathing comfortably  Cardiovascular: distant  Gastrointestinal: normal appearing, nondistended  Extremities: no clubbing, no cyanosis,  Skin: sig RLQ abd wall erythema        LABS:                          13.1   8.74  )-----------( 67       ( 02 Sep 2022 05:26 )             42.3       WBC  8.74 09-02 @ 05:26  7.04 09-01 @ 06:52  7.93 08-31 @ 06:02  10.27 08-30 @ 07:04  12.97 08-29 @ 07:43  13.19 08-28 @ 07:19      09-03    134<L>  |  93<L>  |  56<H>  ----------------------------<  145<H>  4.0   |  27  |  0.94    Ca    8.2<L>      03 Sep 2022 06:02  Phos  2.3     09-02  Mg     2.2     09-02    TPro  5.1<L>  /  Alb  3.1<L>  /  TBili  0.9  /  DBili  x   /  AST  67<H>  /  ALT  39  /  AlkPhos  280<H>  09-03      Creatinine, Serum: 0.94 mg/dL (09-03-22 @ 06:02)  Creatinine, Serum: 0.79 mg/dL (09-02-22 @ 05:26)  Creatinine, Serum: 0.95 mg/dL (09-01-22 @ 06:59)  Creatinine, Serum: 1.02 mg/dL (08-31-22 @ 06:02)  Creatinine, Serum: 1.69 mg/dL (08-30-22 @ 07:04)  Creatinine, Serum: 2.37 mg/dL (08-29-22 @ 07:46)  Creatinine, Serum: 2.33 mg/dL (08-28-22 @ 07:14)                INFLAMMATORY MARKERS  Auto Neutrophil #: 9.22 K/uL (08-26-22 @ 13:06)  Auto Lymphocyte #: 0.00 K/uL (08-26-22 @ 13:06)      Auto Eosinophil #: 0.00 K/uL (08-26-22 @ 13:06)                        INR: 1.20 ratio (09-01-22 @ 06:56)  Activated Partial Thromboplastin Time: 31.1 sec (09-01-22 @ 06:56)  INR: 1.31 ratio (08-27-22 @ 07:18)  Activated Partial Thromboplastin Time: 23.5 sec (08-27-22 @ 07:18)          MICROBIOLOGY:              RADIOLOGY & ADDITIONAL STUDIES:

## 2022-09-03 NOTE — PROGRESS NOTE ADULT - SUBJECTIVE AND OBJECTIVE BOX
Patient is a 72y old  Male who presents with a chief complaint of Hyponatremia and CHA (02 Sep 2022 16:09)      MEDICATIONS  (STANDING):  albumin human 25% IVPB 50 milliLiter(s) IV Intermittent every 6 hours  chlorhexidine 2% Cloths 1 Application(s) Topical daily  gabapentin 200 milliGRAM(s) Oral two times a day  midodrine. 20 milliGRAM(s) Oral three times a day  pantoprazole    Tablet 40 milliGRAM(s) Oral before breakfast  polyethylene glycol 3350 17 Gram(s) Oral daily  senna 2 Tablet(s) Oral at bedtime  triamcinolone 0.1% Cream 1 Application(s) Topical every 12 hours    MEDICATIONS  (PRN):  HYDROmorphone  Injectable 1 milliGRAM(s) IV Push every 3 hours PRN Severe Pain (7 - 10)  oxyCODONE    IR 10 milliGRAM(s) Oral every 4 hours PRN Moderate Pain (4 - 6)  sodium chloride 0.65% Nasal 1 Spray(s) Both Nostrils four times a day PRN Nasal Congestion      ROS  No fever, sweats, chills  No epistaxis, HA, sore throat  No CP, SOB, cough, sputum  No n/v/d, abd pain, melena, hematochezia  No edema  No rash  No anxiety  No back pain, joint pain  No bleeding, bruising  No dysuria, hematuria    Vital Signs Last 24 Hrs  T(C): 36.6 (03 Sep 2022 05:22), Max: 37.2 (02 Sep 2022 10:12)  T(F): 97.9 (03 Sep 2022 05:22), Max: 98.9 (02 Sep 2022 10:12)  HR: 94 (03 Sep 2022 05:22) (92 - 109)  BP: 107/71 (03 Sep 2022 05:22) (101/73 - 120/76)  BP(mean): --  RR: 18 (03 Sep 2022 05:22) (17 - 18)  SpO2: 98% (03 Sep 2022 05:22) (95% - 98%)    Parameters below as of 03 Sep 2022 05:22  Patient On (Oxygen Delivery Method): nasal cannula  O2 Flow (L/min): 2      PE  NAD  Awake, alert  Anicteric, MMM  RRR  CTAB  Abd soft, NT, ND  No c/c/e  No rash grossly  FROM                          13.1   8.74  )-----------( 67       ( 02 Sep 2022 05:26 )             42.3       09-03    134<L>  |  93<L>  |  56<H>  ----------------------------<  145<H>  4.0   |  27  |  0.94    Ca    8.2<L>      03 Sep 2022 06:02  Phos  2.3     09-02  Mg     2.2     09-02    TPro  5.1<L>  /  Alb  3.1<L>  /  TBili  0.9  /  DBili  x   /  AST  67<H>  /  ALT  39  /  AlkPhos  280<H>  09-03

## 2022-09-04 NOTE — PROGRESS NOTE ADULT - NS ATTEND AMEND GEN_ALL_CORE FT
no changes in CV plan. awaiting hospice discharge.
pt with debility end stage cancer. now with pe dvt. likely wont be candidate for further treatment. recommend goals of care discussion
agree with above. plan for ivc filter
71 y/o male with history of gallbladder cancer, admitted with hyponatremia, kidney dysfunction.    Plan on paracentesis tomorrow; recommend peritoneal catheter placement given goals of care.  Planning on hospice placement given clinical deterioration and worsening performance status.  IVC filter placed given thrombocytopenia for deep vein thrombosis and pulmonary embolism  Nephrology following for hyponatremia and kidney dysfunction.
BP support with midodrine.  No CHF GDMT (low BP, and outside of goals of care)  Awaiting home hospice.
no changes in mgmt plan today. pending DC with home hospice.
keep sat>90% w o2 as needed  start saline nasal spray
fu ivc filter as per heme

## 2022-09-04 NOTE — PROGRESS NOTE ADULT - SUBJECTIVE AND OBJECTIVE BOX
Date of service  9/4/22    chief complaint: SOB    extended hpi:  73 y/o male with PMH of 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 Found to have acute B/L femoral DVT and LLL PE. Patient is s/p paracentesis with 4.9L removed on 8/29. TTE noted with EF 44%, moderate segmental LV dysfunction, septal/anterior/lateral wall hypokinesis, RV not well visualized. Cardiology consulted for further evaluation.    pt seen and examined, ROS -     Review of Systems:   Constitutional: [ ] fevers, [ ] chills.   Skin: [ ] dry skin. [ ] rashes.  Psychiatric: [ ] depression, [ ] anxiety.   Gastrointestinal: [ ] BRBPR, [ ] melena.   Neurological: [ ] confusion. [ ] seizures. [ ] shuffling gait.   Ears,Nose,Mouth and Throat: [ ] ear pain [ ] sore throat.   Eyes: [ ] diplopia.   Respiratory: [ ] hemoptysis. [ ] shortness of breath  Cardiovascular: See HPI above  Hematologic/Lymphatic: [ ] anemia. [ ] painful nodes. [ ] prolonged bleeding.   Genitourinary: [ ] hematuria. [ ] flank pain.   Endocrine: [ ] significant change in weight. [ ] intolerance to heat and cold.     Review of systems [ ] otherwise negative, [x ] otherwise unable to obtain    FH: no family history of sudden cardiac death in first degree relatives    SH: [ ] tobacco, [ ] alcohol, [ ] drugs            chlorhexidine 2% Cloths 1 Application(s) Topical daily  gabapentin 200 milliGRAM(s) Oral two times a day  heparin  Lock Flush 100 Units/mL Injectable 300 Unit(s) IV Push once  HYDROmorphone  Injectable 1 milliGRAM(s) IV Push every 3 hours PRN  midodrine. 20 milliGRAM(s) Oral three times a day  pantoprazole    Tablet 40 milliGRAM(s) Oral before breakfast  polyethylene glycol 3350 17 Gram(s) Oral daily  senna 2 Tablet(s) Oral at bedtime  sodium chloride 0.65% Nasal 1 Spray(s) Both Nostrils four times a day PRN  triamcinolone 0.1% Cream 1 Application(s) Topical every 12 hours  vancomycin  IVPB 1000 milliGRAM(s) IV Intermittent every 12 hours          Hemoglobin: 13.1 g/dL (09-02 @ 05:26)  Hemoglobin: 11.8 g/dL (09-01 @ 06:52)  Hemoglobin: 12.2 g/dL (08-31 @ 06:02)      09-03    134<L>  |  93<L>  |  56<H>  ----------------------------<  145<H>  4.0   |  27  |  0.94    Ca    8.2<L>      03 Sep 2022 06:02    TPro  5.1<L>  /  Alb  3.1<L>  /  TBili  0.9  /  DBili  x   /  AST  67<H>  /  ALT  39  /  AlkPhos  280<H>  09-03    Creatinine Trend: 0.94<--, 0.79<--, 0.95<--, 1.02<--, 1.69<--, 2.37<--    COAGS:           T(C): 36.4 (09-03-22 @ 19:39), Max: 36.4 (09-03-22 @ 14:12)  HR: 83 (09-03-22 @ 19:39) (83 - 89)  BP: 103/69 (09-03-22 @ 19:39) (103/69 - 117/79)  RR: 18 (09-03-22 @ 19:39) (18 - 18)  SpO2: 99% (09-03-22 @ 19:39) (97% - 99%)  Wt(kg): --    I&O's Summary    03 Sep 2022 07:01  -  04 Sep 2022 07:00  --------------------------------------------------------  IN: 150 mL / OUT: 700 mL / NET: -550 mL      Gen: lethargic but arousable  HEENT:  (-)icterus (-)pallor  CV: N S1 S2 1/6 HEVER (+)2 Pulses B/l  Resp:  Clear to auscultation B/L, normal effort  GI: (+) BS Soft, NT, ND  Lymph:  (-)Edema, (-)obvious lymphadenopathy  Skin: Warm to touch, Normal turgor  Psych: Appropriate mood and affect      TELEMETRY: SR 80-90	      ECG: Sinus Tachycardia at 105 bpm, no acute ST-T changes	    RADIOLOGY:         CXR: < from: Xray Chest 1 View AP/PA (08.26.22 @ 14:43) >  IMPRESSION:  Right chest wall MediPort tip in the region of the right atrium.  Bibasilar patchy airspace disease likelyatelectasis. Small left effusion.    < end of copied text >    < from: CT Chest w/ IV Cont (08.26.22 @ 14:43) >  IMPRESSION:    CHEST:  *  Pulmonary embolism within left lower lobar and segmental pulmonary   arteries.  *  Moderate right and small left pleural effusions.  *  Scattered nonspecific subcentimeter pulmonary nodules. Irregular   interlobular septal thickening in the right lung, possibly representing   lymphangitic spread of disease.  *  Enlarged heterogeneous thyroid gland with a mixed density leftthyroid   lobe nodule measuring 2.4 cm.    ABDOMEN AND PELVIS:  *  Scattered indeterminate liver lesions, presumably the patient's known   metastatic disease.  *  Findings consistent with known peritoneal carcinomatosis. Moderate to   large volume ascites.  *  Ill-defined lesion along the pancreatic tail, possibly reflecting   metastatic disease, with a primary pancreatic neoplasm not excluded.  *  Ovoid thickening of the left ventral abdominal musculature measuring   2.3 x 3.3 x 3.7 cm, potentially representing an implant, or possibly an   intramuscular hematoma.  *  Linear metallic density in the gastric fundus, possibly an endoclip.   Correlate with patient history.      Findings of pulmonary embolism were discussed with Dr. Argueta  8/26/2022   3:49 PM by Dr. Staley with read back confirmation.    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 Found to have acute B/L femoral DVT and LLL PE. Patient is s/p paracentesis with 4.9L removed on 8/29. TTE noted with EF 44%, moderate segmental LV dysfunction, septal/anterior/lateral wall hypokinesis, RV not well visualized. Cardiology consulted for further evaluation.    - Elevated troponin now downtrending likely demand ischemia in setting of hypotension and PE as well as CHA - no active chest pain or acute ischemic EKG changes - do not suspect ACS  - Not a candidate for ischemic eval given thrombocytopenia - unable to be on APT/AC  - Recommend medical management of moderate LV dysfunction with beta blockers and ACE/ARB however unable to start due to hypotension requiring midodrine  - Diuretics on hold per renal. CHA/Hyponatremia resolving.  - No further inpatient cardiac w/u expected at this time  -now DNR, planned for Home Hospice, equipment being delivered

## 2022-09-04 NOTE — PROGRESS NOTE ADULT - REASON FOR ADMISSION
Hyponatremia and CHA
Hyponatremia and HCA
Hyponatremia and CHA
Hyponatremia and CAH
Hyponatremia and CHA

## 2022-09-04 NOTE — DISCHARGE NOTE NURSING/CASE MANAGEMENT/SOCIAL WORK - NSDCPEFALRISK_GEN_ALL_CORE
For information on Fall & Injury Prevention, visit: [email or web address] OR  [email or web address] OR  https://www.cdc.gov/steadi/patient.html

## 2022-09-04 NOTE — DISCHARGE NOTE NURSING/CASE MANAGEMENT/SOCIAL WORK - PATIENT PORTAL LINK FT
You can access the FollowMyHealth Patient Portal offered by [facility] by registering at the following website: [email or web address]. By joining [employer]’s FollowMyHealth portal, you will also be able to view your health information using other applications (apps) compatible with our system.

## 2022-09-04 NOTE — PROGRESS NOTE ADULT - ASSESSMENT
73yo M PMHx gallbladder cancer with metastasis to the peritoneum, right pleura, liver, hyponatremia who presents to [facility] from outside laboratory for hypotension, CHA and hyponatremia. Patient admits to abdominal distention, and was recently admitted for a thoracentesis. Also with abdominal wall cellulitis. Acute PE and DVT.    # Hyponatremia-- resolved   # hypotension-- resolved  # Volume overload evidenced by ascites/ effusion 2/2 GB ca w/ mets vs Systolic CHF    # CHA  Diet with fluid restriction to 1L   renal following  creat improving  cont midodrine borderline BP  sp paracentesis 8/29 4.9 L removed s/p repeat para 09/2--5.9 removed  monitor erythema on abd  strict I&O   daily weights   echo noted with TTE- moderate systolic dysfunction with segmental wma   cardiology consult appreciated --no acei- bb due to hypotension... diuretics?  Plan for home hospice     #Abdominal Wall Cellulitis w/ possible abscess   -abd ult noted with possible fluid collections    -no fever   -IR consult for possible aspiration   -c/w ceftriaxone and vancomcyin per ID     #acute pulmonary embolism & DVT  s/p IVC filter   Unable to AC in view of thrombocytopenia & malignancy  TTE- moderate systolic dysfunction with segmental wma   vascular cardiology fu    # Stage IV Ca Gallbladder diffuse mets  # Thrombocytopenia Likely due to Chemotherapy   # transaminitis  outpt following Dr. Tito Valentin of [facility]  sp Gemcitabine / Durvalumab (LD 8/19/2022), heme/onc following  appreciate palliative care recs; plan is for home hospice   transfuse platelets if <10K or <50K with bleeding or prior to procedures --> Per IR, goal of Platelets is > 30K for paracentesis   statin on hold    Hold off on pharm DVT PPX for now    Wife Sukhjinder [phone] and daughter.  Discussed care with family at bedside.       DIspo: Plan is for home hospice today.  DNR /DNI.        PCP Dr. Patricia Goddard & Dr Davies    Please contact with any questions or concerns [phone].

## 2022-09-04 NOTE — PROGRESS NOTE ADULT - PROVIDER SPECIALTY LIST ADULT
Cardiology
Heme/Onc
Infectious Disease
Internal Medicine
Internal Medicine
Pulmonology
Cardiology
Cardiology
Gastroenterology
Heme/Onc
Nephrology
Cardiology
Gastroenterology
Infectious Disease
Internal Medicine
Nephrology
Pulmonology
Gastroenterology
Heme/Onc
Heme/Onc
Internal Medicine
Internal Medicine
Intervent Radiology
Intervent Radiology
Gastroenterology
Internal Medicine
Nephrology
Nephrology
Internal Medicine
Pulmonology
Palliative Care
Pulmonology
Palliative Care
Pulmonology

## 2022-09-04 NOTE — PROGRESS NOTE ADULT - NS ATTEND OPT1 GEN_ALL_CORE
I attest my time as attending is greater than 50% of the total combined time spent on qualifying patient care activities by the PA/NP and attending.
I independently performed the documented:
I independently performed the documented:
I attest my time as attending is greater than 50% of the total combined time spent on qualifying patient care activities by the PA/NP and attending.
I independently performed the documented:

## 2022-09-04 NOTE — PROGRESS NOTE ADULT - NS ATTEND OPT1A GEN_ALL_CORE
History/Exam/Medical decision making
Medical decision making

## 2022-09-04 NOTE — PROGRESS NOTE ADULT - NUTRITIONAL ASSESSMENT
This patient has been assessed with a concern for Malnutrition and has been determined to have a diagnosis/diagnoses of Moderate protein-calorie malnutrition.    This patient is being managed with:   Diet NPO-  NPO for Procedure/Test     NPO Start Date: 31-Aug-2022   NPO Start Time: 23:59  Entered: Aug 30 2022  5:51PM    Diet Regular-  Easy to Chew (EASYTOCHEW)  1000mL Fluid Restriction ([id number])  Low Sodium  Supplement Feeding Modality:  Oral  Nepro Cans or Servings Per Day:  2       Frequency:  Daily  Entered: Aug 29 2022  2:08PM    Diet Regular-  1000mL Fluid Restriction ([id number])  Low Sodium  Supplement Feeding Modality:  Oral  Nepro Cans or Servings Per Day:  2       Frequency:  Daily  Entered: Aug 29 2022  2:02PM    Diet Regular-  1000mL Fluid Restriction ([id number])  Entered: Aug 26 2022  5:12PM    The following pending diet order is being considered for treatment of Moderate protein-calorie malnutrition:null
This patient has been assessed with a concern for Malnutrition and has been determined to have a diagnosis/diagnoses of Moderate protein-calorie malnutrition.    This patient is being managed with:   Diet NPO-  NPO for Procedure/Test     NPO Start Date: 31-Aug-2022   NPO Start Time: 23:59  Entered: Aug 30 2022  5:51PM    Diet Regular-  Easy to Chew (EASYTOCHEW)  1000mL Fluid Restriction ([id number])  Low Sodium  Supplement Feeding Modality:  Oral  Nepro Cans or Servings Per Day:  2       Frequency:  Daily  Entered: Aug 29 2022  2:08PM    Diet Regular-  1000mL Fluid Restriction ([id number])  Low Sodium  Supplement Feeding Modality:  Oral  Nepro Cans or Servings Per Day:  2       Frequency:  Daily  Entered: Aug 29 2022  2:02PM    Diet Regular-  1000mL Fluid Restriction ([id number])  Entered: Aug 26 2022  5:12PM    The following pending diet order is being considered for treatment of Moderate protein-calorie malnutrition:null
This patient has been assessed with a concern for Malnutrition and has been determined to have a diagnosis/diagnoses of Moderate protein-calorie malnutrition.    This patient is being managed with:   Diet Regular-  Easy to Chew (EASYTOCHEW)  1000mL Fluid Restriction ([id number])  Low Sodium  Supplement Feeding Modality:  Oral  Nepro Cans or Servings Per Day:  2       Frequency:  Daily  Entered: Aug 29 2022  2:08PM    Diet Regular-  1000mL Fluid Restriction ([id number])  Low Sodium  Supplement Feeding Modality:  Oral  Nepro Cans or Servings Per Day:  2       Frequency:  Daily  Entered: Aug 29 2022  2:02PM    Diet Regular-  1000mL Fluid Restriction ([id number])  Entered: Aug 26 2022  5:12PM    The following pending diet order is being considered for treatment of Moderate protein-calorie malnutrition:null
This patient has been assessed with a concern for Malnutrition and has been determined to have a diagnosis/diagnoses of Moderate protein-calorie malnutrition.    This patient is being managed with:   Diet Regular-  Easy to Chew (EASYTOCHEW)  1000mL Fluid Restriction ([id number])  Low Sodium  Supplement Feeding Modality:  Oral  Nepro Cans or Servings Per Day:  2       Frequency:  Daily  Entered: Aug 29 2022  2:08PM    Diet Regular-  1000mL Fluid Restriction ([id number])  Low Sodium  Supplement Feeding Modality:  Oral  Nepro Cans or Servings Per Day:  2       Frequency:  Daily  Entered: Aug 29 2022  2:02PM    Diet Regular-  1000mL Fluid Restriction ([id number])  Entered: Aug 26 2022  5:12PM    The following pending diet order is being considered for treatment of Moderate protein-calorie malnutrition:null
This patient has been assessed with a concern for Malnutrition and has been determined to have a diagnosis/diagnoses of Moderate protein-calorie malnutrition.    This patient is being managed with:   Diet Regular-  Easy to Chew (EASYTOCHEW)  1000mL Fluid Restriction ([id number])  Low Sodium  Supplement Feeding Modality:  Oral  Nepro Cans or Servings Per Day:  2       Frequency:  Daily  Entered: Aug 29 2022  2:08PM    Diet Regular-  1000mL Fluid Restriction ([id number])  Low Sodium  Supplement Feeding Modality:  Oral  Nepro Cans or Servings Per Day:  2       Frequency:  Daily  Entered: Aug 29 2022  2:02PM    Diet Regular-  1000mL Fluid Restriction ([id number])  Entered: Aug 26 2022  5:12PM    The following pending diet order is being considered for treatment of Moderate protein-calorie malnutrition:null
This patient has been assessed with a concern for Malnutrition and has been determined to have a diagnosis/diagnoses of Moderate protein-calorie malnutrition.    This patient is being managed with:   Diet NPO-  NPO for Procedure/Test     NPO Start Date: 31-Aug-2022   NPO Start Time: 23:59  Entered: Aug 30 2022  5:51PM    Diet Regular-  Easy to Chew (EASYTOCHEW)  1000mL Fluid Restriction ([id number])  Low Sodium  Supplement Feeding Modality:  Oral  Nepro Cans or Servings Per Day:  2       Frequency:  Daily  Entered: Aug 29 2022  2:08PM    Diet Regular-  1000mL Fluid Restriction ([id number])  Low Sodium  Supplement Feeding Modality:  Oral  Nepro Cans or Servings Per Day:  2       Frequency:  Daily  Entered: Aug 29 2022  2:02PM    Diet Regular-  1000mL Fluid Restriction ([id number])  Entered: Aug 26 2022  5:12PM    The following pending diet order is being considered for treatment of Moderate protein-calorie malnutrition:null
This patient has been assessed with a concern for Malnutrition and has been determined to have a diagnosis/diagnoses of Moderate protein-calorie malnutrition.    This patient is being managed with:   Diet Regular-  Easy to Chew (EASYTOCHEW)  1000mL Fluid Restriction ([id number])  Low Sodium  Supplement Feeding Modality:  Oral  Nepro Cans or Servings Per Day:  2       Frequency:  Daily  Entered: Aug 29 2022  2:08PM    Diet Regular-  1000mL Fluid Restriction ([id number])  Low Sodium  Supplement Feeding Modality:  Oral  Nepro Cans or Servings Per Day:  2       Frequency:  Daily  Entered: Aug 29 2022  2:02PM    Diet Regular-  1000mL Fluid Restriction ([id number])  Entered: Aug 26 2022  5:12PM    The following pending diet order is being considered for treatment of Moderate protein-calorie malnutrition:null
This patient has been assessed with a concern for Malnutrition and has been determined to have a diagnosis/diagnoses of Moderate protein-calorie malnutrition.    This patient is being managed with:   Diet NPO-  NPO for Procedure/Test     NPO Start Date: 31-Aug-2022   NPO Start Time: 23:59  Entered: Aug 30 2022  5:51PM    Diet Regular-  Easy to Chew (EASYTOCHEW)  1000mL Fluid Restriction ([id number])  Low Sodium  Supplement Feeding Modality:  Oral  Nepro Cans or Servings Per Day:  2       Frequency:  Daily  Entered: Aug 29 2022  2:08PM    Diet Regular-  1000mL Fluid Restriction ([id number])  Low Sodium  Supplement Feeding Modality:  Oral  Nepro Cans or Servings Per Day:  2       Frequency:  Daily  Entered: Aug 29 2022  2:02PM    Diet Regular-  1000mL Fluid Restriction ([id number])  Entered: Aug 26 2022  5:12PM    The following pending diet order is being considered for treatment of Moderate protein-calorie malnutrition:null
This patient has been assessed with a concern for Malnutrition and has been determined to have a diagnosis/diagnoses of Moderate protein-calorie malnutrition.    This patient is being managed with:   Diet Regular-  Easy to Chew (EASYTOCHEW)  1000mL Fluid Restriction ([id number])  Low Sodium  Supplement Feeding Modality:  Oral  Nepro Cans or Servings Per Day:  2       Frequency:  Daily  Entered: Aug 29 2022  2:08PM    Diet Regular-  1000mL Fluid Restriction ([id number])  Low Sodium  Supplement Feeding Modality:  Oral  Nepro Cans or Servings Per Day:  2       Frequency:  Daily  Entered: Aug 29 2022  2:02PM    Diet Regular-  1000mL Fluid Restriction ([id number])  Entered: Aug 26 2022  5:12PM    The following pending diet order is being considered for treatment of Moderate protein-calorie malnutrition:null
This patient has been assessed with a concern for Malnutrition and has been determined to have a diagnosis/diagnoses of Moderate protein-calorie malnutrition.    This patient is being managed with:   Diet Regular-  Easy to Chew (EASYTOCHEW)  1000mL Fluid Restriction ([id number])  Low Sodium  Supplement Feeding Modality:  Oral  Nepro Cans or Servings Per Day:  2       Frequency:  Daily  Entered: Aug 29 2022  2:08PM    Diet Regular-  1000mL Fluid Restriction ([id number])  Low Sodium  Supplement Feeding Modality:  Oral  Nepro Cans or Servings Per Day:  2       Frequency:  Daily  Entered: Aug 29 2022  2:02PM    Diet Regular-  1000mL Fluid Restriction ([id number])  Entered: Aug 26 2022  5:12PM    The following pending diet order is being considered for treatment of Moderate protein-calorie malnutrition:null
This patient has been assessed with a concern for Malnutrition and has been determined to have a diagnosis/diagnoses of Moderate protein-calorie malnutrition.    This patient is being managed with:   Diet NPO-  NPO for Procedure/Test     NPO Start Date: 31-Aug-2022   NPO Start Time: 23:59  Entered: Aug 30 2022  5:51PM    Diet Regular-  Easy to Chew (EASYTOCHEW)  1000mL Fluid Restriction ([id number])  Low Sodium  Supplement Feeding Modality:  Oral  Nepro Cans or Servings Per Day:  2       Frequency:  Daily  Entered: Aug 29 2022  2:08PM    Diet Regular-  1000mL Fluid Restriction ([id number])  Low Sodium  Supplement Feeding Modality:  Oral  Nepro Cans or Servings Per Day:  2       Frequency:  Daily  Entered: Aug 29 2022  2:02PM    Diet Regular-  1000mL Fluid Restriction ([id number])  Entered: Aug 26 2022  5:12PM    The following pending diet order is being considered for treatment of Moderate protein-calorie malnutrition:null

## 2022-09-04 NOTE — PROGRESS NOTE ADULT - SUBJECTIVE AND OBJECTIVE BOX
Patient is a 72y old  Male who presents with a chief complaint of Hyponatremia and CHA (04 Sep 2022 09:58)      SUBJECTIVE / OVERNIGHT EVENTS:  Patient seen and examined.   Comfortable.   Going to home today.       Vital Signs Last 24 Hrs  T(C): 36.4 (03 Sep 2022 19:39), Max: 36.4 (03 Sep 2022 14:12)  T(F): 97.6 (03 Sep 2022 19:39), Max: 97.6 (03 Sep 2022 19:39)  HR: 83 (03 Sep 2022 19:39) (83 - 89)  BP: 103/69 (03 Sep 2022 19:39) (103/69 - 117/79)  BP(mean): --  RR: 18 (03 Sep 2022 19:39) (18 - 18)  SpO2: 99% (03 Sep 2022 19:39) (97% - 99%)    Parameters below as of 03 Sep 2022 19:39  Patient On (Oxygen Delivery Method): nasal cannula  O2 Flow (L/min): 2    I&O's Summary    03 Sep 2022 07:01  -  04 Sep 2022 07:00  --------------------------------------------------------  IN: 150 mL / OUT: 700 mL / NET: -550 mL        PE:  GENERAL: NAD, AAOx2  CHEST/LUNG: CTABL, No wheeze  HEART: Regular rate and rhythm; no murmur  ABDOMEN: Soft, 3 points of indurated lessions-- small erythema, no warmth, Bowel sounds present  : rodriguez  EXTREMITIES:  2+ Peripheral Pulses, +2 LE edema  NEURO: No focal deficits    LABS:    09-03    134<L>  |  93<L>  |  56<H>  ----------------------------<  145<H>  4.0   |  27  |  0.94    Ca    8.2<L>      03 Sep 2022 06:02    TPro  5.1<L>  /  Alb  3.1<L>  /  TBili  0.9  /  DBili  x   /  AST  67<H>  /  ALT  39  /  AlkPhos  280<H>  09-03      CAPILLARY BLOOD GLUCOSE                RADIOLOGY & ADDITIONAL TESTS:    Imaging Personally Reviewed:  [x] YES  [ ] NO    Consultant(s) Notes Reviewed:  [x] YES  [ ] NO      MEDICATIONS  (STANDING):  chlorhexidine 2% Cloths 1 Application(s) Topical daily  gabapentin 200 milliGRAM(s) Oral two times a day  heparin  Lock Flush 100 Units/mL Injectable 300 Unit(s) IV Push once  midodrine. 20 milliGRAM(s) Oral three times a day  pantoprazole    Tablet 40 milliGRAM(s) Oral before breakfast  polyethylene glycol 3350 17 Gram(s) Oral daily  senna 2 Tablet(s) Oral at bedtime  triamcinolone 0.1% Cream 1 Application(s) Topical every 12 hours  vancomycin  IVPB 1000 milliGRAM(s) IV Intermittent every 12 hours    MEDICATIONS  (PRN):  HYDROmorphone  Injectable 1 milliGRAM(s) IV Push every 3 hours PRN Severe Pain (7 - 10)  sodium chloride 0.65% Nasal 1 Spray(s) Both Nostrils four times a day PRN Nasal Congestion      Care Discussed with Consultants/Other Providers [x] YES  [ ] NO    HEALTH ISSUES - PROBLEM Dx:  Hyponatremia    Suspected pulmonary embolism    Suspected deep vein thrombosis (DVT)    Pain due to neoplasm    Gallbladder cancer    Weakness    Palliative care encounter    Pleural effusion    Pulmonary embolism    Thrombocytopenia    Ascites    Shortness of breath    Constipation    CHA (acute kidney injury)    DVT, lower extremity

## 2022-10-10 ENCOUNTER — NON-APPOINTMENT (OUTPATIENT)
Age: 72
End: 2022-10-10

## 2022-11-08 ENCOUNTER — NON-APPOINTMENT (OUTPATIENT)
Age: 72
End: 2022-11-08

## 2022-12-12 ENCOUNTER — NON-APPOINTMENT (OUTPATIENT)
Age: 72
End: 2022-12-12

## 2023-11-24 NOTE — DIETITIAN INITIAL EVALUATION ADULT - NSFNSPHYEXAMSKINFT_GEN_A_CORE
In an effort to ensure that our patients LiveWell, a Team Member has reviewed your chart and identified an opportunity to provide the best care possible. An attempt was made to discuss or schedule overdue Preventive or Disease Management screening.     The Outcome was Contact was made, care gap was discussed - see further documentation. Care Gaps include Colorectal Cancer Screening. Pt would like orders placed for colonoscopy     
Pressure Injury 1: sacrum, Stage II  Pt states UBW ~260 pounds  142% IBW ( pounds)  Skin: stage 2 pressure injury sacrum   Performed nutrition focused physical exam with pt's consent and noted mild signs of muscle/fat loss

## 2024-04-19 NOTE — PROGRESS NOTE ADULT - PROBLEM SELECTOR PLAN 1
Spoke with Joe and informed him he needs to schedule an appointment within next 30 days. He was in agreement and will schedule soon.    Jessica    Left lower lobar and segmental PE - incidentally found on CT C/A/P with IV contrast  -Likely provoked in the setting of malignancy   -Troponins, proBNP elevated  -Off full dose AC in the setting of thrombocytopenia. CT also with concern for L intramuscular hematoma  -LE duplex +DVT  -TTE with limited visualization of RV  -Vascular cardiology f/u  -Heme/onc f/u